# Patient Record
Sex: FEMALE | Race: WHITE | Employment: OTHER | URBAN - METROPOLITAN AREA
[De-identification: names, ages, dates, MRNs, and addresses within clinical notes are randomized per-mention and may not be internally consistent; named-entity substitution may affect disease eponyms.]

---

## 2019-06-11 ENCOUNTER — OFFICE VISIT (OUTPATIENT)
Dept: FAMILY MEDICINE | Facility: CLINIC | Age: 84
End: 2019-06-11
Payer: MEDICARE

## 2019-06-11 VITALS
HEART RATE: 66 BPM | SYSTOLIC BLOOD PRESSURE: 112 MMHG | BODY MASS INDEX: 23.6 KG/M2 | OXYGEN SATURATION: 95 % | DIASTOLIC BLOOD PRESSURE: 58 MMHG | WEIGHT: 125 LBS | RESPIRATION RATE: 16 BRPM | TEMPERATURE: 97.7 F | HEIGHT: 61 IN

## 2019-06-11 DIAGNOSIS — I10 ESSENTIAL HYPERTENSION, BENIGN: Primary | ICD-10-CM

## 2019-06-11 DIAGNOSIS — F43.22 ADJUSTMENT DISORDER WITH ANXIOUS MOOD: ICD-10-CM

## 2019-06-11 DIAGNOSIS — H91.93 BILATERAL HEARING LOSS, UNSPECIFIED HEARING LOSS TYPE: ICD-10-CM

## 2019-06-11 DIAGNOSIS — M19.90 OSTEOARTHRITIS, UNSPECIFIED OSTEOARTHRITIS TYPE, UNSPECIFIED SITE: ICD-10-CM

## 2019-06-11 DIAGNOSIS — M81.0 AGE RELATED OSTEOPOROSIS, UNSPECIFIED PATHOLOGICAL FRACTURE PRESENCE: ICD-10-CM

## 2019-06-11 DIAGNOSIS — Z86.79 HISTORY OF ENDOCARDITIS: ICD-10-CM

## 2019-06-11 DIAGNOSIS — Z86.73 HISTORY OF CVA (CEREBROVASCULAR ACCIDENT): ICD-10-CM

## 2019-06-11 LAB
ERYTHROCYTE [DISTWIDTH] IN BLOOD BY AUTOMATED COUNT: 12.1 % (ref 10–15)
HCT VFR BLD AUTO: 39.8 % (ref 35–47)
HGB BLD-MCNC: 13 G/DL (ref 11.7–15.7)
MCH RBC QN AUTO: 32.2 PG (ref 26.5–33)
MCHC RBC AUTO-ENTMCNC: 32.7 G/DL (ref 31.5–36.5)
MCV RBC AUTO: 99 FL (ref 78–100)
PLATELET # BLD AUTO: 142 10E9/L (ref 150–450)
RBC # BLD AUTO: 4.04 10E12/L (ref 3.8–5.2)
WBC # BLD AUTO: 5.4 10E9/L (ref 4–11)

## 2019-06-11 PROCEDURE — 99204 OFFICE O/P NEW MOD 45 MIN: CPT | Performed by: FAMILY MEDICINE

## 2019-06-11 PROCEDURE — 36415 COLL VENOUS BLD VENIPUNCTURE: CPT | Performed by: FAMILY MEDICINE

## 2019-06-11 PROCEDURE — 80048 BASIC METABOLIC PNL TOTAL CA: CPT | Performed by: FAMILY MEDICINE

## 2019-06-11 PROCEDURE — 85027 COMPLETE CBC AUTOMATED: CPT | Performed by: FAMILY MEDICINE

## 2019-06-11 RX ORDER — POTASSIUM CHLORIDE 1500 MG/1
TABLET, EXTENDED RELEASE ORAL
COMMUNITY
Start: 2019-02-06 | End: 2019-06-11

## 2019-06-11 RX ORDER — LATANOPROST 50 UG/ML
1 SOLUTION/ DROPS OPHTHALMIC AT BEDTIME
COMMUNITY

## 2019-06-11 RX ORDER — SERTRALINE HYDROCHLORIDE 25 MG/1
25 TABLET, FILM COATED ORAL DAILY
COMMUNITY
Start: 2019-06-11 | End: 2020-01-14

## 2019-06-11 RX ORDER — ACETAMINOPHEN 500 MG
500-1000 TABLET ORAL EVERY 6 HOURS PRN
Status: ON HOLD | COMMUNITY
Start: 2019-06-11 | End: 2024-04-12

## 2019-06-11 RX ORDER — ASPIRIN 325 MG
325 TABLET ORAL DAILY
COMMUNITY

## 2019-06-11 RX ORDER — POTASSIUM CHLORIDE 1500 MG/1
20 TABLET, EXTENDED RELEASE ORAL DAILY
Qty: 90 TABLET | Refills: 1 | Status: SHIPPED | OUTPATIENT
Start: 2019-06-11 | End: 2019-11-13

## 2019-06-11 RX ORDER — FUROSEMIDE 40 MG
40 TABLET ORAL DAILY
COMMUNITY
Start: 2018-07-24 | End: 2019-06-11

## 2019-06-11 RX ORDER — LISINOPRIL 40 MG/1
40 TABLET ORAL DAILY
Qty: 90 TABLET | Refills: 1 | Status: SHIPPED | OUTPATIENT
Start: 2019-06-11 | End: 2019-10-21

## 2019-06-11 RX ORDER — FUROSEMIDE 40 MG
40 TABLET ORAL DAILY
Qty: 90 TABLET | Refills: 1 | Status: SHIPPED | OUTPATIENT
Start: 2019-06-11 | End: 2019-10-21

## 2019-06-11 RX ORDER — METOPROLOL TARTRATE 100 MG
100 TABLET ORAL 2 TIMES DAILY
COMMUNITY
Start: 2019-04-04 | End: 2019-06-11

## 2019-06-11 RX ORDER — LISINOPRIL 40 MG/1
40 TABLET ORAL
COMMUNITY
Start: 2018-10-14 | End: 2019-06-11

## 2019-06-11 RX ORDER — METOPROLOL TARTRATE 100 MG
100 TABLET ORAL 2 TIMES DAILY
Qty: 180 TABLET | Refills: 1 | Status: SHIPPED | OUTPATIENT
Start: 2019-06-11 | End: 2019-11-07

## 2019-06-11 ASSESSMENT — MIFFLIN-ST. JEOR: SCORE: 908.34

## 2019-06-11 NOTE — PROGRESS NOTES
Subjective     Dayana Samano is a 89 year old female who presents to clinic today for the following health issues:    HPI   Here to establish care.  Here with family member. Reviewed some records in Care Everywhere. Reviewed histories with them.    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     History of endocarditis      Osteoporosis, unspecified      Personal history of colonic polyps     last colonoscopy 2000     Past Surgical History:   Procedure Laterality Date     FEMUR SURGERY Right 05/16/2017    nail; right intertrochanteric fracture       Current Outpatient Medications   Medication Sig Dispense Refill     acetaminophen (TYLENOL) 500 MG tablet Take 1-2 tablets (500-1,000 mg) by mouth every 6 hours as needed for mild pain       aspirin (ASA) 325 MG tablet Take 325 mg by mouth daily       CALCIUM 600 + D 600-200 MG-UNIT OR TABS  3 MONTHS 1 YEAR     furosemide (LASIX) 40 MG tablet Take 1 tablet (40 mg) by mouth daily 90 tablet 1     hypromellose (ARTIFICIAL TEARS) 0.5 % SOLN ophthalmic solution 1 drop every hour as needed for dry eyes       latanoprost (XALATAN) 0.005 % ophthalmic solution Place 1 drop into both eyes daily       lisinopril (PRINIVIL/ZESTRIL) 40 MG tablet Take 1 tablet (40 mg) by mouth daily 90 tablet 1     metoprolol tartrate (LOPRESSOR) 100 MG tablet Take 1 tablet (100 mg) by mouth 2 times daily 180 tablet 1     potassium chloride ER (KLOR-CON) 20 MEQ CR tablet Take 1 tablet (20 mEq) by mouth daily 90 tablet 1     sertraline (ZOLOFT) 25 MG tablet Take 1 tablet (25 mg) by mouth daily         Reviewed and updated as needed this visit by Provider         Review of Systems   ROS  "COMP: CONSTITUTIONAL:NEGATIVE for fever, chills, change in weight  RESP:NEGATIVE for significant cough or SOB  CV: NEGATIVE for chest pain, palpitations or peripheral edema  MUSCULOSKELETAL: see below  PSYCHIATRIC: NEGATIVE for changes in mood or affect    AL in [place].  Lives mostly independently. Will get one meal per day while there. Help with bath one day per week.    Will get pain in left elbow at times.    Has had hospitalizations related to fractures; elbow, pelvis.  Has been off actonel for some time.   Age 89 is when things started.   Fractured hip May 2017  Taking 1200 mg calcium daily.    Itchy spots.   Did see Dermatologist. May have some inside mouth.     Does have heart murmur; history of endocadtitis.   This was complicated with stroke. Affected right hand; stll with residual.     Bilateral aids.    Was put on sertraline when selling house and making decisions.   Couple years ago.  Now she is stable where she is; not sure if needs it. Has not been on medications previously.            Objective    /58 (BP Location: Right arm, Cuff Size: Adult Regular)   Pulse 66   Temp 97.7  F (36.5  C) (Oral)   Resp 16   Ht 1.556 m (5' 1.25\")   Wt 56.7 kg (125 lb)   SpO2 95%   BMI 23.43 kg/m    Body mass index is 23.43 kg/m .  Physical Exam   GENERAL APPEARANCE: alert and no distress  RESP: lungs clear to auscultation - no rales, rhonchi or wheezes  CV: regular rates and rhythm  MS: no edema.   PSYCH: mentation appears normal and affect normal/bright          GFR 7/24/18 50 from Care Everywhere        Assessment & Plan     1. Essential hypertension, benign  Stable. Refilling.   - Basic metabolic panel  - CBC with platelets  - metoprolol tartrate (LOPRESSOR) 100 MG tablet; Take 1 tablet (100 mg) by mouth 2 times daily  Dispense: 180 tablet; Refill: 1  - lisinopril (PRINIVIL/ZESTRIL) 40 MG tablet; Take 1 tablet (40 mg) by mouth daily  Dispense: 90 tablet; Refill: 1    2. Age related osteoporosis, " unspecified pathological fracture presence  Not currently on treatment. Discussed some of this briefly. She is taking calcium. She is at risk for future fracture.  May wish to consider medication; I believe she has been on some in the past. (alendronate; did see in her [facility] records that she declined additional treatment in 2016)    3. Osteoarthritis, unspecified osteoarthritis type, unspecified site  Discussed acetaminophen dosing. Discussed this as being the safest oral medication in my opinion. Can try some topicals as well.   - acetaminophen (TYLENOL) 500 MG tablet; Take 1-2 tablets (500-1,000 mg) by mouth every 6 hours as needed for mild pain    4. History of endocarditis  Getting prescriptions in my name.  - furosemide (LASIX) 40 MG tablet; Take 1 tablet (40 mg) by mouth daily  Dispense: 90 tablet; Refill: 1  - metoprolol tartrate (LOPRESSOR) 100 MG tablet; Take 1 tablet (100 mg) by mouth 2 times daily  Dispense: 180 tablet; Refill: 1  - lisinopril (PRINIVIL/ZESTRIL) 40 MG tablet; Take 1 tablet (40 mg) by mouth daily  Dispense: 90 tablet; Refill: 1  - potassium chloride ER (KLOR-CON) 20 MEQ CR tablet; Take 1 tablet (20 mEq) by mouth daily  Dispense: 90 tablet; Refill: 1    5. History of CVA (cerebrovascular accident)  With some right hand residual. Stable overall.  Thought due to emboli related to her endocarditis.     6. Adjustment disorder with anxious mood  Was put on sertraline when selling house etc. At this time, will do trial of going off. Also see patient instructions.    7. Bilateral hearing loss, unspecified hearing loss type  Wears hearing aids.              Patient Instructions       I would recommend to try going off the sertraline.    Go with 1/2 pill for a couple weeks. If doing well, you can try to stop it.          No follow-ups on file.     I spent 45 minutes face to face, of which at least 50 % was spent in counseling or coordination of care for reviewing history and medications.  .    N4  Marion Alonso MD, MD  [facility]

## 2019-06-11 NOTE — LETTER
June 19, 2019      Dayana JAVIER Samano  [address] [place] [id number]        Dear Ms. Dayana Samano,    Enclosed is a copy of your recent results.    GFR stands for glomerular filtration rate (this is in regards to the kidney). They are now showing an estimate of this, based on the actual creatinine, age, gender, and race. This is commonly lower as one gets older. Your level of hydration can have an impact also.  If this is low, we should be aggressive with managing high blood pressure or high cholesterol.    The last value I found was 50 last July.  I do recommend that we repeat this, making sure you are well hydrated. I am asking someone to call.    It was nice meeting you recently!    Sincerely,     Marion Alonso MD

## 2019-06-11 NOTE — PATIENT INSTRUCTIONS
I would recommend to try going off the sertraline.    Go with 1/2 pill for a couple weeks. If doing well, you can try to stop it.

## 2019-06-12 LAB
ANION GAP SERPL CALCULATED.3IONS-SCNC: 6 MMOL/L (ref 3–14)
BUN SERPL-MCNC: 37 MG/DL (ref 7–30)
CALCIUM SERPL-MCNC: 10.2 MG/DL (ref 8.5–10.1)
CHLORIDE SERPL-SCNC: 103 MMOL/L (ref 94–109)
CO2 SERPL-SCNC: 30 MMOL/L (ref 20–32)
CREAT SERPL-MCNC: 1.18 MG/DL (ref 0.52–1.04)
GFR SERPL CREATININE-BSD FRML MDRD: 39 ML/MIN/{1.73_M2}
GLUCOSE SERPL-MCNC: 97 MG/DL (ref 70–99)
POTASSIUM SERPL-SCNC: 4.6 MMOL/L (ref 3.4–5.3)
SODIUM SERPL-SCNC: 139 MMOL/L (ref 133–144)

## 2019-06-18 ENCOUNTER — TELEPHONE (OUTPATIENT)
Dept: FAMILY MEDICINE | Facility: CLINIC | Age: 84
End: 2019-06-18

## 2019-06-18 DIAGNOSIS — R94.4 DECREASED GFR: Primary | ICD-10-CM

## 2019-06-18 PROBLEM — F43.22 ADJUSTMENT DISORDER WITH ANXIOUS MOOD: Status: ACTIVE | Noted: 2019-06-18

## 2019-06-18 NOTE — TELEPHONE ENCOUNTER
Please call her...     Her GFR is low (see my result note).  I would like to repeat this, making sure she is well hydrated prior to coming in.    Please see if you can help her schedule...  Thanks!

## 2019-06-19 DIAGNOSIS — R94.4 DECREASED GFR: ICD-10-CM

## 2019-06-19 PROCEDURE — 36415 COLL VENOUS BLD VENIPUNCTURE: CPT | Performed by: FAMILY MEDICINE

## 2019-06-19 PROCEDURE — 80048 BASIC METABOLIC PNL TOTAL CA: CPT | Performed by: FAMILY MEDICINE

## 2019-06-19 NOTE — TELEPHONE ENCOUNTER
Notified pt of results and recommendations. Pt verbalized understanding. Pt would like her daughter call and schedule her lab appt because she has to take her. Adv order was in and to be well HYDRATED when she comes for appt.     She is also reported she is doing well on the decrease dosage of Sertraline.     Megan PEREZ RN

## 2019-06-20 LAB
ANION GAP SERPL CALCULATED.3IONS-SCNC: 7 MMOL/L (ref 3–14)
BUN SERPL-MCNC: 34 MG/DL (ref 7–30)
CALCIUM SERPL-MCNC: 10.1 MG/DL (ref 8.5–10.1)
CHLORIDE SERPL-SCNC: 103 MMOL/L (ref 94–109)
CO2 SERPL-SCNC: 32 MMOL/L (ref 20–32)
CREAT SERPL-MCNC: 1.11 MG/DL (ref 0.52–1.04)
GFR SERPL CREATININE-BSD FRML MDRD: 42 ML/MIN/{1.73_M2}
GLUCOSE SERPL-MCNC: 102 MG/DL (ref 70–99)
POTASSIUM SERPL-SCNC: 4.4 MMOL/L (ref 3.4–5.3)
SODIUM SERPL-SCNC: 142 MMOL/L (ref 133–144)

## 2019-07-24 ENCOUNTER — TELEPHONE (OUTPATIENT)
Dept: FAMILY MEDICINE | Facility: CLINIC | Age: 84
End: 2019-07-24

## 2019-09-27 ENCOUNTER — TELEPHONE (OUTPATIENT)
Dept: FAMILY MEDICINE | Facility: CLINIC | Age: 84
End: 2019-09-27

## 2019-09-27 NOTE — TELEPHONE ENCOUNTER
Daughter, Marion calling in- pt has wet cough, persistent, sounds like it is in throat/upper chest, no fevers, not productive.    She is wanting some kind of cough medicine. With ages and meds will huddle and call daughter back.     Adv to try humidifier, nasal spray     Marion [phone]      Dr. Alonso is ok w/ Robitussin DM or Delsym. Adv would need to be seen if high fever, SOB/wheezing/difficulty breathing, prolonged cough. Marion verbalized understanding.     Megan PEREZ RN

## 2019-10-11 ENCOUNTER — TELEPHONE (OUTPATIENT)
Dept: FAMILY MEDICINE | Facility: CLINIC | Age: 84
End: 2019-10-11

## 2019-10-11 NOTE — TELEPHONE ENCOUNTER
Reason for call:  Other   Patient called regarding (reason for call): Cough drop  Additional comments: Patient would like to discuss cough drops/cough medicine that Dr. Alonso recommended on 9/27/2019.    Phone number to reach patient:  Home number on file [phone] (home)    Best Time:  any    Can we leave a detailed message on this number?  YES

## 2019-10-11 NOTE — TELEPHONE ENCOUNTER
Patient had been taking Delsym and is effective. Still has episodes of non productive cough. Patient states thinks cough better, but then has coughing episodes.    Denies dyspnea, fever, and any concerning symptoms.    Advised could start taking Delsym again and monitor symptoms. If continues should be seen in coming week.    If symptoms worse should be seen sooner. Did offer appt today and patient would like to monitor symptoms at this time.    Carmella Wang RN

## 2019-10-21 DIAGNOSIS — I10 ESSENTIAL HYPERTENSION, BENIGN: ICD-10-CM

## 2019-10-21 DIAGNOSIS — Z86.79 HISTORY OF ENDOCARDITIS: ICD-10-CM

## 2019-10-21 RX ORDER — FUROSEMIDE 40 MG
40 TABLET ORAL DAILY
Qty: 90 TABLET | Refills: 1 | Status: SHIPPED | OUTPATIENT
Start: 2019-10-21 | End: 2020-03-25

## 2019-10-21 RX ORDER — LISINOPRIL 40 MG/1
40 TABLET ORAL DAILY
Qty: 90 TABLET | Refills: 0 | Status: SHIPPED | OUTPATIENT
Start: 2019-10-21 | End: 2020-01-14

## 2019-10-21 RX ORDER — SERTRALINE HYDROCHLORIDE 25 MG/1
25 TABLET, FILM COATED ORAL DAILY
Qty: 90 TABLET | Refills: 0 | Status: CANCELLED | OUTPATIENT
Start: 2019-10-21

## 2019-10-21 NOTE — TELEPHONE ENCOUNTER
Pending Prescriptions:                       Disp   Refills    lisinopril (PRINIVIL/ZESTRIL) 40 MG [id number] tab*1            Sig: Take 1 tablet (40 mg) by mouth daily    sertraline (ZOLOFT) 25 MG tablet                              Sig: Take 1 tablet (25 mg) by mouth daily    furosemide (LASIX) 40 MG tablet           90 tab*1            Sig: Take 1 tablet (40 mg) by mouth daily    Reason for call:  Other   Patient called regarding (reason for call): prescription  Additional comments: Margy from [facility] called for 90 day supply of these three medications. Please contact Margy or the pt to advise. Margy can be reached at [phone].    Phone number to reach patient:  Home number on file [phone] (home)    Best Time:  Any    Can we leave a detailed message on this number?  Not Applicable

## 2019-10-21 NOTE — TELEPHONE ENCOUNTER
Routing refill request to provider for review/approval because:  Medication is reported/historical    This is new pharmacy for pt.  Alexa Perez BSN, RN

## 2019-10-21 NOTE — TELEPHONE ENCOUNTER
She was going to trial going off this medication.     Would want an update if this did not work.   Recommend an appointment; could be virtual.    Also, there is someone else listed as PCP.   It is a different name than who she was seeing previously (in Care Everywhere).   Is she still coming here? And if so, please put me in as PCP.    Thanks!

## 2019-10-22 NOTE — TELEPHONE ENCOUNTER
Called the pt to discuss.    She thinks she stopped the sertraline.  She is not requesting it.      She says Dr. Alonso is her primary doctor.  Updated pcp.

## 2019-10-25 RX ORDER — SERTRALINE HYDROCHLORIDE 25 MG/1
25 TABLET, FILM COATED ORAL DAILY
Status: CANCELLED | OUTPATIENT
Start: 2019-10-25

## 2019-10-25 NOTE — TELEPHONE ENCOUNTER
"Requested Prescriptions   Pending Prescriptions Disp Refills     sertraline (ZOLOFT) 25 MG tablet  Last Written Prescription Date:  06/11/2019  Last Fill Quantity: na,  # refills: na   Last Office Visit: 6/11/2019    Marion Alonso MD        Future Office Visit:    Next 5 appointments (look out 90 days)    Jan 14, 2020  1:30 PM CST  Office Visit with Marion Alonso MD  [address] [address] [phone]                Sig: Take 1 tablet (25 mg) by mouth daily       SSRIs Protocol Passed - 10/25/2019  2:13 PM        Passed - Recent (12 mo) or future (30 days) visit within the authorizing provider's specialty     Patient has had an office visit with the authorizing provider or a provider within the authorizing providers department within the previous 12 mos or has a future within next 30 days. See \"Patient Info\" tab in inbasket, or \"Choose Columns\" in Meds & Orders section of the refill encounter.              Passed - Medication is active on med list        Passed - Patient is age 18 or older        Passed - No active pregnancy on record        Passed - No positive pregnancy test in last 12 months     Routing refill request to provider for review/approval because:  Medication is reported/historical         "

## 2019-10-28 NOTE — TELEPHONE ENCOUNTER
Routing refill request to provider for review/approval because:  This is on the medication list as a historical.  Does not meet protocol.  Thank you. Surekha Trejo R.N.

## 2019-10-29 NOTE — TELEPHONE ENCOUNTER
Called the pt to discuss.      She says she does not take it any more.  It was the little green one.

## 2019-11-04 ENCOUNTER — HEALTH MAINTENANCE LETTER (OUTPATIENT)
Age: 84
End: 2019-11-04

## 2019-11-07 DIAGNOSIS — Z86.79 HISTORY OF ENDOCARDITIS: ICD-10-CM

## 2019-11-07 DIAGNOSIS — I10 ESSENTIAL HYPERTENSION, BENIGN: ICD-10-CM

## 2019-11-07 RX ORDER — METOPROLOL TARTRATE 100 MG
100 TABLET ORAL 2 TIMES DAILY
Qty: 180 TABLET | Refills: 1 | Status: SHIPPED | OUTPATIENT
Start: 2019-11-07 | End: 2020-01-14

## 2019-11-07 NOTE — TELEPHONE ENCOUNTER
"Requested Prescriptions   Pending Prescriptions Disp Refills     metoprolol tartrate (LOPRESSOR) 100 MG tablet 180 tablet 1     Sig: Take 1 tablet (100 mg) by mouth 2 times daily   Last Written Prescription Date:  6/11/19  Last Fill Quantity: 180,  # refills: 1   Last office visit: 6/11/2019 with prescribing provider:  Marion Alonso MD   Future Office Visit:   Next 5 appointments (look out 90 days)    Jan 14, 2020  1:30 PM CST  Office Visit with Marion Alonso MD  [address] [address]  [phone]             Beta-Blockers Protocol Passed - 11/7/2019 10:16 AM        Passed - Blood pressure under 140/90 in past 12 months     BP Readings from Last 3 Encounters:   06/11/19 112/58   09/25/07 116/62   01/02/07 124/60                 Passed - Patient is age 6 or older        Passed - Recent (12 mo) or future (30 days) visit within the authorizing provider's specialty     Patient has had an office visit with the authorizing provider or a provider within the authorizing providers department within the previous 12 mos or has a future within next 30 days. See \"Patient Info\" tab in inbasket, or \"Choose Columns\" in Meds & Orders section of the refill encounter.              Passed - Medication is active on med list         "

## 2019-11-13 DIAGNOSIS — Z86.79 HISTORY OF ENDOCARDITIS: ICD-10-CM

## 2019-11-13 RX ORDER — POTASSIUM CHLORIDE 1500 MG/1
20 TABLET, EXTENDED RELEASE ORAL DAILY
Qty: 90 TABLET | Refills: 0 | Status: SHIPPED | OUTPATIENT
Start: 2019-11-13 | End: 2020-01-14

## 2019-11-13 NOTE — TELEPHONE ENCOUNTER
Prescription approved per [facility] Refill Protocol.  Yoselyn Ann RN

## 2019-11-13 NOTE — TELEPHONE ENCOUNTER
"Requested Prescriptions   Pending Prescriptions Disp Refills     potassium chloride ER (KLOR-CON) 20 MEQ CR tablet    Last Written Prescription Date:  6/11/2019  Last Fill Quantity: 90,  # refills: 1   Last office visit: 6/11/2019 with prescribing provider:  Marion Alonso     Future Office Visit:   Next 5 appointments (look out 90 days)    Jan 14, 2020  1:30 PM CST  Office Visit with Marion Alonso MD  [address] [address] [phone]          90 tablet 1     Sig: Take 1 tablet (20 mEq) by mouth daily       Potassium Supplements Protocol Passed - 11/13/2019 11:50 AM        Passed - Recent (12 mo) or future (30 days) visit within the authorizing provider's department     Patient has had an office visit with the authorizing provider or a provider within the authorizing providers department within the previous 12 mos or has a future within next 30 days. See \"Patient Info\" tab in inbasket, or \"Choose Columns\" in Meds & Orders section of the refill encounter.              Passed - Medication is active on med list        Passed - Patient is age 18 or older        Passed - Normal serum potassium in past 12 months     Recent Labs   Lab Test 06/19/19  1416   POTASSIUM 4.4                      "

## 2019-12-11 ENCOUNTER — TRANSFERRED RECORDS (OUTPATIENT)
Dept: HEALTH INFORMATION MANAGEMENT | Facility: CLINIC | Age: 84
End: 2019-12-11

## 2020-01-10 NOTE — PROGRESS NOTES
Subjective     Dayana Samano is a 89 year old female who presents to clinic today for the following health issues:    HPI   Hypertension Follow-up      Do you check your blood pressure regularly outside of the clinic? Yes     Are you following a low salt diet? No    Are your blood pressures ever more than 140 on the top number (systolic) OR more   than 90 on the bottom number (diastolic), for example 140/90? No    Anxiety Follow-Up    How are you doing with your anxiety since your last visit? No change    Are you having other symptoms that might be associated with anxiety? No    Have you had a significant life event? No     Are you feeling depressed? No    Do you have any concerns with your use of alcohol or other drugs? No    Social History     Tobacco Use     Smoking status: Former Smoker     Last attempt to quit: 1990     Years since quittin.0     Smokeless tobacco: Never Used   Substance Use Topics     Alcohol use: Yes     Comment: glass of wine with dinner-sometimes     Drug use: No     No flowsheet data found.  No flowsheet data found.        How many servings of fruits and vegetables do you eat daily?  0-1    On average, how many sweetened beverages do you drink each day (Examples: soda, juice, sweet tea, etc.  Do NOT count diet or artificially sweetened beverages)?   1  How many days per week do you miss taking your medication? 1    What makes it hard for you to take your medications?  remembering to take        See under ROS     Patient Active Problem List   Diagnosis     Essential hypertension, benign     Osteoporosis     Esophageal reflux     History of CVA (cerebrovascular accident)     History of endocarditis     Hearing loss     Adjustment disorder with anxious mood       Current Outpatient Medications   Medication Sig Dispense Refill     acetaminophen (TYLENOL) 500 MG tablet Take 1-2 tablets (500-1,000 mg) by mouth every 6 hours as needed for mild pain       aspirin (ASA) 325 MG tablet Take  "325 mg by mouth daily       CALCIUM 600 + D 600-200 MG-UNIT OR TABS  3 MONTHS 1 YEAR     furosemide (LASIX) 40 MG tablet Take 1 tablet (40 mg) by mouth daily 90 tablet 1     hypromellose (ARTIFICIAL TEARS) 0.5 % SOLN ophthalmic solution 1 drop every hour as needed for dry eyes       latanoprost (XALATAN) 0.005 % ophthalmic solution Place 1 drop into both eyes daily       lisinopril (PRINIVIL/ZESTRIL) 40 MG tablet Take 1 tablet (40 mg) by mouth daily 90 tablet 0     metoprolol tartrate (LOPRESSOR) 100 MG tablet Take 1 tablet (100 mg) by mouth 2 times daily 180 tablet 1     potassium chloride ER (KLOR-CON) 20 MEQ CR tablet Take 1 tablet (20 mEq) by mouth daily 90 tablet 0           Reviewed and updated as needed this visit by Provider         Review of Systems   ROS COMP: CONSTITUTIONAL:NEGATIVE for fever, chills, change in weight  CV: NEGATIVE for chest pain, palpitations or peripheral edema  PSYCHIATRIC: NEGATIVE for changes in mood or affect    Here with daughter.   She notes itchy spots on forehead.       Did get second shingrix.    [facility]    Objective    /62 (BP Location: Right arm, Cuff Size: Adult Regular)   Pulse 64   Temp 97.8  F (36.6  C) (Oral)   Resp 16   Ht 1.556 m (5' 1.25\")   Wt 57.8 kg (127 lb 6.4 oz)   SpO2 95%   BMI 23.88 kg/m    Body mass index is 23.88 kg/m .  Physical Exam   GENERAL APPEARANCE: alert and no distress  CV: regular rates and rhythm  MS: no edema.   SKIN: some red spots on forehead.  PSYCH: mentation appears normal and affect normal/bright    PHQ-9 SCORE 1/14/2020   PHQ-9 Total Score 7     ABDI-7 SCORE 1/14/2020   Total Score 0           GFR Estimate   Date Value Ref Range Status   06/19/2019 42 (L) >60 mL/min/[1.73_m2] Final     Comment:     Non  GFR Calc  Starting 12/18/2018, serum creatinine based estimated GFR (eGFR) will be   calculated using the Chronic Kidney Disease Epidemiology Collaboration   (CKD-EPI) equation.     06/11/2019 39 (L) >60 " mL/min/[1.73_m2] Final     Comment:     Non  GFR Calc  Starting 12/18/2018, serum creatinine based estimated GFR (eGFR) will be   calculated using the Chronic Kidney Disease Epidemiology Collaboration   (CKD-EPI) equation.     01/02/2007 73 >60 mL/min/1.7m2 Final             Assessment & Plan     1. Essential hypertension, benign  Well controlled. No change in medication.  - lisinopril (PRINIVIL/ZESTRIL) 40 MG tablet; Take 1 tablet (40 mg) by mouth daily  Dispense: 90 tablet; Refill: 1  - metoprolol tartrate (LOPRESSOR) 100 MG tablet; Take 1 tablet (100 mg) by mouth 2 times daily  Dispense: 180 tablet; Refill: 1  - Renal panel (Alb, BUN, Ca, Cl, CO2, Creat, Gluc, Phos, K, Na); Future    2. CKD (chronic kidney disease) stage 3, GFR 30-59 ml/min (H)  Continue to monitor.   Discussed avoiding NSAID.   She is on ACEi.   Discussed hydration.  - Basic metabolic panel  - Albumin Random Urine Quantitative with Creat Ratio  - Renal panel (Alb, BUN, Ca, Cl, CO2, Creat, Gluc, Phos, K, Na); Future    3. History of endocarditis    - lisinopril (PRINIVIL/ZESTRIL) 40 MG tablet; Take 1 tablet (40 mg) by mouth daily  Dispense: 90 tablet; Refill: 1  - potassium chloride ER (KLOR-CON) 20 MEQ CR tablet; Take 1 tablet (20 mEq) by mouth daily  Dispense: 90 tablet; Refill: 1  - metoprolol tartrate (LOPRESSOR) 100 MG tablet; Take 1 tablet (100 mg) by mouth 2 times daily  Dispense: 180 tablet; Refill: 1    4. History of skin cancer  She does have a Dermatologist. I do wonder about the current lesions on her forehead; suspect AK. Recommend she see her Dermatologist.         Patient Instructions       Keep well hydrated. Keep your urine a light yellow.    Avoid medications like ibuprofen, advil, aleve.  Tylenol (acetaminophen) is OK.           Return in about 6 months (around 7/14/2020).    Marion Alonso MD, MD  [facility]

## 2020-01-13 DIAGNOSIS — Z86.79 HISTORY OF ENDOCARDITIS: ICD-10-CM

## 2020-01-13 DIAGNOSIS — I10 ESSENTIAL HYPERTENSION, BENIGN: ICD-10-CM

## 2020-01-13 NOTE — TELEPHONE ENCOUNTER
Routing refill request to provider for review/approval because:  Labs out of range:  Creat off - pt has an appt scheduled for tomorrow - 1/14/20 - called the pt to see if she needs the lisinopril before tomorrow, she said she didn't realize she had an appt and will have to speak to her dtr who is out of town.  She will call to cancel if she will not be able to make it.

## 2020-01-13 NOTE — TELEPHONE ENCOUNTER
"Requested Prescriptions   Pending Prescriptions Disp Refills     lisinopril (PRINIVIL/ZESTRIL) 40 MG tablet [Pharmacy Med Name: LISINOPRIL TABS 40MG] 90 tablet 4     Sig: TAKE 1 TABLET DAILY   Last Written Prescription Date:  10/21/19  Last Fill Quantity: 90,  # refills: 0   Last office visit: 6/11/2019 with prescribing provider:  Marion Alonso MD   Future Office Visit:   Next 5 appointments (look out 90 days)    Jan 14, 2020  1:30 PM CST  [facility] [address] [facility] [address] [phone]             ACE Inhibitors (Including Combos) Protocol Failed - 1/13/2020 11:43 AM        Failed - Normal serum creatinine on file in past 12 months     Recent Labs   Lab Test 06/19/19  1416   CR 1.11*             Passed - Blood pressure under 140/90 in past 12 months     BP Readings from Last 3 Encounters:   06/11/19 112/58   09/25/07 116/62   01/02/07 124/60                 Passed - Recent (12 mo) or future (30 days) visit within the authorizing provider's specialty     Patient has had an office visit with the authorizing provider or a provider within the authorizing providers department within the previous 12 mos or has a future within next 30 days. See \"Patient Info\" tab in inbasket, or \"Choose Columns\" in Meds & Orders section of the refill encounter.              Passed - Medication is active on med list        Passed - Patient is age 18 or older        Passed - No active pregnancy on record        Passed - Normal serum potassium on file in past 12 months     Recent Labs   Lab Test 06/19/19  1416   POTASSIUM 4.4             Passed - No positive pregnancy test within past 12 months         "

## 2020-01-14 ENCOUNTER — OFFICE VISIT (OUTPATIENT)
Dept: FAMILY MEDICINE | Facility: CLINIC | Age: 85
End: 2020-01-14
Payer: MEDICARE

## 2020-01-14 VITALS
TEMPERATURE: 97.8 F | RESPIRATION RATE: 16 BRPM | HEART RATE: 64 BPM | SYSTOLIC BLOOD PRESSURE: 110 MMHG | DIASTOLIC BLOOD PRESSURE: 62 MMHG | HEIGHT: 61 IN | OXYGEN SATURATION: 95 % | WEIGHT: 127.4 LBS | BODY MASS INDEX: 24.05 KG/M2

## 2020-01-14 DIAGNOSIS — N18.30 CKD (CHRONIC KIDNEY DISEASE) STAGE 3, GFR 30-59 ML/MIN (H): ICD-10-CM

## 2020-01-14 DIAGNOSIS — Z85.828 HISTORY OF SKIN CANCER: ICD-10-CM

## 2020-01-14 DIAGNOSIS — E83.52 HYPERCALCEMIA: ICD-10-CM

## 2020-01-14 DIAGNOSIS — Z86.79 HISTORY OF ENDOCARDITIS: ICD-10-CM

## 2020-01-14 DIAGNOSIS — I10 ESSENTIAL HYPERTENSION, BENIGN: Primary | ICD-10-CM

## 2020-01-14 PROCEDURE — 99214 OFFICE O/P EST MOD 30 MIN: CPT | Performed by: FAMILY MEDICINE

## 2020-01-14 PROCEDURE — 82043 UR ALBUMIN QUANTITATIVE: CPT | Performed by: FAMILY MEDICINE

## 2020-01-14 PROCEDURE — 80048 BASIC METABOLIC PNL TOTAL CA: CPT | Performed by: FAMILY MEDICINE

## 2020-01-14 PROCEDURE — 36415 COLL VENOUS BLD VENIPUNCTURE: CPT | Performed by: FAMILY MEDICINE

## 2020-01-14 RX ORDER — METOPROLOL TARTRATE 100 MG
100 TABLET ORAL 2 TIMES DAILY
Qty: 180 TABLET | Refills: 1 | Status: SHIPPED | OUTPATIENT
Start: 2020-01-14 | End: 2020-09-16

## 2020-01-14 RX ORDER — POTASSIUM CHLORIDE 1500 MG/1
20 TABLET, EXTENDED RELEASE ORAL DAILY
Qty: 90 TABLET | Refills: 1 | Status: SHIPPED | OUTPATIENT
Start: 2020-01-14 | End: 2020-07-02

## 2020-01-14 RX ORDER — LISINOPRIL 40 MG/1
40 TABLET ORAL DAILY
Qty: 90 TABLET | Refills: 1 | Status: SHIPPED | OUTPATIENT
Start: 2020-01-14 | End: 2020-06-30

## 2020-01-14 ASSESSMENT — ANXIETY QUESTIONNAIRES
6. BECOMING EASILY ANNOYED OR IRRITABLE: NOT AT ALL
GAD7 TOTAL SCORE: 0
7. FEELING AFRAID AS IF SOMETHING AWFUL MIGHT HAPPEN: NOT AT ALL
1. FEELING NERVOUS, ANXIOUS, OR ON EDGE: NOT AT ALL
2. NOT BEING ABLE TO STOP OR CONTROL WORRYING: NOT AT ALL
3. WORRYING TOO MUCH ABOUT DIFFERENT THINGS: NOT AT ALL
5. BEING SO RESTLESS THAT IT IS HARD TO SIT STILL: NOT AT ALL

## 2020-01-14 ASSESSMENT — PATIENT HEALTH QUESTIONNAIRE - PHQ9
SUM OF ALL RESPONSES TO PHQ QUESTIONS 1-9: 7
5. POOR APPETITE OR OVEREATING: NOT AT ALL

## 2020-01-14 ASSESSMENT — MIFFLIN-ST. JEOR: SCORE: 914.22

## 2020-01-14 NOTE — TELEPHONE ENCOUNTER
GFR Estimate   Date Value Ref Range Status   06/19/2019 42 (L) >60 mL/min/[1.73_m2] Final     Comment:     Non  GFR Calc  Starting 12/18/2018, serum creatinine based estimated GFR (eGFR) will be   calculated using the Chronic Kidney Disease Epidemiology Collaboration   (CKD-EPI) equation.     06/11/2019 39 (L) >60 mL/min/[1.73_m2] Final     Comment:     Non  GFR Calc  Starting 12/18/2018, serum creatinine based estimated GFR (eGFR) will be   calculated using the Chronic Kidney Disease Epidemiology Collaboration   (CKD-EPI) equation.     01/02/2007 73 >60 mL/min/1.7m2 Final

## 2020-01-14 NOTE — PATIENT INSTRUCTIONS
Keep well hydrated. Keep your urine a light yellow.    Avoid medications like ibuprofen, advil, aleve.  Tylenol (acetaminophen) is OK.

## 2020-01-15 LAB
ANION GAP SERPL CALCULATED.3IONS-SCNC: 4 MMOL/L (ref 3–14)
BUN SERPL-MCNC: 31 MG/DL (ref 7–30)
CALCIUM SERPL-MCNC: 10.5 MG/DL (ref 8.5–10.1)
CHLORIDE SERPL-SCNC: 104 MMOL/L (ref 94–109)
CO2 SERPL-SCNC: 32 MMOL/L (ref 20–32)
CREAT SERPL-MCNC: 1.13 MG/DL (ref 0.52–1.04)
CREAT UR-MCNC: 33 MG/DL
GFR SERPL CREATININE-BSD FRML MDRD: 41 ML/MIN/{1.73_M2}
GLUCOSE SERPL-MCNC: 98 MG/DL (ref 70–99)
MICROALBUMIN UR-MCNC: <5 MG/L
MICROALBUMIN/CREAT UR: NORMAL MG/G CR (ref 0–25)
POTASSIUM SERPL-SCNC: 4.7 MMOL/L (ref 3.4–5.3)
SODIUM SERPL-SCNC: 140 MMOL/L (ref 133–144)

## 2020-01-15 RX ORDER — LISINOPRIL 40 MG/1
TABLET ORAL
Qty: 90 TABLET | OUTPATIENT
Start: 2020-01-15

## 2020-01-15 ASSESSMENT — ANXIETY QUESTIONNAIRES: GAD7 TOTAL SCORE: 0

## 2020-01-19 PROBLEM — Z85.828 HISTORY OF SKIN CANCER: Status: ACTIVE | Noted: 2020-01-19

## 2020-02-16 ENCOUNTER — HEALTH MAINTENANCE LETTER (OUTPATIENT)
Age: 85
End: 2020-02-16

## 2020-03-25 DIAGNOSIS — Z86.79 HISTORY OF ENDOCARDITIS: ICD-10-CM

## 2020-03-25 RX ORDER — FUROSEMIDE 40 MG
TABLET ORAL
Qty: 90 TABLET | Refills: 3 | Status: SHIPPED | OUTPATIENT
Start: 2020-03-25 | End: 2021-03-22

## 2020-05-07 ENCOUNTER — NURSE TRIAGE (OUTPATIENT)
Dept: FAMILY MEDICINE | Facility: CLINIC | Age: 85
End: 2020-05-07

## 2020-05-07 NOTE — TELEPHONE ENCOUNTER
Daughter Marion called regarding patient symptoms. Called patient to get more information. Has had abdominal pain for about 4 days off and on. Patient felt was constipated but then has had loose stools which patient states is not unusual for her. The cramping is off and on and moves around, becomes worse with bending over, gets some relief with tylenol. Temp 99.8. Some nausea but denies vomiting. No blood in stools. Hx of diverticulitis 40 years ago. Lack of appetite which is baseline and able to drink, urinating normally. Offered patient in clinic appointment but patient declined at this time due to having to be in quarantine for 14 days if leave care facility per their guidelines. Advised to call back if symptoms worsen. Called patient daughter to update.     Rio HUBER RN, BSN     Additional Information    Negative: Passed out (i.e., fainted, collapsed and was not responding)    Negative: Shock suspected (e.g., cold/pale/clammy skin, too weak to stand, low BP, rapid pulse)    Negative: Sounds like a life-threatening emergency to the triager    Negative: Chest pain    Negative: Pain is mainly in upper abdomen (if needed ask: 'is it mainly above the belly button?')    Negative: Abdominal pain and pregnant > 20 weeks    Negative: Abdominal pain and pregnant < 20 weeks    Negative: SEVERE abdominal pain (e.g., excruciating)    Negative: Vomiting red blood or black (coffee ground) material    Negative: Bloody, black, or tarry bowel movements    Negative: Constant abdominal pain lasting > 2 hours    Negative: Vomiting bile (green color)    Negative: Patient sounds very sick or weak to the triager    Negative: Abdominal pain is a chronic symptom (recurrent or ongoing AND lasting > 4 weeks)    Negative: Pain with sexual intercourse (dyspareunia)    Negative: Vomiting and abdomen looks much more swollen than usual    Negative: White of the eyes have turned yellow (i.e., jaundice)    Negative: Blood in urine (red, pink, or  "tea-colored)    Negative: Fever > 103 F (39.4 C)    Negative: Fever > 101 F (38.3 C) and over 60 years of age    Negative: Fever > 100.0 F (37.8 C) and has diabetes mellitus or a weak immune system (e.g., HIV positive, cancer chemotherapy, organ transplant, splenectomy, chronic steroids)    Negative: Fever > 100.0 F (37.8 C) and bedridden (e.g., nursing home patient, stroke, chronic illness, recovering from surgery)    Negative: Pregnant or could be pregnant (i.e., missed last menstrual period)    Age > 60 years    Answer Assessment - Initial Assessment Questions  1. LOCATION: \"Where does it hurt?\"       Initially in back and now moves around in abdomen  2. RADIATION: \"Does the pain shoot anywhere else?\" (e.g., chest, back)      Radiated when in back but now localized  3. ONSET: \"When did the pain begin?\" (e.g., minutes, hours or days ago)       4 days ago, off and on, positional-gets worse when bends over  4. SUDDEN: \"Gradual or sudden onset?\"      Gradual-attributed it to feeling constipation, straining w/ BM  5. PATTERN \"Does the pain come and go, or is it constant?\"     - If constant: \"Is it getting better, staying the same, or worsening?\"       (Note: Constant means the pain never goes away completely; most serious pain is constant and it progresses)      - If intermittent: \"How long does it last?\" \"Do you have pain now?\"      (Note: Intermittent means the pain goes away completely between bouts)      Off and on  6. SEVERITY: \"How bad is the pain?\"  (e.g., Scale 1-10; mild, moderate, or severe)    - MILD (1-3): doesn't interfere with normal activities, abdomen soft and not tender to touch     - MODERATE (4-7): interferes with normal activities or awakens from sleep, tender to touch     - SEVERE (8-10): excruciating pain, doubled over, unable to do any normal activities      Rates 7 when pain is there but sometimes has no pain, hurts when starts to bend  7. RECURRENT SYMPTOM: \"Have you ever had this type of " "abdominal pain before?\" If so, ask: \"When was the last time?\" and \"What happened that time?\"       none  8. CAUSE: \"What do you think is causing the abdominal pain?\"      Constipation but then had loose stool after taking senna, continuing to have loose stools, no BM today. Has had loose stools off and on  9. RELIEVING/AGGRAVATING FACTORS: \"What makes it better or worse?\" (e.g., movement, antacids, bowel movement)      Bending over makes worse, tylenol helps with pain  10. OTHER SYMPTOMS: \"Has there been any vomiting, diarrhea, constipation, or urine problems?\"        None, temp of 99.9   11. PREGNANCY: \"Is there any chance you are pregnant?\" \"When was your last menstrual period?\"        no    Protocols used: ABDOMINAL PAIN - FEMALE-A-OH      "

## 2020-05-08 ENCOUNTER — ANESTHESIA (OUTPATIENT)
Dept: SURGERY | Facility: CLINIC | Age: 85
DRG: 339 | End: 2020-05-08
Payer: MEDICARE

## 2020-05-08 ENCOUNTER — HOSPITAL ENCOUNTER (INPATIENT)
Facility: CLINIC | Age: 85
LOS: 5 days | Discharge: NURSING, REHAB OR LONG-TERM CARE | DRG: 339 | End: 2020-05-13
Attending: PHYSICIAN ASSISTANT | Admitting: SURGERY
Payer: MEDICARE

## 2020-05-08 ENCOUNTER — APPOINTMENT (OUTPATIENT)
Dept: CT IMAGING | Facility: CLINIC | Age: 85
DRG: 339 | End: 2020-05-08
Attending: PHYSICIAN ASSISTANT
Payer: MEDICARE

## 2020-05-08 ENCOUNTER — ANESTHESIA EVENT (OUTPATIENT)
Dept: SURGERY | Facility: CLINIC | Age: 85
DRG: 339 | End: 2020-05-08
Payer: MEDICARE

## 2020-05-08 ENCOUNTER — SURGERY (OUTPATIENT)
Age: 85
End: 2020-05-08
Payer: MEDICARE

## 2020-05-08 DIAGNOSIS — K37 APPENDICITIS: ICD-10-CM

## 2020-05-08 DIAGNOSIS — K35.33 ACUTE APPENDICITIS WITH PERFORATION, LOCALIZED PERITONITIS, AND ABSCESS, UNSPECIFIED WHETHER GANGRENE PRESENT: Primary | ICD-10-CM

## 2020-05-08 LAB
ALBUMIN SERPL-MCNC: 4.6 G/DL (ref 3.4–5)
ALP SERPL-CCNC: 71 U/L (ref 40–150)
ALT SERPL W P-5'-P-CCNC: 18 U/L (ref 0–50)
ANION GAP SERPL CALCULATED.3IONS-SCNC: 5 MMOL/L (ref 3–14)
AST SERPL W P-5'-P-CCNC: 19 U/L (ref 0–45)
BASOPHILS # BLD AUTO: 0 10E9/L (ref 0–0.2)
BASOPHILS NFR BLD AUTO: 0.3 %
BILIRUB SERPL-MCNC: 0.6 MG/DL (ref 0.2–1.3)
BUN SERPL-MCNC: 28 MG/DL (ref 7–30)
CALCIUM SERPL-MCNC: 10.1 MG/DL (ref 8.5–10.1)
CHLORIDE SERPL-SCNC: 102 MMOL/L (ref 94–109)
CO2 SERPL-SCNC: 29 MMOL/L (ref 20–32)
CREAT BLD-MCNC: 1.2 MG/DL (ref 0.52–1.04)
CREAT SERPL-MCNC: 1.13 MG/DL (ref 0.52–1.04)
DIFFERENTIAL METHOD BLD: ABNORMAL
EOSINOPHIL # BLD AUTO: 0.1 10E9/L (ref 0–0.7)
EOSINOPHIL NFR BLD AUTO: 1.2 %
ERYTHROCYTE [DISTWIDTH] IN BLOOD BY AUTOMATED COUNT: 12.3 % (ref 10–15)
GFR SERPL CREATININE-BSD FRML MDRD: 41 ML/MIN/{1.73_M2}
GFR SERPL CREATININE-BSD FRML MDRD: 42 ML/MIN/{1.73_M2}
GLUCOSE SERPL-MCNC: 112 MG/DL (ref 70–99)
HCT VFR BLD AUTO: 44.9 % (ref 35–47)
HGB BLD-MCNC: 14.5 G/DL (ref 11.7–15.7)
IMM GRANULOCYTES # BLD: 0.1 10E9/L (ref 0–0.4)
IMM GRANULOCYTES NFR BLD: 0.5 %
LACTATE BLD-SCNC: 1.8 MMOL/L (ref 0.7–2)
LIPASE SERPL-CCNC: 566 U/L (ref 73–393)
LYMPHOCYTES # BLD AUTO: 0.9 10E9/L (ref 0.8–5.3)
LYMPHOCYTES NFR BLD AUTO: 7.9 %
MCH RBC QN AUTO: 32.6 PG (ref 26.5–33)
MCHC RBC AUTO-ENTMCNC: 32.3 G/DL (ref 31.5–36.5)
MCV RBC AUTO: 101 FL (ref 78–100)
MONOCYTES # BLD AUTO: 0.6 10E9/L (ref 0–1.3)
MONOCYTES NFR BLD AUTO: 5.3 %
NEUTROPHILS # BLD AUTO: 9.8 10E9/L (ref 1.6–8.3)
NEUTROPHILS NFR BLD AUTO: 84.8 %
NRBC # BLD AUTO: 0 10*3/UL
NRBC BLD AUTO-RTO: 0 /100
PLATELET # BLD AUTO: 143 10E9/L (ref 150–450)
POTASSIUM SERPL-SCNC: 4.3 MMOL/L (ref 3.4–5.3)
PROT SERPL-MCNC: 7.9 G/DL (ref 6.8–8.8)
RADIOLOGIST FLAGS: ABNORMAL
RBC # BLD AUTO: 4.45 10E12/L (ref 3.8–5.2)
SARS-COV-2 PCR COMMENT: NORMAL
SARS-COV-2 RNA SPEC QL NAA+PROBE: NEGATIVE
SARS-COV-2 RNA SPEC QL NAA+PROBE: NORMAL
SODIUM SERPL-SCNC: 136 MMOL/L (ref 133–144)
SPECIMEN SOURCE: NORMAL
SPECIMEN SOURCE: NORMAL
WBC # BLD AUTO: 11.5 10E9/L (ref 4–11)

## 2020-05-08 PROCEDURE — 96361 HYDRATE IV INFUSION ADD-ON: CPT

## 2020-05-08 PROCEDURE — 25000125 ZZHC RX 250: Performed by: SURGERY

## 2020-05-08 PROCEDURE — 40000306 ZZH STATISTIC PRE PROC ASSESS II: Performed by: SURGERY

## 2020-05-08 PROCEDURE — 25800030 ZZH RX IP 258 OP 636

## 2020-05-08 PROCEDURE — 25000125 ZZHC RX 250: Performed by: NURSE ANESTHETIST, CERTIFIED REGISTERED

## 2020-05-08 PROCEDURE — 25000128 H RX IP 250 OP 636: Performed by: PHYSICIAN ASSISTANT

## 2020-05-08 PROCEDURE — 25000125 ZZHC RX 250: Performed by: PHYSICIAN ASSISTANT

## 2020-05-08 PROCEDURE — 25800025 ZZH RX 258: Performed by: SURGERY

## 2020-05-08 PROCEDURE — 25800030 ZZH RX IP 258 OP 636: Performed by: PHYSICIAN ASSISTANT

## 2020-05-08 PROCEDURE — 25000128 H RX IP 250 OP 636: Performed by: SURGERY

## 2020-05-08 PROCEDURE — 25000128 H RX IP 250 OP 636: Performed by: NURSE ANESTHETIST, CERTIFIED REGISTERED

## 2020-05-08 PROCEDURE — 88304 TISSUE EXAM BY PATHOLOGIST: CPT | Mod: 26 | Performed by: SURGERY

## 2020-05-08 PROCEDURE — 85025 COMPLETE CBC W/AUTO DIFF WBC: CPT | Performed by: PHYSICIAN ASSISTANT

## 2020-05-08 PROCEDURE — 99285 EMERGENCY DEPT VISIT HI MDM: CPT | Mod: 25

## 2020-05-08 PROCEDURE — 25800030 ZZH RX IP 258 OP 636: Performed by: SURGERY

## 2020-05-08 PROCEDURE — 44970 LAPAROSCOPY APPENDECTOMY: CPT | Mod: AS | Performed by: PHYSICIAN ASSISTANT

## 2020-05-08 PROCEDURE — 96365 THER/PROPH/DIAG IV INF INIT: CPT | Mod: 59

## 2020-05-08 PROCEDURE — 83690 ASSAY OF LIPASE: CPT | Performed by: PHYSICIAN ASSISTANT

## 2020-05-08 PROCEDURE — 37000009 ZZH ANESTHESIA TECHNICAL FEE, EACH ADDTL 15 MIN: Performed by: SURGERY

## 2020-05-08 PROCEDURE — 36000058 ZZH SURGERY LEVEL 3 EA 15 ADDTL MIN: Performed by: SURGERY

## 2020-05-08 PROCEDURE — 36000056 ZZH SURGERY LEVEL 3 1ST 30 MIN: Performed by: SURGERY

## 2020-05-08 PROCEDURE — 12000011 ZZH R&B MS OVERFLOW

## 2020-05-08 PROCEDURE — 0DTJ4ZZ RESECTION OF APPENDIX, PERCUTANEOUS ENDOSCOPIC APPROACH: ICD-10-PCS | Performed by: SURGERY

## 2020-05-08 PROCEDURE — 80053 COMPREHEN METABOLIC PANEL: CPT | Performed by: PHYSICIAN ASSISTANT

## 2020-05-08 PROCEDURE — 82565 ASSAY OF CREATININE: CPT

## 2020-05-08 PROCEDURE — 25000128 H RX IP 250 OP 636: Performed by: ANESTHESIOLOGY

## 2020-05-08 PROCEDURE — 27210794 ZZH OR GENERAL SUPPLY STERILE: Performed by: SURGERY

## 2020-05-08 PROCEDURE — 37000008 ZZH ANESTHESIA TECHNICAL FEE, 1ST 30 MIN: Performed by: SURGERY

## 2020-05-08 PROCEDURE — 74177 CT ABD & PELVIS W/CONTRAST: CPT | Mod: CS

## 2020-05-08 PROCEDURE — 25800030 ZZH RX IP 258 OP 636: Performed by: NURSE ANESTHETIST, CERTIFIED REGISTERED

## 2020-05-08 PROCEDURE — 88304 TISSUE EXAM BY PATHOLOGIST: CPT | Performed by: SURGERY

## 2020-05-08 PROCEDURE — 44970 LAPAROSCOPY APPENDECTOMY: CPT | Performed by: SURGERY

## 2020-05-08 PROCEDURE — 83605 ASSAY OF LACTIC ACID: CPT | Performed by: PHYSICIAN ASSISTANT

## 2020-05-08 PROCEDURE — 71000012 ZZH RECOVERY PHASE 1 LEVEL 1 FIRST HR: Performed by: SURGERY

## 2020-05-08 PROCEDURE — 25000128 H RX IP 250 OP 636

## 2020-05-08 PROCEDURE — 87635 SARS-COV-2 COVID-19 AMP PRB: CPT | Performed by: PHYSICIAN ASSISTANT

## 2020-05-08 RX ORDER — EPHEDRINE SULFATE 50 MG/ML
INJECTION, SOLUTION INTRAMUSCULAR; INTRAVENOUS; SUBCUTANEOUS PRN
Status: DISCONTINUED | OUTPATIENT
Start: 2020-05-08 | End: 2020-05-08

## 2020-05-08 RX ORDER — DEXAMETHASONE SODIUM PHOSPHATE 4 MG/ML
INJECTION, SOLUTION INTRA-ARTICULAR; INTRALESIONAL; INTRAMUSCULAR; INTRAVENOUS; SOFT TISSUE PRN
Status: DISCONTINUED | OUTPATIENT
Start: 2020-05-08 | End: 2020-05-08

## 2020-05-08 RX ORDER — OXYCODONE HYDROCHLORIDE 5 MG/1
5 TABLET ORAL
Status: DISCONTINUED | OUTPATIENT
Start: 2020-05-08 | End: 2020-05-13 | Stop reason: HOSPADM

## 2020-05-08 RX ORDER — LIDOCAINE 40 MG/G
CREAM TOPICAL
Status: DISCONTINUED | OUTPATIENT
Start: 2020-05-08 | End: 2020-05-10

## 2020-05-08 RX ORDER — DIMENHYDRINATE 50 MG/ML
25 INJECTION, SOLUTION INTRAMUSCULAR; INTRAVENOUS
Status: DISCONTINUED | OUTPATIENT
Start: 2020-05-08 | End: 2020-05-08 | Stop reason: HOSPADM

## 2020-05-08 RX ORDER — FENTANYL CITRATE 50 UG/ML
25-50 INJECTION, SOLUTION INTRAMUSCULAR; INTRAVENOUS
Status: DISCONTINUED | OUTPATIENT
Start: 2020-05-08 | End: 2020-05-08 | Stop reason: HOSPADM

## 2020-05-08 RX ORDER — METOPROLOL TARTRATE 50 MG
100 TABLET ORAL 2 TIMES DAILY
Status: DISCONTINUED | OUTPATIENT
Start: 2020-05-08 | End: 2020-05-09

## 2020-05-08 RX ORDER — FENTANYL CITRATE 50 UG/ML
INJECTION, SOLUTION INTRAMUSCULAR; INTRAVENOUS PRN
Status: DISCONTINUED | OUTPATIENT
Start: 2020-05-08 | End: 2020-05-08

## 2020-05-08 RX ORDER — LIDOCAINE HYDROCHLORIDE 10 MG/ML
INJECTION, SOLUTION INFILTRATION; PERINEURAL PRN
Status: DISCONTINUED | OUTPATIENT
Start: 2020-05-08 | End: 2020-05-08

## 2020-05-08 RX ORDER — HYDROMORPHONE HYDROCHLORIDE 1 MG/ML
0.2 INJECTION, SOLUTION INTRAMUSCULAR; INTRAVENOUS; SUBCUTANEOUS
Status: DISCONTINUED | OUTPATIENT
Start: 2020-05-08 | End: 2020-05-13 | Stop reason: HOSPADM

## 2020-05-08 RX ORDER — ONDANSETRON 2 MG/ML
4 INJECTION INTRAMUSCULAR; INTRAVENOUS EVERY 30 MIN PRN
Status: DISCONTINUED | OUTPATIENT
Start: 2020-05-08 | End: 2020-05-08 | Stop reason: HOSPADM

## 2020-05-08 RX ORDER — VITS A,C,E/LUTEIN/MINERALS 300MCG-200
1 TABLET ORAL DAILY
COMMUNITY

## 2020-05-08 RX ORDER — NALOXONE HYDROCHLORIDE 0.4 MG/ML
.1-.4 INJECTION, SOLUTION INTRAMUSCULAR; INTRAVENOUS; SUBCUTANEOUS
Status: DISCONTINUED | OUTPATIENT
Start: 2020-05-08 | End: 2020-05-13 | Stop reason: HOSPADM

## 2020-05-08 RX ORDER — BUPIVACAINE HYDROCHLORIDE 5 MG/ML
INJECTION, SOLUTION EPIDURAL; INTRACAUDAL PRN
Status: DISCONTINUED | OUTPATIENT
Start: 2020-05-08 | End: 2020-05-08 | Stop reason: HOSPADM

## 2020-05-08 RX ORDER — SODIUM CHLORIDE, SODIUM LACTATE, POTASSIUM CHLORIDE, CALCIUM CHLORIDE 600; 310; 30; 20 MG/100ML; MG/100ML; MG/100ML; MG/100ML
INJECTION, SOLUTION INTRAVENOUS CONTINUOUS
Status: DISCONTINUED | OUTPATIENT
Start: 2020-05-08 | End: 2020-05-08 | Stop reason: HOSPADM

## 2020-05-08 RX ORDER — SODIUM CHLORIDE, SODIUM LACTATE, POTASSIUM CHLORIDE, CALCIUM CHLORIDE 600; 310; 30; 20 MG/100ML; MG/100ML; MG/100ML; MG/100ML
INJECTION, SOLUTION INTRAVENOUS CONTINUOUS PRN
Status: DISCONTINUED | OUTPATIENT
Start: 2020-05-08 | End: 2020-05-08

## 2020-05-08 RX ORDER — SODIUM CHLORIDE 9 MG/ML
1000 INJECTION, SOLUTION INTRAVENOUS CONTINUOUS
Status: DISCONTINUED | OUTPATIENT
Start: 2020-05-08 | End: 2020-05-09

## 2020-05-08 RX ORDER — PROPOFOL 10 MG/ML
INJECTION, EMULSION INTRAVENOUS PRN
Status: DISCONTINUED | OUTPATIENT
Start: 2020-05-08 | End: 2020-05-08

## 2020-05-08 RX ORDER — ONDANSETRON 2 MG/ML
INJECTION INTRAMUSCULAR; INTRAVENOUS PRN
Status: DISCONTINUED | OUTPATIENT
Start: 2020-05-08 | End: 2020-05-08

## 2020-05-08 RX ORDER — ACETAMINOPHEN 325 MG/1
650 TABLET ORAL EVERY 4 HOURS PRN
Status: DISCONTINUED | OUTPATIENT
Start: 2020-05-11 | End: 2020-05-13 | Stop reason: HOSPADM

## 2020-05-08 RX ORDER — ACETAMINOPHEN 325 MG/1
975 TABLET ORAL EVERY 8 HOURS
Status: DISPENSED | OUTPATIENT
Start: 2020-05-08 | End: 2020-05-11

## 2020-05-08 RX ORDER — TIMOLOL MALEATE 5 MG/ML
1 SOLUTION/ DROPS OPHTHALMIC EVERY MORNING
COMMUNITY

## 2020-05-08 RX ORDER — LABETALOL 20 MG/4 ML (5 MG/ML) INTRAVENOUS SYRINGE
10
Status: DISCONTINUED | OUTPATIENT
Start: 2020-05-08 | End: 2020-05-08 | Stop reason: HOSPADM

## 2020-05-08 RX ORDER — NEOSTIGMINE METHYLSULFATE 1 MG/ML
VIAL (ML) INJECTION PRN
Status: DISCONTINUED | OUTPATIENT
Start: 2020-05-08 | End: 2020-05-08

## 2020-05-08 RX ORDER — HYDRALAZINE HYDROCHLORIDE 20 MG/ML
2.5-5 INJECTION INTRAMUSCULAR; INTRAVENOUS EVERY 10 MIN PRN
Status: DISCONTINUED | OUTPATIENT
Start: 2020-05-08 | End: 2020-05-08 | Stop reason: HOSPADM

## 2020-05-08 RX ORDER — DEXTROSE MONOHYDRATE, SODIUM CHLORIDE, AND POTASSIUM CHLORIDE 50; 1.49; 4.5 G/1000ML; G/1000ML; G/1000ML
INJECTION, SOLUTION INTRAVENOUS CONTINUOUS
Status: DISCONTINUED | OUTPATIENT
Start: 2020-05-08 | End: 2020-05-11

## 2020-05-08 RX ORDER — IOPAMIDOL 755 MG/ML
500 INJECTION, SOLUTION INTRAVASCULAR ONCE
Status: COMPLETED | OUTPATIENT
Start: 2020-05-08 | End: 2020-05-08

## 2020-05-08 RX ORDER — ONDANSETRON 4 MG/1
4 TABLET, ORALLY DISINTEGRATING ORAL EVERY 6 HOURS PRN
Status: DISCONTINUED | OUTPATIENT
Start: 2020-05-08 | End: 2020-05-13 | Stop reason: HOSPADM

## 2020-05-08 RX ORDER — ONDANSETRON 2 MG/ML
4 INJECTION INTRAMUSCULAR; INTRAVENOUS EVERY 6 HOURS PRN
Status: DISCONTINUED | OUTPATIENT
Start: 2020-05-08 | End: 2020-05-13 | Stop reason: HOSPADM

## 2020-05-08 RX ORDER — GLYCOPYRROLATE 0.2 MG/ML
INJECTION, SOLUTION INTRAMUSCULAR; INTRAVENOUS PRN
Status: DISCONTINUED | OUTPATIENT
Start: 2020-05-08 | End: 2020-05-08

## 2020-05-08 RX ORDER — PROCHLORPERAZINE MALEATE 5 MG
5 TABLET ORAL EVERY 6 HOURS PRN
Status: DISCONTINUED | OUTPATIENT
Start: 2020-05-08 | End: 2020-05-13 | Stop reason: HOSPADM

## 2020-05-08 RX ORDER — ONDANSETRON 4 MG/1
4 TABLET, ORALLY DISINTEGRATING ORAL EVERY 30 MIN PRN
Status: DISCONTINUED | OUTPATIENT
Start: 2020-05-08 | End: 2020-05-08 | Stop reason: HOSPADM

## 2020-05-08 RX ORDER — LABETALOL 20 MG/4 ML (5 MG/ML) INTRAVENOUS SYRINGE
PRN
Status: DISCONTINUED | OUTPATIENT
Start: 2020-05-08 | End: 2020-05-08

## 2020-05-08 RX ORDER — NALOXONE HYDROCHLORIDE 0.4 MG/ML
.1-.4 INJECTION, SOLUTION INTRAMUSCULAR; INTRAVENOUS; SUBCUTANEOUS
Status: DISCONTINUED | OUTPATIENT
Start: 2020-05-08 | End: 2020-05-09

## 2020-05-08 RX ADMIN — Medication 5 MG: at 17:26

## 2020-05-08 RX ADMIN — FENTANYL CITRATE 50 MCG: 50 INJECTION, SOLUTION INTRAMUSCULAR; INTRAVENOUS at 17:59

## 2020-05-08 RX ADMIN — LIDOCAINE HYDROCHLORIDE 50 MG: 10 INJECTION, SOLUTION INFILTRATION; PERINEURAL at 17:14

## 2020-05-08 RX ADMIN — GLYCOPYRROLATE 0.7 MG: 0.2 INJECTION, SOLUTION INTRAMUSCULAR; INTRAVENOUS at 18:36

## 2020-05-08 RX ADMIN — HYDRALAZINE HYDROCHLORIDE 5 MG: 20 INJECTION INTRAMUSCULAR; INTRAVENOUS at 19:16

## 2020-05-08 RX ADMIN — FENTANYL CITRATE 100 MCG: 50 INJECTION, SOLUTION INTRAMUSCULAR; INTRAVENOUS at 17:14

## 2020-05-08 RX ADMIN — Medication 5 MG: at 18:40

## 2020-05-08 RX ADMIN — TAZOBACTAM SODIUM AND PIPERACILLIN SODIUM 3.38 G: 375; 3 INJECTION, SOLUTION INTRAVENOUS at 17:55

## 2020-05-08 RX ADMIN — ROCURONIUM BROMIDE 20 MG: 10 INJECTION INTRAVENOUS at 17:21

## 2020-05-08 RX ADMIN — DEXAMETHASONE SODIUM PHOSPHATE 4 MG: 4 INJECTION, SOLUTION INTRA-ARTICULAR; INTRALESIONAL; INTRAMUSCULAR; INTRAVENOUS; SOFT TISSUE at 17:14

## 2020-05-08 RX ADMIN — FENTANYL CITRATE 50 MCG: 50 INJECTION, SOLUTION INTRAMUSCULAR; INTRAVENOUS at 17:40

## 2020-05-08 RX ADMIN — LABETALOL 20 MG/4 ML (5 MG/ML) INTRAVENOUS SYRINGE 10 MG: at 18:54

## 2020-05-08 RX ADMIN — HYDRALAZINE HYDROCHLORIDE 5 MG: 20 INJECTION INTRAMUSCULAR; INTRAVENOUS at 19:31

## 2020-05-08 RX ADMIN — TAZOBACTAM SODIUM AND PIPERACILLIN SODIUM 3.38 G: 375; 3 INJECTION, SOLUTION INTRAVENOUS at 13:03

## 2020-05-08 RX ADMIN — GLYCOPYRROLATE 0.1 MG: 0.2 INJECTION, SOLUTION INTRAMUSCULAR; INTRAVENOUS at 17:14

## 2020-05-08 RX ADMIN — SODIUM CHLORIDE 55 ML: 9 INJECTION, SOLUTION INTRAVENOUS at 11:19

## 2020-05-08 RX ADMIN — SODIUM CHLORIDE: 0.9 INJECTION, SOLUTION INTRAVENOUS at 17:08

## 2020-05-08 RX ADMIN — ONDANSETRON 4 MG: 2 INJECTION INTRAMUSCULAR; INTRAVENOUS at 20:35

## 2020-05-08 RX ADMIN — IOPAMIDOL 61 ML: 755 INJECTION, SOLUTION INTRAVENOUS at 11:19

## 2020-05-08 RX ADMIN — BUPIVACAINE HYDROCHLORIDE 12 ML: 5 INJECTION, SOLUTION EPIDURAL; INTRACAUDAL at 18:29

## 2020-05-08 RX ADMIN — ONDANSETRON HYDROCHLORIDE 4 MG: 2 INJECTION, SOLUTION INTRAVENOUS at 17:14

## 2020-05-08 RX ADMIN — PHENYLEPHRINE HYDROCHLORIDE 50 MCG: 10 INJECTION INTRAVENOUS at 17:29

## 2020-05-08 RX ADMIN — PROPOFOL 120 MG: 10 INJECTION, EMULSION INTRAVENOUS at 17:14

## 2020-05-08 RX ADMIN — FENTANYL CITRATE 25 MCG: 50 INJECTION, SOLUTION INTRAMUSCULAR; INTRAVENOUS at 19:20

## 2020-05-08 RX ADMIN — SODIUM CHLORIDE 1000 ML: 9 INJECTION, SOLUTION INTRAVENOUS at 12:14

## 2020-05-08 RX ADMIN — Medication 60 MG: at 17:14

## 2020-05-08 RX ADMIN — ROCURONIUM BROMIDE 20 MG: 10 INJECTION INTRAVENOUS at 17:50

## 2020-05-08 RX ADMIN — POTASSIUM CHLORIDE, DEXTROSE MONOHYDRATE AND SODIUM CHLORIDE: 150; 5; 450 INJECTION, SOLUTION INTRAVENOUS at 21:39

## 2020-05-08 RX ADMIN — PHENYLEPHRINE HYDROCHLORIDE 100 MCG: 10 INJECTION INTRAVENOUS at 17:32

## 2020-05-08 RX ADMIN — SODIUM CHLORIDE, POTASSIUM CHLORIDE, SODIUM LACTATE AND CALCIUM CHLORIDE: 600; 310; 30; 20 INJECTION, SOLUTION INTRAVENOUS at 17:47

## 2020-05-08 RX ADMIN — PHENYLEPHRINE HYDROCHLORIDE 100 MCG: 10 INJECTION INTRAVENOUS at 17:21

## 2020-05-08 RX ADMIN — SODIUM CHLORIDE 1200 ML: 900 IRRIGANT IRRIGATION at 18:29

## 2020-05-08 ASSESSMENT — ENCOUNTER SYMPTOMS
CHILLS: 0
NAUSEA: 1
FEVER: 0
BLOOD IN STOOL: 0
SHORTNESS OF BREATH: 1
ABDOMINAL PAIN: 1
FATIGUE: 1
DIARRHEA: 0
VOMITING: 0
DYSRHYTHMIAS: 0
BACK PAIN: 1
STRIDOR: 0

## 2020-05-08 ASSESSMENT — COPD QUESTIONNAIRES: COPD: 0

## 2020-05-08 ASSESSMENT — LIFESTYLE VARIABLES: TOBACCO_USE: 1

## 2020-05-08 NOTE — ANESTHESIA PREPROCEDURE EVALUATION
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 Pt has had prior anesthetic. Type: General and Regional    No history of anesthetic complications          ROS/MED HX    ENT/Pulmonary:     (+)CARON risk factors hypertension, tobacco use, Past use , . .   (-) asthma, COPD and recent URI   Neurologic:  - neg neurologic ROS   (+)CVA with deficits- right hand,     Cardiovascular:     (+) Dyslipidemia, hypertension----. : . . . :. valvular problems/murmurs type: MR . Previous cardiac testing date:results:date: results:ECG reviewed date:today results:NSR.  Nonspecific ST changes. date: results:         (-) CAD and arrhythmias   METS/Exercise Tolerance:     Hematologic: Comments: Lab Test        20                       1043          1428          WBC          11.5*        5.4           HGB          14.5         13.0          MCV          101*         99            PLT          143*         142*           Lab Test        20                       1043          1406          1416          NA           136          140          142           POTASSIUM    4.3          4.7          4.4           CHLORIDE     102          104          103           CO2          29           32           32            BUN          28            31*          34*           CR           1.13*        1.13*        1.11*         ANIONGAP     5            4            7             LEONARD          10.1         10.5*        10.1          GLC          112*         98           102*         - neg hematologic  ROS       Musculoskeletal:   (+) arthritis,  -       GI/Hepatic:     (+) GERD Asymptomatic on medication, appendicitis,       Renal/Genitourinary:  - ROS Renal section negative       Endo:  - neg endo ROS    (-) Type I DM, Type II DM, thyroid disease, chronic steroid usage and obesity   Psychiatric:  - neg psychiatric ROS       Infectious Disease:  - neg infectious disease ROS       Malignancy:      - no malignancy   Other:    - neg other ROS                      Physical Exam  Normal systems: cardiovascular, pulmonary and dental    Airway   Mallampati: II  TM distance: >3 FB  Neck ROM: full    Dental     Cardiovascular   Rhythm and rate: regular and normal  (-) no friction rub, no systolic click and no murmur    Pulmonary    breath sounds clear to auscultation(-) no rhonchi, no decreased breath sounds, no wheezes, no rales and no stridor            Lab Results   Component Value Date    WBC 11.5 (H) 05/08/2020    HGB 14.5 05/08/2020    HCT 44.9 05/08/2020     (L) 05/08/2020     05/08/2020    POTASSIUM 4.3 05/08/2020    CHLORIDE 102 05/08/2020    CO2 29 05/08/2020    BUN 28 05/08/2020    CR 1.13 (H) 05/08/2020     (H) 05/08/2020    LEONARD 10.1 05/08/2020    PHOS 3.0 11/23/2005    ALBUMIN 4.6 05/08/2020    PROTTOTAL 7.9 05/08/2020    ALT 18 05/08/2020    AST 19 05/08/2020    ALKPHOS 71 05/08/2020    BILITOTAL 0.6 05/08/2020    LIPASE 566 (H) 05/08/2020       Preop Vitals  BP Readings from Last 3 Encounters:   05/08/20 (!) 157/67   01/14/20 110/62   06/11/19 112/58    Pulse Readings from Last 3 Encounters:   05/08/20 68   01/14/20 64   06/11/19 66      Resp Readings from Last 3 Encounters:   05/08/20 20   01/14/20 16   06/11/19 16    SpO2 Readings  "from Last 3 Encounters:   05/08/20 98%   01/14/20 95%   06/11/19 95%      Temp Readings from Last 1 Encounters:   05/08/20 97.7  F (36.5  C)    Ht Readings from Last 1 Encounters:   01/14/20 1.556 m (5' 1.25\")      Wt Readings from Last 1 Encounters:   05/08/20 55.3 kg (122 lb)    Estimated body mass index is 22.86 kg/m  as calculated from the following:    Height as of 1/14/20: 1.556 m (5' 1.25\").    Weight as of this encounter: 55.3 kg (122 lb).       Anesthesia Plan      History & Physical Review  History and physical reviewed and following examination; no interval change.    ASA Status:  3 .    NPO Status:  > 8 hours    Plan for General with Intravenous induction.   PONV prophylaxis:  Ondansetron (or other 5HT-3) and Dexamethasone or Solumedrol         Postoperative Care  Postoperative pain management:  IV analgesics.      Consents  Anesthetic plan, risks, benefits and alternatives discussed with:  Patient or representative and Patient..                 Michael Shultz MD                    .  "

## 2020-05-08 NOTE — ED TRIAGE NOTES
Patient reports generalized abdominal pain and shortness of breath that started 4 days ago (Monday), followed by a low grade fever 99.9 a couple days later. Last night she developed nausea, vomiting and diarrhea along with muscle aches and continued abdominal pain that has now localized to her right lower quadrant area.

## 2020-05-08 NOTE — LETTER
Casie Farr MA/RN Case Manager  Inpatient Care Coordination  [facility]   [phone]

## 2020-05-08 NOTE — ED PROVIDER NOTES
History     Chief Complaint:    Abdominal Pain    The history is provided by the patient.      Dayana Samano is a 89 year old female with a history of hypertension, mitral regurgitation, CVA, bacterial endocarditis, CHF, and UTIs who presents with abdominal pain. The patient states that 4 days ago she was awoken by diffuse abdominal pain that radiated to her back. This was accompanied by shortness of breath, only present with pain. The shortness of breath is intermittent and improves with deep breathing. She states that it was a dull abdominal pain and was intermittent throughout the entire day. Her pain subsided the following day until evening when the diffuse cramping returned. The patient called her PCP yesterday and wad told to keep taking Tylenol. Her abdominal pain worsened last night and she took 1,000 mg of Tylenol today at 0700, but it has not provided pain alleviation. She states that the abdominal pain is in her RLQ today and is worse with movement. She endorses nausea, but denies vomiting, fevers, chills, or black or bloody stools. She reports loose stools over the last few days, but has not had a bowel movement today. The patient does not have a history of atrial fibrillation and denies any recent medication changes. As for abdominal surgeries, she has had 3  sections in the past.    Allergies:  Meperidine  Hydromorphone  Morphine    Medications:    Aspirin 325 mg  Calcium 600 + D  Lasix  Xalatan  Lisinopril  Lopressor  Klor-con    Past Medical History:    Adjustment disorder  Hypertension  Hearing loss  CVA  Endocarditis, bacterial  Osteoporosis  History of colonic polyps  History of skin cancer   GERD  Hypokalemia  UTI  Rotator cuff tear  Mitral regurgitation  Closed fracture of ramus of left pubis and right femur  Thrombocytopenia  Anemia  Elbow fracture, right  Pancytopenia   Macular degeneration  Cataracts, nuclear  Vitamin D deficiency    CHF  Anxiety  Diskitis  Glaucoma  Hypercholesterolemia   Osteoarthritis  History of urinary retention  Plantar fasciitis  Actinic keratosis  Seborrheic keratosis  Epidural abscess  Psoas abscess  Osteomyelitis  Closed fracture of multiple ribs of left side    Past Surgical History:    Femur surgery, nail, right intertrochanteric fracture   section x 3  Colonoscopy  Cataract removal, bilateral  Tonsillectomy  Rhinoplasty  ORIF, left elbow    Family History:    Cerebrovascular disease, PNA - father  CHF, coronary artery disease - mother    Social History:  Negative for tobacco use - former smoker, quit .  Positive for alcohol use - glass of wine with dinner sometimes.  Negative for drug use.  Marital Status:   [5]     Review of Systems   Constitutional: Positive for fatigue. Negative for chills and fever.   Respiratory: Positive for shortness of breath.    Gastrointestinal: Positive for abdominal pain and nausea. Negative for blood in stool, diarrhea (loose stools) and vomiting.   Musculoskeletal: Positive for back pain.   All other systems reviewed and are negative.       Physical Exam     Patient Vitals for the past 24 hrs:   BP Temp Pulse Heart Rate Resp SpO2 Weight   20 1300 (!) 162/83 -- 64 -- -- 98 % --   20 1200 (!) 157/67 -- 68 -- -- 98 % --   20 1145 (!) 157/73 -- 66 -- -- 98 % --   20 1115 -- -- -- -- -- 95 % --   20 1100 (!) 148/69 -- 66 -- -- 96 % --   20 1045 (!) 159/75 -- 68 -- -- 97 % --   20 1030 (!) 170/80 -- -- -- -- -- --   20 1020 (!) 158/76 97.7  F (36.5  C) -- 69 20 95 % 55.3 kg (122 lb)     Physical Exam  Constitutional: Pleasant. Cooperative.   Eyes: Pupils equally round and reactive  HENT: Head is normal in appearance. Oropharynx is normal with moist mucus membranes.  Cardiovascular: Regular rate and rhythm and without murmurs.  Respiratory: Normal respiratory effort, lungs are clear bilaterally.  GI: RLQ and RUQ tenderness  to palpation, RLQ worse. Otherwise non-tender, non-distended, soft. No guarding, rebound, or rigidity.  Musculoskeletal: No asymmetry of the lower extremities, no tenderness to palpation.   Skin: Normal, without rash.  Neurologic: Cranial nerves grossly intact, normal cognition, no focal deficits. Alert and oriented x 3.   Psychiatric: Normal affect.  Nursing notes and vital signs reviewed.    Emergency Department Course     Imaging:  Radiology findings were communicated with the patient who voiced understanding of the findings.    CT Abdomen Pelvis w/ IV contrast:   1.  Acute appendicitis with small developing periappendiceal abscess.   2.  Cholelithiasis without CT evidence of acute cholecystitis.   3.  Chronic-appearing dilation of the central biliary tree and the pancreatic duct. Associated with a small duodenal diverticulum. Correlate with LFTs for evidence of chronic partial obstruction, as per radiology.    Laboratory:  Laboratory findings were communicated with the patient who voiced understanding of the findings.    Creatinine POCT: Creatinine 1.2 H, GFR Estimate 42 L  Lipase: 566 H  Lactic acid whole blood (1112): 1.8  CMP: Glucose 112 H, Creatinine 1.13 H, GFR Estimate 41 L o/w WNL  CBC: WBC 11.5 H,  L o/w WNL (HGB 14.5)    COVID-19 Virus (Coronavirus), PCR NP Swab: pending       Interventions:  1214: 0.9% sodium chloride BOLUS 1 L IV  1303: Zosyn 3.375 g infusion IV    Emergency Department Course:  Past medical records, nursing notes, and vitals reviewed.    1034: I performed an exam of the patient as documented above.     IV was inserted and blood was drawn for laboratory testing, results above.    The patient provided a urine sample here in the emergency department. This was sent for laboratory testing, findings above.    The patient was sent for an abdomen pelvis CT while in the emergency department, results above.     1148: I spoke with Dr. Fransisco Ann from radiology regarding the patient's  critical CT result.    1238: I spoke with Dr. Kent from general surgery regarding the patient.    I rechecked the patient and discussed the results of her workup thus far.     I spoke with the patient's daughter regarding her work up and results per the request of the patient.    I personally reviewed the laboratory and imaging results with the Patient and answered all related questions prior to admission.     Findings and plan explained to the Patient who consents to admission.     Impression & Plan     Covid-19  Dayana Samano was evaluated during a global COVID-19 pandemic, which necessitated consideration that the patient might be at risk for infection with the SARS-CoV-2 virus that causes COVID-19.   Applicable protocols for evaluation were followed during the patient's care.   COVID-19 was considered as part of the patient's evaluation. The plan for testing is: a test was obtained during this visit.    Medical Decision Making:  Dayana Samano is a 89 year old female who presents to the ED for evaluation of abdominal pain.  Patient reports onset of diffuse abdominal pain that began 4 days ago this is worsened and migrated to her RLQ since that time.  See HPI as above for additional details.  Vitals and physical exam as above.  Differential was broad and included appendicitis, diverticulitis, UTI, constipation, SBO, stone, pyelo, among others.  The above work-up was obtained.  Imaging remarkable for acute appendicitis with small developing periappendiceal abscess.  Remainder of work-up as above.  Patient provided fluids here in the ED as well as initial dose of Zosyn.  I discussed the case with the hospitalist, who will bring the patient to the OR for definitive management, as per the wishes of the patient. Preo COVID swab performed. Patient sent to OR for definitive management of appendicitis.    Diagnosis:    ICD-10-CM    1. Appendicitis  K37      Disposition:  Transferred to surgery    Scribe Disclosure:  I  Sara Marmolejo, am serving as a scribe at 10:42 AM on 5/8/2020 to document services personally performed by Kobi Abrams PA-C based on my observations and the provider's statements to me.     Sara Marmolejo  5/8/2020   [facility] EMERGENCY DEPARTMENT       Kobi Abrams PA-C  05/08/20 1517

## 2020-05-08 NOTE — LETTER
Daughter, Marion is picking up patient at 1230pm    LORRIE Kilpatrick   Inpatient Care Coordination   Supervisor  [facility]  [phone]

## 2020-05-08 NOTE — ED NOTES
[facility]  ED Nurse Handoff Report    Dayana Samano is a 89 year old female   ED Chief complaint: Abdominal Pain; Nausea, Vomiting, & Diarrhea; and Shortness of Breath  . ED Diagnosis:   Final diagnoses:   Appendicitis     Allergies:   Allergies   Allergen Reactions     No Known Allergies        Code Status: Full Code  Activity level - Baseline/Home:  independent. Activity Level - Current:   Stand by Assist. Lift room needed: No. Bariatric: No   Needed: No   Isolation: No. Infection: Not Applicable.     Vital Signs:   Vitals:    05/08/20 1100 05/08/20 1115 05/08/20 1145 05/08/20 1200   BP: (!) 148/69  (!) 157/73 (!) 157/67   Pulse: 66  66 68   Resp:       Temp:       SpO2: 96% 95% 98% 98%   Weight:           Cardiac Rhythm:  ,      Pain level: 0-10 Pain Scale: 0  Patient confused: No. Patient Falls Risk: Yes.   Elimination Status: Has voided   Patient Report - Initial Complaint: abdominal pain. Focused AssessmentPatient reports generalized abdominal pain and shortness of breath that started 4 days ago (Monday), followed by a low grade fever 99.9 a couple days later. Last night she developed nausea, vomiting and diarrhea along with muscle aches and continued abdominal pain that has now localized to her right lower quadrant area. :    Tests Performed: labs, ct abd. Abnormal Results:   Labs Ordered and Resulted from Time of ED Arrival Up to the Time of Departure from the ED   CBC WITH PLATELETS DIFFERENTIAL - Abnormal; Notable for the following components:       Result Value    WBC 11.5 (*)      (*)     Platelet Count 143 (*)     Absolute Neutrophil 9.8 (*)     All other components within normal limits   COMPREHENSIVE METABOLIC PANEL - Abnormal; Notable for the following components:    Glucose 112 (*)     Creatinine 1.13 (*)     GFR Estimate 41 (*)     GFR Estimate If Black 48 (*)     All other components within normal limits   LIPASE - Abnormal; Notable for the following components:     Lipase 566 (*)     All other components within normal limits   CREATININE POCT - Abnormal; Notable for the following components:    Creatinine 1.2 (*)     GFR Estimate 42 (*)     GFR Estimate If Black 51 (*)     All other components within normal limits   LACTIC ACID WHOLE BLOOD   ROUTINE UA WITH MICROSCOPIC REFLEX TO CULTURE   COVID-19 VIRUS (CORONAVIRUS) BY PCR   PULSE OXIMETRY NURSING   ISTAT CREATININE NURSING POCT     CT Abdomen Pelvis w Contrast   Final Result   Abnormal   IMPRESSION:    1.  Acute appendicitis with small developing periappendiceal abscess.   2.  Cholelithiasis without CT evidence of acute cholecystitis.   3.  Chronic-appearing dilation of the central biliary tree and the   pancreatic duct. Associated with a small duodenal diverticulum.   Correlate with LFTs for evidence of chronic partial obstruction.      [Critical Result: Acute appendicitis]      Finding was identified on 5/8/2020 11:41 AM.       Dr. Abrams was contacted by me on 5/8/2020 11:48 AM and verbalized   understanding of the critical result.       RUI GILES MD        .   Treatments provided: see MAR  Family Comments: daughter Marion informed of admit and diagnosis  OBS brochure/video discussed/provided to patient:  N/A  ED Medications:   Medications   0.9% sodium chloride BOLUS (1,000 mLs Intravenous New Bag 5/8/20 1214)     Followed by   sodium chloride 0.9% infusion (has no administration in time range)   piperacillin-tazobactam (ZOSYN) infusion 3.375 g (has no administration in time range)   iopamidol (ISOVUE-370) solution 500 mL (61 mLs Intravenous Given 5/8/20 1119)   CT scan flush (55 mLs Intravenous Given 5/8/20 1119)     Drips infusing:  No  For the majority of the shift, the patient's behavior Green. Interventions performed were standard.    Sepsis treatment initiated: No       ED Nurse Name/Phone Number: Jazzy GASTON RN,   12:43 PM

## 2020-05-09 PROBLEM — K35.33 APPENDICITIS WITH ABSCESS: Status: ACTIVE | Noted: 2020-05-09

## 2020-05-09 LAB
ANION GAP SERPL CALCULATED.3IONS-SCNC: 5 MMOL/L (ref 3–14)
BASOPHILS # BLD AUTO: 0 10E9/L (ref 0–0.2)
BASOPHILS NFR BLD AUTO: 0.1 %
BUN SERPL-MCNC: 20 MG/DL (ref 7–30)
CALCIUM SERPL-MCNC: 8.9 MG/DL (ref 8.5–10.1)
CHLORIDE SERPL-SCNC: 102 MMOL/L (ref 94–109)
CO2 SERPL-SCNC: 25 MMOL/L (ref 20–32)
CREAT SERPL-MCNC: 1.12 MG/DL (ref 0.52–1.04)
DIFFERENTIAL METHOD BLD: ABNORMAL
EOSINOPHIL # BLD AUTO: 0 10E9/L (ref 0–0.7)
EOSINOPHIL NFR BLD AUTO: 0 %
ERYTHROCYTE [DISTWIDTH] IN BLOOD BY AUTOMATED COUNT: 12.6 % (ref 10–15)
GFR SERPL CREATININE-BSD FRML MDRD: 42 ML/MIN/{1.73_M2}
GLUCOSE BLDC GLUCOMTR-MCNC: 117 MG/DL (ref 70–99)
GLUCOSE BLDC GLUCOMTR-MCNC: 139 MG/DL (ref 70–99)
GLUCOSE BLDC GLUCOMTR-MCNC: 153 MG/DL (ref 70–99)
GLUCOSE SERPL-MCNC: 158 MG/DL (ref 70–99)
HBA1C MFR BLD: 5.4 % (ref 0–5.6)
HCT VFR BLD AUTO: 41 % (ref 35–47)
HGB BLD-MCNC: 13.1 G/DL (ref 11.7–15.7)
IMM GRANULOCYTES # BLD: 0.1 10E9/L (ref 0–0.4)
IMM GRANULOCYTES NFR BLD: 1 %
LYMPHOCYTES # BLD AUTO: 0.9 10E9/L (ref 0.8–5.3)
LYMPHOCYTES NFR BLD AUTO: 7 %
MCH RBC QN AUTO: 32.2 PG (ref 26.5–33)
MCHC RBC AUTO-ENTMCNC: 32 G/DL (ref 31.5–36.5)
MCV RBC AUTO: 101 FL (ref 78–100)
MONOCYTES # BLD AUTO: 0.5 10E9/L (ref 0–1.3)
MONOCYTES NFR BLD AUTO: 3.7 %
NEUTROPHILS # BLD AUTO: 11.9 10E9/L (ref 1.6–8.3)
NEUTROPHILS NFR BLD AUTO: 88.2 %
NRBC # BLD AUTO: 0 10*3/UL
NRBC BLD AUTO-RTO: 0 /100
PLATELET # BLD AUTO: 128 10E9/L (ref 150–450)
POTASSIUM SERPL-SCNC: 4.5 MMOL/L (ref 3.4–5.3)
RBC # BLD AUTO: 4.07 10E12/L (ref 3.8–5.2)
SODIUM SERPL-SCNC: 132 MMOL/L (ref 133–144)
WBC # BLD AUTO: 13.5 10E9/L (ref 4–11)

## 2020-05-09 PROCEDURE — 25000132 ZZH RX MED GY IP 250 OP 250 PS 637: Mod: GY | Performed by: SURGERY

## 2020-05-09 PROCEDURE — 25000128 H RX IP 250 OP 636: Performed by: SURGERY

## 2020-05-09 PROCEDURE — 36415 COLL VENOUS BLD VENIPUNCTURE: CPT | Performed by: PHYSICIAN ASSISTANT

## 2020-05-09 PROCEDURE — 25000132 ZZH RX MED GY IP 250 OP 250 PS 637: Mod: GY | Performed by: PHYSICIAN ASSISTANT

## 2020-05-09 PROCEDURE — 25800030 ZZH RX IP 258 OP 636: Performed by: SURGERY

## 2020-05-09 PROCEDURE — 99222 1ST HOSP IP/OBS MODERATE 55: CPT | Performed by: PHYSICIAN ASSISTANT

## 2020-05-09 PROCEDURE — 85025 COMPLETE CBC W/AUTO DIFF WBC: CPT | Performed by: PHYSICIAN ASSISTANT

## 2020-05-09 PROCEDURE — 83036 HEMOGLOBIN GLYCOSYLATED A1C: CPT | Performed by: PHYSICIAN ASSISTANT

## 2020-05-09 PROCEDURE — 12000011 ZZH R&B MS OVERFLOW

## 2020-05-09 PROCEDURE — 00000146 ZZHCL STATISTIC GLUCOSE BY METER IP

## 2020-05-09 PROCEDURE — 80048 BASIC METABOLIC PNL TOTAL CA: CPT | Performed by: PHYSICIAN ASSISTANT

## 2020-05-09 RX ORDER — HYDRALAZINE HYDROCHLORIDE 20 MG/ML
10 INJECTION INTRAMUSCULAR; INTRAVENOUS EVERY 4 HOURS PRN
Status: DISCONTINUED | OUTPATIENT
Start: 2020-05-09 | End: 2020-05-13 | Stop reason: HOSPADM

## 2020-05-09 RX ORDER — DEXTROSE MONOHYDRATE 25 G/50ML
25-50 INJECTION, SOLUTION INTRAVENOUS
Status: DISCONTINUED | OUTPATIENT
Start: 2020-05-09 | End: 2020-05-13 | Stop reason: HOSPADM

## 2020-05-09 RX ORDER — LATANOPROST 50 UG/ML
1 SOLUTION/ DROPS OPHTHALMIC DAILY
Status: DISCONTINUED | OUTPATIENT
Start: 2020-05-10 | End: 2020-05-10

## 2020-05-09 RX ORDER — NICOTINE POLACRILEX 4 MG
15-30 LOZENGE BUCCAL
Status: DISCONTINUED | OUTPATIENT
Start: 2020-05-09 | End: 2020-05-13 | Stop reason: HOSPADM

## 2020-05-09 RX ORDER — TIMOLOL MALEATE 5 MG/ML
1 SOLUTION/ DROPS OPHTHALMIC EVERY EVENING
Status: DISCONTINUED | OUTPATIENT
Start: 2020-05-09 | End: 2020-05-10

## 2020-05-09 RX ORDER — LISINOPRIL 40 MG/1
40 TABLET ORAL DAILY
Status: DISCONTINUED | OUTPATIENT
Start: 2020-05-09 | End: 2020-05-13 | Stop reason: HOSPADM

## 2020-05-09 RX ORDER — SALIVA STIMULANT COMB. NO.3
1 SPRAY, NON-AEROSOL (ML) MUCOUS MEMBRANE 4 TIMES DAILY PRN
Status: DISCONTINUED | OUTPATIENT
Start: 2020-05-09 | End: 2020-05-13 | Stop reason: HOSPADM

## 2020-05-09 RX ADMIN — TAZOBACTAM SODIUM AND PIPERACILLIN SODIUM 3.38 G: 375; 3 INJECTION, SOLUTION INTRAVENOUS at 00:25

## 2020-05-09 RX ADMIN — HYDROMORPHONE HYDROCHLORIDE 0.2 MG: 1 INJECTION, SOLUTION INTRAMUSCULAR; INTRAVENOUS; SUBCUTANEOUS at 14:04

## 2020-05-09 RX ADMIN — TAZOBACTAM SODIUM AND PIPERACILLIN SODIUM 3.38 G: 375; 3 INJECTION, SOLUTION INTRAVENOUS at 17:45

## 2020-05-09 RX ADMIN — TAZOBACTAM SODIUM AND PIPERACILLIN SODIUM 3.38 G: 375; 3 INJECTION, SOLUTION INTRAVENOUS at 12:47

## 2020-05-09 RX ADMIN — ACETAMINOPHEN 975 MG: 325 TABLET, FILM COATED ORAL at 21:29

## 2020-05-09 RX ADMIN — TAZOBACTAM SODIUM AND PIPERACILLIN SODIUM 3.38 G: 375; 3 INJECTION, SOLUTION INTRAVENOUS at 23:56

## 2020-05-09 RX ADMIN — Medication 1 SPRAY: at 10:12

## 2020-05-09 RX ADMIN — HYDROMORPHONE HYDROCHLORIDE 0.2 MG: 1 INJECTION, SOLUTION INTRAMUSCULAR; INTRAVENOUS; SUBCUTANEOUS at 11:34

## 2020-05-09 RX ADMIN — TAZOBACTAM SODIUM AND PIPERACILLIN SODIUM 3.38 G: 375; 3 INJECTION, SOLUTION INTRAVENOUS at 05:28

## 2020-05-09 RX ADMIN — PROCHLORPERAZINE EDISYLATE 5 MG: 5 INJECTION INTRAMUSCULAR; INTRAVENOUS at 11:47

## 2020-05-09 RX ADMIN — Medication 1 SPRAY: at 17:46

## 2020-05-09 RX ADMIN — POTASSIUM CHLORIDE, DEXTROSE MONOHYDRATE AND SODIUM CHLORIDE: 150; 5; 450 INJECTION, SOLUTION INTRAVENOUS at 14:05

## 2020-05-09 RX ADMIN — ONDANSETRON 4 MG: 2 INJECTION INTRAMUSCULAR; INTRAVENOUS at 10:12

## 2020-05-09 RX ADMIN — LATANOPROST 1 DROP: 50 SOLUTION OPHTHALMIC at 21:40

## 2020-05-09 RX ADMIN — ONDANSETRON 4 MG: 4 TABLET, ORALLY DISINTEGRATING ORAL at 21:28

## 2020-05-09 ASSESSMENT — ACTIVITIES OF DAILY LIVING (ADL)
RETIRED_COMMUNICATION: 0-->UNDERSTANDS/COMMUNICATES WITHOUT DIFFICULTY
TRANSFERRING: 1-->ASSISTIVE EQUIPMENT
BATHING: 0-->INDEPENDENT
COGNITION: 0 - NO COGNITION ISSUES REPORTED
FALL_HISTORY_WITHIN_LAST_SIX_MONTHS: NO
RETIRED_EATING: 0-->INDEPENDENT
DRESS: 0-->INDEPENDENT
AMBULATION: 1-->ASSISTIVE EQUIPMENT
SWALLOWING: 0-->SWALLOWS FOODS/LIQUIDS WITHOUT DIFFICULTY
TOILETING: 1-->ASSISTIVE EQUIPMENT

## 2020-05-09 NOTE — CONSULTS
Care Transition Initial Assessment - SW     Met with: Patient    Active Problems:    Acute appendicitis with perforation and localized peritonitis, with abscess    Appendicitis with abscess       DATA  Lives With: alone      Quality of Family Relationships: helpful, involved, supportive    Identified issues/concerns regarding health management: Discharge planning.      Quality of Family Relationships: helpful, involved, supportive     Patient reported her daughter would be able to transport.   ASSESSMENT  Cognitive Status:  awake and alert  Concerns to be addressed: Patient reported she fairly independent with ADL before this hospitalization. She noted she receiving assistance with shower and meals through facility staff. Staff also assistance with house keeping and cleaning. Patient reported she indepedent in managing her medications.  Her assist with transportation and shopping as needed.      PLAN  Continue to follow for discharge planning.

## 2020-05-09 NOTE — PLAN OF CARE
ROOM # 212-2    Living Situation (if not independent, order SW consult): [facility]  Facility name:  : Marion- daughter     Activity level at baseline: assist of 1  Activity level on admit: assist of 1      Patient registered to observation; given Patient Bill of Rights; given the opportunity to ask questions about observation status and their plan of care.  Patient has been oriented to the observation room, bathroom and call light is in place.    Discussed discharge goals and expectations with patient/family.

## 2020-05-09 NOTE — PHARMACY-ADMISSION MEDICATION HISTORY
Admission medication history interview status for this patient is complete. See [facility] admission navigator for allergy information, prior to admission medications and immunization status.     Medication history interview done via telephone during Covid-19 pandemic, indicate source(s): Patient  Medication history resources (including written lists, pill bottles, clinic record): [place], Care Everywhere  Primary pharmacy: [facility]    Changes made to PTA medication list:  Added: none  Deleted: none  Changed: none    Actions taken by pharmacist (provider contacted, etc): Called patient to verify med list     Additional medication history information:None    Medication reconciliation/reorder completed by provider prior to medication history?  Y    Prior to Admission medications    Medication Sig Last Dose Taking? Auth Provider   acetaminophen (TYLENOL) 500 MG tablet Take 1-2 tablets (500-1,000 mg) by mouth every 6 hours as needed for mild pain 5/8/2020 at Unknown time Yes Marion Alonso MD   aspirin (ASA) 325 MG tablet Take 325 mg by mouth daily 5/7/2020 at Unknown time Yes Reported, Patient   CALCIUM 600 + D 600-200 MG-UNIT OR TABS Take 1 tablet by mouth daily  5/8/2020 at Unknown time Yes CARLOS Olivas MD   furosemide (LASIX) 40 MG tablet TAKE 1 TABLET DAILY 5/7/2020 Yes Marion Alonso MD   hypromellose (ARTIFICIAL TEARS) 0.5 % SOLN ophthalmic solution 1 drop every hour as needed for dry eyes Past Week at Unknown time Yes Reported, Patient   latanoprost (XALATAN) 0.005 % ophthalmic solution Place 1 drop into both eyes daily 5/7/2020 at Unknown time Yes Reported, Patient   lisinopril (PRINIVIL/ZESTRIL) 40 MG tablet Take 1 tablet (40 mg) by mouth daily 5/8/2020 at Unknown time Yes Marion Alonso MD   metoprolol tartrate (LOPRESSOR) 100 MG tablet Take 1 tablet (100 mg) by mouth 2 times daily 5/8/2020 at Unknown time Yes Marion Alonso MD   multivitamin (OCUVITE) TABS tablet Take 1 tablet by mouth daily  5/8/2020 at Unknown time Yes Reported, Patient   potassium chloride ER (KLOR-CON) 20 MEQ CR tablet Take 1 tablet (20 mEq) by mouth daily 5/7/2020 at Unknown time Yes Marion Alonso MD   timolol maleate (TIMOPTIC) 0.5 % ophthalmic solution Place 1 drop into both eyes every evening  5/7/2020 at Unknown time Yes Unknown, Entered By History

## 2020-05-09 NOTE — PLAN OF CARE
PRIMARY DIAGNOSIS: s/p lap appendectomy  OUTPATIENT/OBSERVATION GOALS TO BE MET BEFORE DISCHARGE:  1. Stable vital signs Yes  2. Tolerating diet: clears only  3. Pain controlled with oral pain medications:  Yes  4. Positive bowel sounds:  Yes  5. Voiding without difficulty:  Yes  6. Able to ambulate:  Yes  7. Provider specific discharge goals met:  No    Discharge Planner Nurse   Safe discharge environment identified: No  Barriers to discharge: Yes       Entered by: Shikha Florez 05/09/2020 6:12 AM    VSS, pt is A&Ox4, up with assist x1, IVF @75, JPx1, incisions C/D/I, weaned to RA, intermittent IVABX, hospitalist consult in, tolerating clear liquid diet, will continue to monitor

## 2020-05-09 NOTE — ANESTHESIA POSTPROCEDURE EVALUATION
Patient: Dayana Samano    Procedure(s):  LAPAROSCOPIC APPENDECTOMY    Diagnosis:Acute appendicitis [K35.80]  Diagnosis Additional Information: No value filed.    Anesthesia Type:  General    Note:  Anesthesia Post Evaluation    Patient location during evaluation: PACU  Patient participation: Able to fully participate in evaluation  Level of consciousness: awake  Pain management: adequate  multimodal analgesia used between 6 hours prior to anesthesia start to PACU dischargeAirway patency: patent  Cardiovascular status: acceptable  Respiratory status: acceptable  Hydration status: acceptable  PONV: none             Last vitals:  Vitals:    05/08/20 1413 05/08/20 1857 05/08/20 1900   BP: (!) 179/71 (!) 157/134 (!) 185/106   Pulse:  83 80   Resp: 20 16 10   Temp: 98.8  F (37.1  C) 97.7  F (36.5  C)    SpO2: 97% 100% 100%         Electronically Signed By: Wilfrid Sadler MD  May 8, 2020  7:11 PM

## 2020-05-09 NOTE — CONSULTS
[facility]  Hospitalist Consult Note  Name: Dayana Samano    MRN: [id number]  YOB: 1924    Age: 89 year old  Date of admission: 5/8/2020  Primary care provider: Marion Alonso     Requesting Physician:  Dr. Kent, General Surgery   Reason for consult:  Post-operative medical management         Assessment and Plan:   Dayana Samano is a 89 year old female with a history of HTN, HFpEF, mitral regurgitation, prior CVA, CKD, who was admitted after acute RLQ abdominal pain for the past 4 days associated with nausea and anorexia.  CT abdomen and pelvis obtained in the ED revealed acute appendicitis with a small developing nazia-appendiceal abscess, lipase of 566. She was admitted by General Surgery for operative management.    #Acute Appendicitis with perforation and abscess s/p laproscopic Appendectomy with Dr. Kent on 5/8/2020:   The patient has post operative nausea/belching/gaseous discomfort, currently has well controlled pain and is hemodynamically stable. Concern for post operative ileus. Will defer diet, activity, DVT prophylaxis, and pain control to the primary team. Currently the patient is on IV Zosyn. Social work consulted for possible placement needs. Continue incentive spirometry and check hemoglobin to evaluate for surgical blood loss and potential need for transfusion.   - added artificial saliva for c/o dry mouth  - admitted to IP status, per Surgery will need several more days of IV abx    #Elevated Blood Glucose  Fasting 153, no hx of DM. Patient did receive Decadron intraoperatively.  -Add on A1c to labs  -POCT glucose checks  -Consider sliding scale insulin if sugars high once diet advanced    #CKD  Creatinine 1.2 near recent baseline, ~1.12 on admission.  - avoid nephrotoxic agents as able, NSAID's  - update BMP     #HTN  Blood pressures controlled.       #Mitral Regurgitation  #HFpEF  Appears compensated, euvolemic.   TTE last updated 2017 showing LVEF ~68%, Grade 2 LV  DD, poor visualization of mitral valve anatomy. Not on oral diuretics PTA.    - resume PTA metoprolol with hold parameters (awaiting pharmacy rec. For dosage)  - patient states she is not on diuretic PTA, medication reconciliation appears otherwise. Hold PTA lasix for now     Code status: Patient would like to be DNR/DNI. She requests code status change. Was full per admitting  Provider.  Prophylaxis: PCD's per surgical team  Disposition: Patient plans to discharge to home, lives at [facility] may need increased cares.     Thank you for the consultation, we will continue to follow along during the hospitalization. Please page with any questions or concerns.         History of Present Illness:   Dayana Samano is a 89 year old female who was hospitalized for acute RLQ abdominal pain for the past 4 days associated with nausea. She did have some mild symptoms prior to Monday, though she is not sure that was significant.  She has had some shortness of breath, that this may be her baseline.  Her abdominal pain radiated through to her back.   She has not had vomiting, but she has had nausea. She was taken to the OR for management of  acute appendicitis with perforation and abscess with Dr. Kent. Pre-operative note was fully reviewed and recommendations acknowledged. Op note and anesthesia notes and flow sheets reviewed.     The patient had no complications related to the procedure and has had an unremarkable post-operative course to this point. Currently pain is adequately controlled. She complains of dry mouth. No nausea, vomiting, diarrhea or constipation. She does have bloating and gas. No fevers, chills, diaphoresis. No chest pain, palpitations, dyspnea. Voided post op, baseline incontinence. Tolerating sips of clear liquids. No excessive somnolence and patient is fully alert and oriented. The patient has no other complaints at this time.      Daughter - Marion, updated with plan of care via phone.                 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     History of endocarditis      Osteoporosis, unspecified      Personal history of colonic polyps     last colonoscopy              Past Surgical History:     Past Surgical History:   Procedure Laterality Date     FEMUR SURGERY Right 2017    nail; right intertrochanteric fracture               Social History:     Social History     Tobacco Use     Smoking status: Former Smoker     Last attempt to quit: 1990     Years since quittin.3     Smokeless tobacco: Never Used   Substance Use Topics     Alcohol use: Yes     Comment: glass of wine with dinner-sometimes             Family History:   Family history was fully reviewed and non-contributory in this case.          Allergies:     Allergies   Allergen Reactions     No Known Allergies              Medications:     Prior to Admission medications    Medication Sig Last Dose Taking? Auth Provider   acetaminophen (TYLENOL) 500 MG tablet Take 1-2 tablets (500-1,000 mg) by mouth every 6 hours as needed for mild pain 2020 at Unknown time Yes Marion Alonso MD   aspirin (ASA) 325 MG tablet Take 325 mg by mouth daily 2020 at Unknown time Yes Reported, Patient   CALCIUM 600 + D 600-200 MG-UNIT OR TABS Take 1 tablet by mouth daily  2020 at Unknown time Yes CARLOS Olivas MD   furosemide (LASIX) 40 MG tablet TAKE 1 TABLET DAILY 2020 at Unknown time Yes Marion Alonso MD   hypromellose (ARTIFICIAL TEARS) 0.5 % SOLN ophthalmic solution 1 drop every hour as needed for dry eyes 2020 at Unknown time Yes Reported, Patient   latanoprost (XALATAN) 0.005 % ophthalmic solution Place 1 drop into both eyes daily 2020 at Unknown time Yes Reported, Patient   lisinopril (PRINIVIL/ZESTRIL) 40 MG tablet Take 1  tablet (40 mg) by mouth daily 5/8/2020 at Unknown time Yes Marion Alonso MD   metoprolol tartrate (LOPRESSOR) 100 MG tablet Take 1 tablet (100 mg) by mouth 2 times daily 5/8/2020 at Unknown time Yes Marion Alonso MD   multivitamin (OCUVITE) TABS tablet Take 1 tablet by mouth daily 5/8/2020 at Unknown time Yes Reported, Patient   potassium chloride ER (KLOR-CON) 20 MEQ CR tablet Take 1 tablet (20 mEq) by mouth daily 5/8/2020 at Unknown time Yes Marion Alonso MD   timolol maleate (TIMOPTIC) 0.5 % ophthalmic solution 1 drop 5/8/2020 at Unknown time Yes Unknown, Entered By History       Current hospital administered medication list (MAR) also reviewed.          Review of Systems:     A comprehensive greater than 10 system review of systems was carried out.  Pertinent positives and negatives are noted above.  Otherwise negative for contributory info.            Physical Exam:   Blood pressure 122/55, pulse 78, temperature 97.8  F (36.6  C), temperature source Oral, resp. rate 19, height 1.524 m (5'), weight 55.3 kg (122 lb), SpO2 96 %.  Exam:  General: Alert, awake, no acute distress.  HEENT: Normocephalic and atraumatic, eyes anicteric and without scleral injection, EOMI, face symmetric, MMM. Dry mucous membranes.  Cardiac: RRR, normal S1, S2. Soft systolic murmur, no LE edema.  Pulmonary: Normal chest rise, normal work of breathing.  Lungs CTAB without crackles or wheezing.  Abdomen: soft, tender, distended.  Normoactive bowel sounds, no guarding or rebound tenderness.  Extremities: no deformities.  Warm, well perfused.  Skin: no rashes or lesions.  Warm and Dry.  Neuro: No focal deficits.  Speech clear.  Spontaneously moving all extremities in bed.  Psych: Alert and oriented x3. Appropriate affect.          Data:   Imaging:  Reviewed.    EKG/Telemetry:  Reviewed.    Labs: Reviewed.       Melba Alvarado PA-C  [facility] Hospitalist  May 9, 2020

## 2020-05-09 NOTE — PLAN OF CARE
Vitals: /65  Pulse 74  Temp 98.2  F (Oral)  Resp 16  SpO2 90%       Neuro: WNL  Cardiac: WNL  Lungs: WNL  GI: Abdomen tender, rounded. Dressing C/D/I. SHELDON in place w/ serosang output. Has not passed gas yet. Severe, persistent nausea noted earlier in day w/ associated emesis and some spitting up. Zofran and compazine given with improvement noted later in afternoon.   : Retained urine; straight cathed for 600mL @ 14:00. DTV.  Pain: Dilaudid given x 2 with decrease in pain. Unable to tolerate PO medications.  IV: D5 1/2 NS w/ 20 K+ running at 75 between Zosyn doses.  Diet: Clears; tolerating a few bites of ice chips. No appetite.  Activity: SBA - ambulated in room.   Plan: Continue Zosyn. Await return of bowel function.

## 2020-05-09 NOTE — PROGRESS NOTES
Surgery-[place]  POD#1 s/p lap appy and drain placement for perforated appendicitis with abscess  Feels okay, sore with coughing, minimal flatus, denies nausea but feels bloated  97.8 P73  140mL from SHELDON  NAD, comfortable  Abd rounded, firm, diffusely tender, Steris dry, SHELDON serosanguinous  Doing as anticipated, ileus but not ill  Clears until consistent flatus  Discontinue capnography as she is getting minimal narcotics and this seems to be her primary concern  Zosyn for perforated appendix  Drain until discharge; remove if clears up  Unlikely to be ready for discharge tomorrow; will change over to inpatient  Appreciate hospitalist assistance

## 2020-05-09 NOTE — OP NOTE
General Surgery Operative Note    Pre-operative diagnosis:  Acute appendicitis with perforation and abscess.   Post-operative diagnosis:  Same   Procedure: Laparoscopic Appendectomy   Surgeon: Spenser Kent MD   Assistant(s):  Coty Mendoza PA-C   Anesthesia: General    Estimated blood loss: 5 cc's   Drains placed: Devyn   Complications:  None   Findings:   Appendix with multiple areas of gangrene and a perforation near the tip with an associated abscess.  This contains some fecal material.  There was minimal diffuse contamination, however, as the contamination seemed predominantly limited to the abscess itself.  There was a second area of gangrenous perforation near the base of the appendix, but this had relatively little spillage.     Indications for operation: This is a 89-year-old woman who presents with about 4 days of abdominal pain.  She had some mild discomfort prior to that as well.  CT scan in the emergency room showed acute appendicitis with an adjacent abscess.  The appendix contained a fecalith and was dilated to 17 mm.  There was an approximately 3 cm adjacent abscess.  Laparoscopic appendectomy was recommended, and the procedure, along with its risks and complications, was discussed with the patient.  She agreed to proceed.    Details of the operation: After informed consent, the patient was taken to the operating room where she underwent satisfactory induction of general anesthesia.  The patient was sterilely prepped and draped and a supraumbilical skin incision was made.  Dissection was carried bluntly down to the fascia, which was opened using electrocautery.  The Encarnacion trocar was introduced and fixed in place using stay sutures.  Pneumoperitoneum was achieved using CO2 insufflation and, under direct visualization, 2 lower abdominal 5 mm ports were placed.  There was an area of obvious fullness in the right lower quadrant.  Probing this area revealed an obvious abscess cavity, the contents  of which were immediately suctioned out.  The appendix itself was identified and was distended and inflamed.  The tip was essentially absent, as this was the site of the abscess.  It was a gangrenous area with a small amount of leakage of contents near the base of the appendix.  I was able to make a window in the mesentery near the base of the appendix, which was viable.  An Endo SANDIE stapler was fired across the cecum just below the base of the appendix.  A Sonocision device was now used to divide the mesoappendix.  The appendix was placed in an Endo Catch bag and brought out through the supraumbilical incision.  The abdomen was now irrigated out using normal saline.  There was relatively minimal contamination away from the area of the abscess.  A 15 [place] round Devyn drain was now placed into the abdomen through the most inferior port site.  This was laid across the abscess site and down into the pelvis.  It was sutured in place at the skin.  The trocar sites were infiltrated with 0.5% Marcaine.  The pneumoperitoneum was released and the trochars were removed.  The supraumbilical fascia was closed using interrupted 0 Vicryl sutures.  Skin incisions were closed using 4-0 subcuticular Vicryl followed by Steri-Strips.    The patient tolerated the procedure well and was transferred to the recovery room in satisfactory condition.  Sponge and needle counts were correct at the close of the case.    Specimens:   ID Type Source Tests Collected by Time Destination   A : APPENDIX Tissue Appendix SURGICAL PATHOLOGY EXAM Spenser Kent MD 5/8/2020  6:19 PM            Spenser Kent MD

## 2020-05-09 NOTE — ANESTHESIA CARE TRANSFER NOTE
Patient: Dayana Samano    Procedure(s):  LAPAROSCOPIC APPENDECTOMY    Diagnosis: Acute appendicitis [K35.80]  Diagnosis Additional Information: No value filed.    Anesthesia Type:   General     Note:  Airway :Face Mask  Patient transferred to:PACU  Comments: Pt to PACU, VSS, report to RNHandoff Report: Identifed the Patient, Identified the Reponsible Provider, Reviewed the pertinent medical history, Discussed the surgical course, Reviewed Intra-OP anesthesia mangement and issues during anesthesia, Set expectations for post-procedure period and Allowed opportunity for questions and acknowledgement of understanding      Vitals: (Last set prior to Anesthesia Care Transfer)    CRNA VITALS  5/8/2020 1834 - 5/8/2020 1904      5/8/2020             Temp:  (!) 64.2  F (17.9  C)                Electronically Signed By: AMADNA Sharma CRNA  May 8, 2020  7:04 PM

## 2020-05-09 NOTE — H&P
[facility]  [facility] - H&P     Dayana Samano MRN# [id number]   Age: 89 year old YOB: 1924     HPI:  Patient has been experiencing acute RLQ abdominal pain for the past 4 days associated with nausea and anorexia.  These symptoms have been increasing in severity.  She did have some mild symptoms prior to Monday, though she is not sure that was significant.  She has had some shortness of breath, that this may be her baseline.  Her abdominal pain radiates through to her back.  She has not had fevers.  She has not had vomiting, but she has had nausea.    History is obtained from the patient    Review Of Systems:  Respiratory: Shortness of breath-intermittent with deep breathing  Cardiovascular: History of congestive heart failure  Gastrointestinal: as above  Genitourinary: negative  All remaining review of systems negative except as stated in HPI.    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       Social History:  Social History     Tobacco Use     Smoking status: Former Smoker     Last attempt to quit: 1990     Years since quittin.3     Smokeless tobacco: Never Used   Substance Use Topics     Alcohol use: Yes     Comment: glass of wine with dinner-sometimes     Drug use: No       Family History:  Family History   Problem Relation Age of Onset     Cerebrovascular Disease Father          Physical Exam:  BP (!) 200/100   Pulse 82   Temp 97.7  F (36.5  C) (Temporal)   Resp 17   Ht 1.524 m (5')   Wt 55.3 kg (122 lb)   SpO2 99%   BMI 23.83 kg/m      General appearance: healthy, alert and mild distress.  Hydration: well hydrated  HEENT: normocephalic, atraumatic  Neck: no adenopathy  Lungs: normal and clear to auscultation  Heart: regular rate and rhythm   Abdomen: rounded, normal bowel sounds. Tenderness: present: RLQ moderate.  Masses: present, located RLQ.  Organomegaly: none  Skin: clear without rashes/lesions  Extremities: no gross deformities  Neuro: oriented to time & place, moves all extremities with normal strength, speech clear    Labs Reviewed:  Lab Results   Component Value Date    WBC 11.5 05/08/2020     Lab Results   Component Value Date    HGB 14.5 05/08/2020     Lab Results   Component Value Date     05/08/2020       Last Basic Metabolic Panel:  Lab Results   Component Value Date     05/08/2020      Lab Results   Component Value Date    POTASSIUM 4.3 05/08/2020     Lab Results   Component Value Date    CHLORIDE 102 05/08/2020     Lab Results   Component Value Date    LEONARD 10.1 05/08/2020     Lab Results   Component Value Date    CO2 29 05/08/2020     Lab Results   Component Value Date    BUN 28 05/08/2020     Lab Results   Component Value Date    CR 1.13 05/08/2020     Lab Results   Component Value Date     05/08/2020         Radiology:  CT abdomen reveals a dilated, thick-walled appendix with surrounding fat stranding.  Fecaliths are present.  There is an adjacent abscess.  The appendix itself measures approximately 17 mm in diameter.  All imaging studies reviewed by me.    ASSESSMENT/PLAN:  The patient's history, physical exam, laboratory and imaging studies are suspicious for acute appendicitis.  I have offered the patient laparoscopic appendectomy.  The risks, benefits, and alternatives have been discussed in detail.  The patient understands that there is  a possibility she will require a more extensive surgery, such as a colon resection.  All of the patient's questions have been answered.  They elect to proceed and we will go to the OR at the soonest availability.  Pre-operative antibiotics have been ordered.  I would anticipate an inpatient hospital stay, as she almost certainly has a ruptured appendix.    Spenser Kent MD

## 2020-05-10 LAB
GLUCOSE BLDC GLUCOMTR-MCNC: 104 MG/DL (ref 70–99)
GLUCOSE BLDC GLUCOMTR-MCNC: 94 MG/DL (ref 70–99)

## 2020-05-10 PROCEDURE — 25000132 ZZH RX MED GY IP 250 OP 250 PS 637: Mod: GY | Performed by: PHYSICIAN ASSISTANT

## 2020-05-10 PROCEDURE — 25800030 ZZH RX IP 258 OP 636: Performed by: SURGERY

## 2020-05-10 PROCEDURE — 00000146 ZZHCL STATISTIC GLUCOSE BY METER IP

## 2020-05-10 PROCEDURE — 25000132 ZZH RX MED GY IP 250 OP 250 PS 637: Mod: GY | Performed by: SURGERY

## 2020-05-10 PROCEDURE — 99232 SBSQ HOSP IP/OBS MODERATE 35: CPT | Performed by: PHYSICIAN ASSISTANT

## 2020-05-10 PROCEDURE — 25000128 H RX IP 250 OP 636: Performed by: SURGERY

## 2020-05-10 PROCEDURE — 25000125 ZZHC RX 250: Performed by: PHYSICIAN ASSISTANT

## 2020-05-10 PROCEDURE — 12000011 ZZH R&B MS OVERFLOW

## 2020-05-10 RX ORDER — LATANOPROST 50 UG/ML
1 SOLUTION/ DROPS OPHTHALMIC AT BEDTIME
Status: DISCONTINUED | OUTPATIENT
Start: 2020-05-10 | End: 2020-05-13 | Stop reason: HOSPADM

## 2020-05-10 RX ORDER — TIMOLOL MALEATE 5 MG/ML
1 SOLUTION/ DROPS OPHTHALMIC EVERY MORNING
Status: DISCONTINUED | OUTPATIENT
Start: 2020-05-10 | End: 2020-05-13 | Stop reason: HOSPADM

## 2020-05-10 RX ORDER — METOPROLOL TARTRATE 50 MG
100 TABLET ORAL 2 TIMES DAILY
Status: DISCONTINUED | OUTPATIENT
Start: 2020-05-10 | End: 2020-05-13 | Stop reason: HOSPADM

## 2020-05-10 RX ADMIN — TAZOBACTAM SODIUM AND PIPERACILLIN SODIUM 3.38 G: 375; 3 INJECTION, SOLUTION INTRAVENOUS at 18:10

## 2020-05-10 RX ADMIN — PROCHLORPERAZINE EDISYLATE 5 MG: 5 INJECTION INTRAMUSCULAR; INTRAVENOUS at 11:26

## 2020-05-10 RX ADMIN — ACETAMINOPHEN 975 MG: 325 TABLET, FILM COATED ORAL at 20:58

## 2020-05-10 RX ADMIN — TIMOLOL MALEATE 1 DROP: 5 SOLUTION/ DROPS OPHTHALMIC at 08:34

## 2020-05-10 RX ADMIN — LISINOPRIL 40 MG: 40 TABLET ORAL at 08:29

## 2020-05-10 RX ADMIN — METOPROLOL TARTRATE 100 MG: 50 TABLET, FILM COATED ORAL at 08:46

## 2020-05-10 RX ADMIN — Medication 1 SPRAY: at 11:34

## 2020-05-10 RX ADMIN — METOPROLOL TARTRATE 100 MG: 50 TABLET, FILM COATED ORAL at 19:29

## 2020-05-10 RX ADMIN — TAZOBACTAM SODIUM AND PIPERACILLIN SODIUM 3.38 G: 375; 3 INJECTION, SOLUTION INTRAVENOUS at 05:30

## 2020-05-10 RX ADMIN — Medication 1 SPRAY: at 23:32

## 2020-05-10 RX ADMIN — LATANOPROST 1 DROP: 50 SOLUTION/ DROPS OPHTHALMIC at 21:01

## 2020-05-10 RX ADMIN — HYDROMORPHONE HYDROCHLORIDE 0.2 MG: 1 INJECTION, SOLUTION INTRAMUSCULAR; INTRAVENOUS; SUBCUTANEOUS at 07:05

## 2020-05-10 RX ADMIN — HYDROMORPHONE HYDROCHLORIDE 0.2 MG: 1 INJECTION, SOLUTION INTRAMUSCULAR; INTRAVENOUS; SUBCUTANEOUS at 10:37

## 2020-05-10 RX ADMIN — ACETAMINOPHEN 975 MG: 325 TABLET, FILM COATED ORAL at 05:30

## 2020-05-10 RX ADMIN — POTASSIUM CHLORIDE, DEXTROSE MONOHYDRATE AND SODIUM CHLORIDE: 150; 5; 450 INJECTION, SOLUTION INTRAVENOUS at 05:30

## 2020-05-10 RX ADMIN — TAZOBACTAM SODIUM AND PIPERACILLIN SODIUM 3.38 G: 375; 3 INJECTION, SOLUTION INTRAVENOUS at 11:26

## 2020-05-10 RX ADMIN — ONDANSETRON 4 MG: 2 INJECTION INTRAMUSCULAR; INTRAVENOUS at 08:29

## 2020-05-10 NOTE — PLAN OF CARE
PRIMARY DIAGNOSIS: acute appendicitis  OUTPATIENT/OBSERVATION GOALS TO BE MET BEFORE DISCHARGE:  1. ADLs back to baseline: Yes    2. Activity and level of assistance: Up with standby assistance.    3. Pain status: Improved-controlled with oral pain medications.    4. Return to near baseline physical activity: Yes     Discharge Planner Nurse   Safe discharge environment identified: Yes  Barriers to discharge: No       Entered by: Simran Tmolinson 05/10/2020 3:35 AM     Please review provider order for any additional goals.   Nurse to notify provider when observation goals have been met and patient is ready for discharge.    The patient has vital signs that are consistent and stable.  Patient complains of very little pain.  Patient is retaining urine, has tried to void spontaneously several times with very little success- having to bladder scan and I&O'd once on shift around 9pm and emptied 550ml.  First attempt at I&O was unsuccessful and this caused a lot of stress, frustration and anxiety with the patient.  Patient is able to ambulate SBA.  Pt has a PIV and is receiving 75ml/hr of D5 with KCL and zosyn every 6hrs IV.  Pt has hypoactive bowel movements and plan is to wait on bowel function.

## 2020-05-10 NOTE — PROGRESS NOTES
[facility]  Hospitalist Progress Note  Crissy العراقي PA-C 05/10/2020    Reason for Stay (Diagnosis): s/p perf appy         Assessment and Plan:      Summary of Stay: Dayana Samano is a 89 year old female with a history of HTN, HFpEF, mitral regurgitation, prior CVA, CKD, who was admitted after acute RLQ abdominal pain for the past 4 days associated with nausea and anorexia.  CT abdomen and pelvis obtained in the ED revealed acute appendicitis with a small developing nazia-appendiceal abscess.      # POD 2 s/p acute appendicitis with perforation and abscess s/p laproscopic appendectomy:   --c/o post op nausea and poor po intake due to abd distention  --has post op ileus, await return of bowl function  --Increase ambulation  --Sips of CLD for now and diet advancement per primary     #Brief hypoxia  --mild episode of 89% this am but does have dec BS and suspect atelectasis  --need to increase IS, pulm toilet    #Elevated Blood Glucose  --Inially elevated post op but likely 2/2 stress response  --A1c 5.4  --No need to continue BG checks      #CKD-close to basline  Creatinine 1.2 near recent baseline, ~1.12 on admission.  - avoid nephrotoxic agents as able, NSAID's  - check BMP tomorrow      #HTN  --Blood pressures slightly labile but meds now reconciled.   --Continue Metoprolol BID and Lisinopril      #Mitral Regurgitation  #HFpEF  Appears compensated, euvolemic.   TTE last updated 2017 showing LVEF ~68%, Grade 2 LV DD, poor visualization of mitral valve anatomy.    - resume PTA metoprolol with hold parameters   - Hold PTA lasix for now      Code status: DNR/DNI  Prophylaxis: PCD's per surgical team, increase ambulation, apparently SBA so no PT needs   Disposition: Patient plans to discharge to home, lives at [facility] may need increased cares.           Interval History (Subjective):      Feeling ok, still a bit nauseated but no vomiting.   Belly somewhat distended but no flatus yet.                    Physical Exam:      Last Vital Signs:  BP (!) 165/65 (BP Location: Right arm)   Pulse 74   Temp 97.8  F (36.6  C) (Oral)   Resp 16   Ht 1.524 m (5')   Wt 55.3 kg (122 lb)   SpO2 92%   BMI 23.83 kg/m        Constitutional: Awake, alert, cooperative, no apparent distress   Respiratory: Clear to auscultation bilaterally, no crackles or wheezing   Cardiovascular: Regular rate and rhythm, normal S1 and S2, and no murmur noted   Abdomen: Normal bowel sounds, soft, minimally distended, mild tenderness   Skin: No rashes, no cyanosis, dry to touch   Neuro: Alert and oriented x3, no weakness, numbness, memory loss   Extremities: No edema, normal range of motion   Other(s):        All other systems: Negative          Medications:      All current medications were reviewed with changes reflected in problem list.         Data:      All new lab and imaging data was reviewed.   Labs:       Lab Results   Component Value Date     05/09/2020     05/08/2020     01/14/2020    Lab Results   Component Value Date    CHLORIDE 102 05/09/2020    CHLORIDE 102 05/08/2020    CHLORIDE 104 01/14/2020    Lab Results   Component Value Date    BUN 20 05/09/2020    BUN 28 05/08/2020    BUN 31 01/14/2020      Lab Results   Component Value Date    POTASSIUM 4.5 05/09/2020    POTASSIUM 4.3 05/08/2020    POTASSIUM 4.7 01/14/2020    Lab Results   Component Value Date    CO2 25 05/09/2020    CO2 29 05/08/2020    CO2 32 01/14/2020    Lab Results   Component Value Date    CR 1.12 05/09/2020    CR 1.13 05/08/2020    CR 1.13 01/14/2020        Recent Labs   Lab 05/09/20  0909 05/08/20  1043   WBC 13.5* 11.5*   HGB 13.1 14.5   HCT 41.0 44.9   * 101*   * 143*      Imaging:   Results for orders placed or performed during the hospital encounter of 05/08/20   CT Abdomen Pelvis w Contrast     Value    Radiologist flags Acute appendicitis (AA)    Narrative    CT ABDOMEN AND PELVIS WITH CONTRAST 5/8/2020 11:34 AM    CLINICAL  HISTORY: Diffuse abdominal pain, right lower quadrant worse.    TECHNIQUE: CT scan of the abdomen and pelvis was performed following  injection of IV contrast. Multiplanar reformats were obtained. Dose  reduction techniques were used.  CONTRAST: 61 mL Isovue-370.    COMPARISON: None.    FINDINGS:   LOWER CHEST: Normal.    HEPATOBILIARY: Benign-appearing low-density lesions in the left lobe  of the liver are likely cysts and require no further follow-up. There  is radiodense material in the dependent gallbladder, stones versus  sludge. The central biliary tree is prominent with the common bile  duct measuring up to 1.0 cm, and the pancreatic duct is also dilated.  There appears to be a small duodenal diverticulum near the ampulla,  and this is likely related to chronic partial obstruction. No  worrisome focal liver lesions.    PANCREAS: Dilated pancreatic duct, otherwise unremarkable.    SPLEEN: Calcified granulomata.    ADRENAL GLANDS: Mild nonspecific thickening of the left adrenal gland.  Right adrenal gland normal.    KIDNEYS/BLADDER: No hydronephrosis or solid renal mass. Urinary  bladder unremarkable.    BOWEL: Dilated, inflamed-appearing appendix with an appendicolith. The  appendix measures up to 1.7 cm. Adjacent to the appendix, there is a  loculated low-density collection that measures 2.8 cm (series 3, image  33), likely a developing periappendiceal abscess. No free  intraperitoneal air. No bowel obstruction. There is sigmoid  diverticulosis without diverticulitis.    PELVIC ORGANS: Normal.    ADDITIONAL FINDINGS: No ascites. Nonaneurysmal aortic atherosclerosis.    MUSCULOSKELETAL: Prior right hip fixation. Multiple healed pelvic  fractures. No lytic or blastic lesions.      Impression    IMPRESSION:   1.  Acute appendicitis with small developing periappendiceal abscess.  2.  Cholelithiasis without CT evidence of acute cholecystitis.  3.  Chronic-appearing dilation of the central biliary tree and  the  pancreatic duct. Associated with a small duodenal diverticulum.  Correlate with LFTs for evidence of chronic partial obstruction.    [Critical Result: Acute appendicitis]    Finding was identified on 5/8/2020 11:41 AM.     Dr. Abrams was contacted by me on 5/8/2020 11:48 AM and verbalized  understanding of the critical result.     RUI GILES MD

## 2020-05-10 NOTE — PROGRESS NOTES
[facility]    General Surgery  Daily Post-Op Note       Assessment and Plan:   Dayana Samano is a 89 year old female S/P Procedure(s):  LAPAROSCOPIC APPENDECTOMY, abscess debridement and drain placement, 2 Days Post-Op  Perforated appendicitis with abscess. Afebrile.  Postop Ileus  Urinary retention    Pain: tylenol and oxycodone  Diet: clear liquids. Wait to advance until ROBF and abdomen less distended  IVFs: D5 .45%+K 75ml/h  Activity: up ad glenn  Antibiotics: zosyn   DVT prophylaxis: lovenox   Martinez for urinary retention  SHELDON - remove prior to discharge  Consults: hospitalist  Dispo: pending resolution of ileus, advancing diet.        Interval History:   Pt states she feels better. Still bloated feeling, some nausea. Not much pain. Able to get out of bed and ambulate. Unable to empty bladder, straight cath x 3 now.  No flatus or BM. Not drinking much, just sips with meds           Physical Exam:   Temp: 97.8  F (36.6  C) Temp src: Oral BP: (!) 165/65 Pulse: 74 Heart Rate: 77 Resp: 16 SpO2: 92 % O2 Device: None (Room air)      I/O last 3 completed shifts:  In: 1787.5 [I.V.:1787.5]  Out: 2015 [Urine:1950; Drains:65]      Constitutional: healthy, alert and no distress   Respiratory: negative  Abdomen: Abdomen distended, mildly tender. Incisions c/d/i. Bulb serosanguinous.      Data   Recent Labs   Lab 05/09/20  0909 05/08/20  1043   WBC 13.5* 11.5*   HGB 13.1 14.5   * 101*   * 143*   * 136   POTASSIUM 4.5 4.3   CHLORIDE 102 102   CO2 25 29   BUN 20 28   CR 1.12* 1.13*   ANIONGAP 5 5   LEONARD 8.9 10.1   * 112*   ALBUMIN  --  4.6   PROTTOTAL  --  7.9   BILITOTAL  --  0.6   ALKPHOS  --  71   ALT  --  18   AST  --  19         Lissett Lynne MD

## 2020-05-10 NOTE — PLAN OF CARE
Vitals: B/P: 165/65, T: 97.8, P: 74, R: 16    Neuro: WNL  Cardiac: WNL  Lungs: WNL  GI: Abdomen tender, rounded. Dressing C/D/I. SHELDON in place w/ serosang output. Has not passed gas yet. Nausea noted. Belching often. Zofran and compazine given with improvement noted later in afternoon.   : Martinez placed for retention.   Pain: Dilaudid given x 2 with decrease in pain. Tolerated PO BP meds this AM with tiny bite of applesauce (OK per hospitalist PARafiC).   IV: D5 1/2 NS w/ 20 K+ running at 75 between Zosyn doses.  Diet: Clears; tolerating more sips of water today. No appetite.  Activity: SBA - ambulated in room.   Plan: Patient rested. Continue Zosyn. Await return of bowel function.

## 2020-05-11 LAB
ANION GAP SERPL CALCULATED.3IONS-SCNC: 4 MMOL/L (ref 3–14)
BUN SERPL-MCNC: 12 MG/DL (ref 7–30)
CALCIUM SERPL-MCNC: 8.5 MG/DL (ref 8.5–10.1)
CHLORIDE SERPL-SCNC: 108 MMOL/L (ref 94–109)
CO2 SERPL-SCNC: 26 MMOL/L (ref 20–32)
CREAT SERPL-MCNC: 1.05 MG/DL (ref 0.52–1.04)
GFR SERPL CREATININE-BSD FRML MDRD: 45 ML/MIN/{1.73_M2}
GLUCOSE SERPL-MCNC: 113 MG/DL (ref 70–99)
INTERPRETATION ECG - MUSE: NORMAL
MAGNESIUM SERPL-MCNC: 2 MG/DL (ref 1.6–2.3)
POTASSIUM SERPL-SCNC: 3.9 MMOL/L (ref 3.4–5.3)
SODIUM SERPL-SCNC: 138 MMOL/L (ref 133–144)

## 2020-05-11 PROCEDURE — 25000132 ZZH RX MED GY IP 250 OP 250 PS 637: Mod: GY | Performed by: PHYSICIAN ASSISTANT

## 2020-05-11 PROCEDURE — 83735 ASSAY OF MAGNESIUM: CPT | Performed by: PHYSICIAN ASSISTANT

## 2020-05-11 PROCEDURE — 25000128 H RX IP 250 OP 636: Performed by: SURGERY

## 2020-05-11 PROCEDURE — 80048 BASIC METABOLIC PNL TOTAL CA: CPT | Performed by: PHYSICIAN ASSISTANT

## 2020-05-11 PROCEDURE — 25000132 ZZH RX MED GY IP 250 OP 250 PS 637: Mod: GY | Performed by: SURGERY

## 2020-05-11 PROCEDURE — 25800030 ZZH RX IP 258 OP 636: Performed by: SURGERY

## 2020-05-11 PROCEDURE — 12000011 ZZH R&B MS OVERFLOW

## 2020-05-11 PROCEDURE — 99233 SBSQ HOSP IP/OBS HIGH 50: CPT | Performed by: PHYSICIAN ASSISTANT

## 2020-05-11 PROCEDURE — 36415 COLL VENOUS BLD VENIPUNCTURE: CPT | Performed by: PHYSICIAN ASSISTANT

## 2020-05-11 RX ORDER — FUROSEMIDE 40 MG
40 TABLET ORAL DAILY
Status: DISCONTINUED | OUTPATIENT
Start: 2020-05-11 | End: 2020-05-13 | Stop reason: HOSPADM

## 2020-05-11 RX ADMIN — ACETAMINOPHEN 975 MG: 325 TABLET, FILM COATED ORAL at 05:42

## 2020-05-11 RX ADMIN — TIMOLOL MALEATE 1 DROP: 5 SOLUTION/ DROPS OPHTHALMIC at 10:46

## 2020-05-11 RX ADMIN — LISINOPRIL 40 MG: 40 TABLET ORAL at 10:45

## 2020-05-11 RX ADMIN — Medication 1 SPRAY: at 23:47

## 2020-05-11 RX ADMIN — METOPROLOL TARTRATE 100 MG: 50 TABLET, FILM COATED ORAL at 19:39

## 2020-05-11 RX ADMIN — LATANOPROST 1 DROP: 50 SOLUTION/ DROPS OPHTHALMIC at 21:26

## 2020-05-11 RX ADMIN — FUROSEMIDE 40 MG: 40 TABLET ORAL at 14:19

## 2020-05-11 RX ADMIN — TAZOBACTAM SODIUM AND PIPERACILLIN SODIUM 2.25 G: 250; 2 INJECTION, SOLUTION INTRAVENOUS at 23:48

## 2020-05-11 RX ADMIN — METOPROLOL TARTRATE 100 MG: 50 TABLET, FILM COATED ORAL at 10:45

## 2020-05-11 RX ADMIN — TAZOBACTAM SODIUM AND PIPERACILLIN SODIUM 3.38 G: 375; 3 INJECTION, SOLUTION INTRAVENOUS at 00:27

## 2020-05-11 RX ADMIN — ACETAMINOPHEN 975 MG: 325 TABLET, FILM COATED ORAL at 13:02

## 2020-05-11 RX ADMIN — TAZOBACTAM SODIUM AND PIPERACILLIN SODIUM 2.25 G: 250; 2 INJECTION, SOLUTION INTRAVENOUS at 11:49

## 2020-05-11 RX ADMIN — TAZOBACTAM SODIUM AND PIPERACILLIN SODIUM 3.38 G: 375; 3 INJECTION, SOLUTION INTRAVENOUS at 05:42

## 2020-05-11 RX ADMIN — TAZOBACTAM SODIUM AND PIPERACILLIN SODIUM 2.25 G: 250; 2 INJECTION, SOLUTION INTRAVENOUS at 17:54

## 2020-05-11 RX ADMIN — POTASSIUM CHLORIDE, DEXTROSE MONOHYDRATE AND SODIUM CHLORIDE: 150; 5; 450 INJECTION, SOLUTION INTRAVENOUS at 13:02

## 2020-05-11 NOTE — PROGRESS NOTES
[id number]      Inpatient Progress Note:    /49 (BP Location: Right arm)   Pulse 74   Temp 97  F (36.1  C) (Oral)   Resp 16   Ht 1.524 m (5')   Wt 55.3 kg (122 lb)   SpO2 90%   BMI 23.83 kg/m         Orientation: Alert and Oriented x4  Neuro: Intact  Pain status: Denies, reported she has pain with ambulation, sleeping well, tylenol given  Activity: SBA with GB and WW  Peripheral edema: None  Resp: CTAB   Cardiac: WDL  GI: Bowel sounds active, large amount of loose yellow stool, incontinent, passing large amounts of gas   :  Catheter draining well  Skin: Incision sites CDI with steri strips, SHELDON dressing scant drainage,   LDA: PIV, SHELDON Drain  Infusions: D5 1/2NS with 20K at 75 ml/hr  Pertinent Labs: BMP and Magnesium in AM  Diet: Tolerating Clears   Consults: Surgery following  Discharge Plan: Pending medical clearance     Reilly Woods RN

## 2020-05-11 NOTE — PLAN OF CARE
Vitals:      Neuro: Patient is alert, orientated x 4, pleasant, cooperative with cares  Cardiac: WDL  Lungs: LS clear, adequate sats on room air.    GI: BS positive, Lap insertions sites dry, intact, steri strips intact.  SHELDON dressing dry, intact.  SHELDON drainage serosanguenous.  30 mls out this shift.    : Martinez patent, adequate urine output.  .   Pain:  Patient tolerating transferring to chair, ambulation in room, c/o of mild abdominal pain, resolved with rest.    IV: D5 1/2 NS w/ 20 K+ continues at 75 ccs/hour,.    Diet:  Patient tolerating clear liquid meal tray, however amounts small.    Activity: SBA - ambulated in room. Up in chair at bedside for 1.5 hours,    Plan:  Continue to provide supportive cares.

## 2020-05-11 NOTE — PROGRESS NOTES
[facility]    General Surgery  Daily Post-Op Note       Assessment and Plan:   Dayana Samano is a 89 year old female S/P Procedure(s):  LAPAROSCOPIC APPENDECTOMY, abscess debridement and drain placement, 3 Days Post-Op  Perforated appendicitis with abscess. Afebrile.  Postop Ileus as expected  Urinary retention, mccord in place    Pain: tylenol and oxycodone  Diet: clear liquids. Encourage clear protein drink. Wait to advance until abdomen less distended  IVFs: D5 .45%+K 75ml/h  Activity: up ad glenn  Antibiotics: zosyn   DVT prophylaxis: lovenox   Mccord for urinary retention  SHELDON - remove prior to discharge  Appreciate hospitalist assistance  Dispo: pending resolution of ileus/bloating.        Interval History:   No c/o, pain controlled, denies bloating or nausea. Drinking clear liquids for dry mouth, little appetite but states little appetite at baseline.  + loose BM this am, also believes she is passing incontinent stool during exam.  Up walking in room so far.           Physical Exam:   Temp: 97.5  F (36.4  C) Temp src: Oral BP: (!) 152/56 Pulse: 63 Heart Rate: 74 Resp: 16 SpO2: 91 % O2 Device: None (Room air)      I/O last 3 completed shifts:  In: -   Out: 2080 [Urine:1950; Drains:130]      Constitutional: healthy, alert and no distress   Respiratory: negative  Abdomen: Abdomen distended, mildly tender. Incisions c/d/i. Bulb serosanguinous.      Data   Recent Labs   Lab 05/11/20  0530 05/09/20  0909 05/08/20  1043   WBC  --  13.5* 11.5*   HGB  --  13.1 14.5   MCV  --  101* 101*   PLT  --  128* 143*    132* 136   POTASSIUM 3.9 4.5 4.3   CHLORIDE 108 102 102   CO2 26 25 29   BUN 12 20 28   CR 1.05* 1.12* 1.13*   ANIONGAP 4 5 5   LEONARD 8.5 8.9 10.1   * 158* 112*   ALBUMIN  --   --  4.6   PROTTOTAL  --   --  7.9   BILITOTAL  --   --  0.6   ALKPHOS  --   --  71   ALT  --   --  18   AST  --   --  19         Shikha Lozano PA-C      Lives independently with limited assisted living help.  Not  ready for transfer today due to limited po intake and urinary retention issues.  Trial of voiding possible tomorrow if she appears close to discharge.  Would also pull drain before discharge.  Lucinda Hart MD

## 2020-05-11 NOTE — PROGRESS NOTES
[facility]    Hospitalist Progress Note      Assessment & Plan   Dayana Samano is a 89 year old female with a history of HTN, HFpEF, mitral regurgitation, prior CVA, CKD, who was admitted on 5/8/2020 after acute RLQ abdominal pain for the past 4 days associated with nausea and anorexia.  CT abdomen and pelvis obtained in the ED revealed acute appendicitis with a small developing nazia-appendiceal abscess, lipase of 566. She was admitted by General Surgery for operative management.     #Acute Appendicitis with perforation and abscess s/p laproscopic Appendectomy with Dr. Kent on 5/8/2020:   Post operative course complicated by urinary retention, post operative ileus. Pain controlled, less nauseated today.   Will defer diet, activity, DVT prophylaxis, and pain control to the primary team. Currently the patient is on IV Zosyn. Continue incentive spirometry.  - indwelling for urinary retention, once distention improved and diet advancing could consider voiding trial   - fluids discontinued, po intake improved     #Hypoxia  Oxygen saturations 91-92% on room air. Slight increase of breathing work over the past two days. Likely atelectasis with diminished breath sounds but does have some Left basilar crackles.   - resume PTA lasix   - please encourage IS  - consider CXR if failed improvement in symptoms/work of breathing after lasix, no cough or fever    #Mitral Regurgitation  #HFpEF  Hypervolemic by physical exam, slight dyspnea and decreased oxygen saturations.   TTE last updated 2017 showing LVEF ~68%, Grade 2 LV DD, poor visualization of mitral valve anatomy. Not on oral diuretics PTA.    - resume PTA metoprolol with hold parameters  - resume PTA lasix, monitor for UO  - strict I's and O's  - if UO not increased may increase PTA lasix dose       #Elevated Blood Glucose  Initially elevated post operatively, suspect secondary to stress response. A1C 5.4.  - f/up with pcp     #CKD  Creatinine 1.2 near  "recent baseline, ~1.12 on admission.  - avoid nephrotoxic agents as able, NSAID's  - update BMP      #HTN  Blood pressures controlled.    - Continue Metoprolol BID and Lisinopril           DVT Prophylaxis: PCD's per surgical team, increase ambulation, apparently SBA so no PT needs   Code Status: DNR/DNI  Expected discharge: plans to discharge to home, lives at [facility]     Melba Eugenio, MARY  Text Page (7am - 5pm, M-F)    Interval History    Patient has improved abdominal discomfort. No emesis, nausea improved. BM x 2 overnight. Tolerated clear liquid diet. No fever. No cough.   Patient feels that breathing is more difficult for her today, \"to take breaths in\".  No shortness of breath at rest.  No cough, no fever.      -Data reviewed today: I reviewed all new labs and imaging results over the last 24 hours.     Physical Exam   Temp: 97.5  F (36.4  C) Temp src: Oral BP: (!) 152/56 Pulse: 63 Heart Rate: 74 Resp: 16 SpO2: 91 % O2 Device: None (Room air)    Vitals:    05/08/20 1020   Weight: 55.3 kg (122 lb)     Vital Signs with Ranges  Temp:  [96.7  F (35.9  C)-97.9  F (36.6  C)] 97.5  F (36.4  C)  Pulse:  [63-74] 63  Heart Rate:  [67-74] 74  Resp:  [16-18] 16  BP: (124-157)/(47-71) 152/56  SpO2:  [90 %-93 %] 91 %  I/O last 3 completed shifts:  In: -   Out: 2080 [Urine:1950; Drains:130]      Constitutional: Awake, alert, no apparent distress  Respiratory:  No retractions. Lungs diminished at bases, slight basilar crackles L>R. No wheezing.    Cardiovascular: Regular rate and rhythm, normal S1 and S2, soft systolic murmur appreciated.   GI: Normal bowel sounds, soft, slightly-distended, minimally tender to palpation. No rebound tenderness or guarding.   Skin/Integument: No rashes, no cyanosis, no peripheral edema.  Neuro: Moving all extremities with normal strength. Coordination and sensation grossly intact. Speech clear. No focal deficits.   Psych: Appropriate affect.          Medications     dextrose 5% and 0.45% " NaCl + KCl 20 mEq/L 75 mL/hr at 05/10/20 0530     No Medication Sleep Aids for this Patient         acetaminophen  975 mg Oral Q8H     latanoprost  1 drop Both Eyes At Bedtime     lisinopril  40 mg Oral Daily     metoprolol tartrate  100 mg Oral BID     piperacillin-tazobactam  2.25 g Intravenous Q6H     timolol maleate  1 drop Both Eyes QAM       Data   Recent Labs   Lab 05/11/20  0530 05/09/20  0909 05/08/20  1043   WBC  --  13.5* 11.5*   HGB  --  13.1 14.5   MCV  --  101* 101*   PLT  --  128* 143*    132* 136   POTASSIUM 3.9 4.5 4.3   CHLORIDE 108 102 102   CO2 26 25 29   BUN 12 20 28   CR 1.05* 1.12* 1.13*   ANIONGAP 4 5 5   LEONARD 8.5 8.9 10.1   * 158* 112*   ALBUMIN  --   --  4.6   PROTTOTAL  --   --  7.9   BILITOTAL  --   --  0.6   ALKPHOS  --   --  71   ALT  --   --  18   AST  --   --  19   LIPASE  --   --  566*       No results found for this or any previous visit (from the past 24 hour(s)).

## 2020-05-11 NOTE — PLAN OF CARE
/56  Pulse 63   Temp 97.5  F (Oral)  Resp 16  SpO2 91%       Orientation: A&Ox4  Neuro: Intact  Peripheral edema: None  Resp: LS dim. Some SERVIN noted. Lasix resumed today. IS encouraged.   Cardiac: WDL  GI: Bowel sounds active, one small stool this shift, denies nausea  : Martinez in place w/ AUO  Skin: Incision sites CDI with steri strips, SHELDON in place with 40mL output  Pain status: Controlled with scheduled tylenol. Reports no pain at rest, slight with movement.  Activity: SBA with GB & walker  LDA: PIV, SHELDON Drain  Infusions: Now SL between Zosyn  Diet: Tolerating Clears - no order to advance. Boost Breeze between meals.  Consults: Surgery following  Discharge Plan: Pending medical clearance

## 2020-05-11 NOTE — PROGRESS NOTES
CTS    [facility] from Leonie[facility] [phone]. Facility provided weekly showers and meals assistance. Patient was independent with ALD's. If returning to [facility], Facility requires patient returns before 1 pm as they have limited nursing staff and will need nursing staff available to complete change of condition assessment when she returns.    CTS will continue to follow for discharge.     Shereen Wallace RN BSN PHN CCM   Inpatient Care Coordination   [facility]   [phone]

## 2020-05-12 LAB
ANION GAP SERPL CALCULATED.3IONS-SCNC: 4 MMOL/L (ref 3–14)
BUN SERPL-MCNC: 12 MG/DL (ref 7–30)
CALCIUM SERPL-MCNC: 8.8 MG/DL (ref 8.5–10.1)
CHLORIDE SERPL-SCNC: 105 MMOL/L (ref 94–109)
CO2 SERPL-SCNC: 28 MMOL/L (ref 20–32)
COPATH REPORT: NORMAL
CREAT SERPL-MCNC: 0.9 MG/DL (ref 0.52–1.04)
ERYTHROCYTE [DISTWIDTH] IN BLOOD BY AUTOMATED COUNT: 12.9 % (ref 10–15)
GFR SERPL CREATININE-BSD FRML MDRD: 54 ML/MIN/{1.73_M2}
GLUCOSE SERPL-MCNC: 98 MG/DL (ref 70–99)
HCT VFR BLD AUTO: 38.6 % (ref 35–47)
HGB BLD-MCNC: 12.5 G/DL (ref 11.7–15.7)
MCH RBC QN AUTO: 32.6 PG (ref 26.5–33)
MCHC RBC AUTO-ENTMCNC: 32.4 G/DL (ref 31.5–36.5)
MCV RBC AUTO: 101 FL (ref 78–100)
PLATELET # BLD AUTO: 131 10E9/L (ref 150–450)
POTASSIUM SERPL-SCNC: 3.5 MMOL/L (ref 3.4–5.3)
RBC # BLD AUTO: 3.84 10E12/L (ref 3.8–5.2)
SODIUM SERPL-SCNC: 137 MMOL/L (ref 133–144)
WBC # BLD AUTO: 5.4 10E9/L (ref 4–11)

## 2020-05-12 PROCEDURE — 85027 COMPLETE CBC AUTOMATED: CPT | Performed by: PHYSICIAN ASSISTANT

## 2020-05-12 PROCEDURE — 99233 SBSQ HOSP IP/OBS HIGH 50: CPT | Performed by: PHYSICIAN ASSISTANT

## 2020-05-12 PROCEDURE — 36415 COLL VENOUS BLD VENIPUNCTURE: CPT | Performed by: PHYSICIAN ASSISTANT

## 2020-05-12 PROCEDURE — 25000132 ZZH RX MED GY IP 250 OP 250 PS 637: Mod: GY | Performed by: PHYSICIAN ASSISTANT

## 2020-05-12 PROCEDURE — 12000011 ZZH R&B MS OVERFLOW

## 2020-05-12 PROCEDURE — 25000128 H RX IP 250 OP 636: Performed by: SURGERY

## 2020-05-12 PROCEDURE — 80048 BASIC METABOLIC PNL TOTAL CA: CPT | Performed by: PHYSICIAN ASSISTANT

## 2020-05-12 RX ADMIN — Medication 1 SPRAY: at 07:50

## 2020-05-12 RX ADMIN — LATANOPROST 1 DROP: 50 SOLUTION/ DROPS OPHTHALMIC at 19:17

## 2020-05-12 RX ADMIN — TAZOBACTAM SODIUM AND PIPERACILLIN SODIUM 2.25 G: 250; 2 INJECTION, SOLUTION INTRAVENOUS at 11:56

## 2020-05-12 RX ADMIN — FUROSEMIDE 40 MG: 40 TABLET ORAL at 07:44

## 2020-05-12 RX ADMIN — LISINOPRIL 40 MG: 40 TABLET ORAL at 07:44

## 2020-05-12 RX ADMIN — TAZOBACTAM SODIUM AND PIPERACILLIN SODIUM 2.25 G: 250; 2 INJECTION, SOLUTION INTRAVENOUS at 18:10

## 2020-05-12 RX ADMIN — TAZOBACTAM SODIUM AND PIPERACILLIN SODIUM 2.25 G: 250; 2 INJECTION, SOLUTION INTRAVENOUS at 06:13

## 2020-05-12 RX ADMIN — METOPROLOL TARTRATE 100 MG: 50 TABLET, FILM COATED ORAL at 07:44

## 2020-05-12 RX ADMIN — TIMOLOL MALEATE 1 DROP: 5 SOLUTION/ DROPS OPHTHALMIC at 07:41

## 2020-05-12 RX ADMIN — METOPROLOL TARTRATE 100 MG: 50 TABLET, FILM COATED ORAL at 19:15

## 2020-05-12 NOTE — PROGRESS NOTES
[facility]    Hospitalist Progress Note      Assessment & Plan   Dayana Samano is a 89 year old female with a history of HTN, HFpEF, mitral regurgitation, prior CVA, CKD, who was admitted on 5/8/2020 after acute RLQ abdominal pain for the past 4 days associated with nausea and anorexia.  CT abdomen and pelvis obtained in the ED revealed acute appendicitis with a small developing nazia-appendiceal abscess, lipase of 566. She was admitted by General Surgery for operative management.     #Acute Appendicitis with perforation and abscess s/p laproscopic Appendectomy with Dr. Kent on 5/8/2020:   Post operative course complicated by urinary retention, post operative ileus. Ileus improving.   Will defer diet, activity, DVT prophylaxis, and pain control to the primary team. Currently the patient is on IV Zosyn. Continue incentive spirometry.  - discontinue indwelling for urinary retention, voiding trial  - fluids discontinued, po intake improved      #Hypoxia  Oxygen saturations 91-93% on room air. Slight increase of breathing work over the past two days. Suspected atelectasis with diminished breath sounds post op and poor inspiratory effort.  - resume PTA lasix   - please encourage IS  - no supplemental oxygen indicated at this time      #Mitral Regurgitation  #HFpEF  Dyspnea, volume status improved after re introduction of PTA lasix. No edema, orthopnea. Down -1940 ml.   TTE last updated 2017 showing LVEF ~68%, Grade 2 LV DD, poor visualization of mitral valve anatomy. Not on oral diuretics PTA.    - resume PTA metoprolol with hold parameters  - resume PTA lasix, monitor for UO  - strict I's and O's     #Elevated Blood Glucose  Initially elevated post operatively, suspect secondary to stress response. A1C 5.4.  - f/up with pcp      #CKD  Creatinine 0.9, near recent baseline ~1.12 on admission.  - avoid nephrotoxic agents as able, NSAID's       #HTN  Blood pressures controlled.    - Continue Metoprolol BID  and Lisinopril         DVT Prophylaxis: PCD's per surgical team, increase ambulation, apparently SBA so no PT needs   Code Status: DNR/DNI  Expected discharge: plans to discharge to home, lives at [facility] Reportedly weekly showers/meal assistance.       Melba Alvarado PA-C  Text Page (7am - 5pm, M-F)    Interval History   Patient feels dyspnea has improved. No emesis, nausea resolved. Loose BM x 2 over PM shift. Tolerated applesauce. No fever. No cough.    No shortness of breath at rest.  No cough, no fever.        -Data reviewed today: I reviewed all new labs and imaging results over the last 24 hours.     Physical Exam   Temp: 97.8  F (36.6  C) Temp src: Oral BP: (!) 149/55 Pulse: 63 Heart Rate: 70 Resp: 18 SpO2: 90 % O2 Device: None (Room air)    Vitals:    05/08/20 1020   Weight: 55.3 kg (122 lb)     Vital Signs with Ranges  Temp:  [96  F (35.6  C)-97.8  F (36.6  C)] 97.8  F (36.6  C)  Pulse:  [63-72] 63  Heart Rate:  [67-71] 70  Resp:  [16-18] 18  BP: (144-161)/(49-71) 149/55  SpO2:  [90 %-93 %] 90 %  I/O last 3 completed shifts:  In: -   Out: 1590 [Urine:1550; Drains:40]        Constitutional: Awake, alert, no apparent distress  Respiratory:  No retractions. Lungs diminished at bases. No wheezing.    Cardiovascular: Regular rate and rhythm, normal S1 and S2, soft systolic murmur appreciated.   GI: Normal bowel sounds, soft, non distended, minimally tender to palpation. No rebound tenderness or guarding.   Skin/Integument: No rashes, no cyanosis, no peripheral edema.  Neuro: Moving all extremities with normal strength. Coordination and sensation grossly intact. Speech clear. No focal deficits.   Psych: Appropriate affect.      Medications     No Medication Sleep Aids for this Patient         furosemide  40 mg Oral Daily     latanoprost  1 drop Both Eyes At Bedtime     lisinopril  40 mg Oral Daily     metoprolol tartrate  100 mg Oral BID     piperacillin-tazobactam  2.25 g Intravenous Q6H     timolol  maleate  1 drop Both Eyes QAM       Data   Recent Labs   Lab 05/12/20  0517 05/11/20  0530 05/09/20  0909 05/08/20  1043   WBC 5.4  --  13.5* 11.5*   HGB 12.5  --  13.1 14.5   *  --  101* 101*   *  --  128* 143*    138 132* 136   POTASSIUM 3.5 3.9 4.5 4.3   CHLORIDE 105 108 102 102   CO2 28 26 25 29   BUN 12 12 20 28   CR 0.90 1.05* 1.12* 1.13*   ANIONGAP 4 4 5 5   LEONARD 8.8 8.5 8.9 10.1   GLC 98 113* 158* 112*   ALBUMIN  --   --   --  4.6   PROTTOTAL  --   --   --  7.9   BILITOTAL  --   --   --  0.6   ALKPHOS  --   --   --  71   ALT  --   --   --  18   AST  --   --   --  19   LIPASE  --   --   --  566*       No results found for this or any previous visit (from the past 24 hour(s)).

## 2020-05-12 NOTE — PLAN OF CARE
Neuro: Alert and oriented, anxious at times.   Cardiac: WNL  Lungs: diminished, expiratory wheeze. Lasix started for crackles in lungs.   GI: Patient had 2 large loose stools this shift. Dr. Hart aware. No orders to collect stool.   : Mccord intact. Pending voiding trial in the AM   Skin: abdominal incisions. SHELDON drain.   Pain: denies pain   Meds: with applesauce   Labs/tests:   Diet: low appetite. Tolerating clears.   Activity: 1 assist with walker and gait belt   Misc: Encourage IS. Patient 91% RA.   Plan: Unsure of discharge. Patient recovering from post op retention (mccord still in place) and post op ileus (clear liquid diet tolerating) Patient also started having diarrhea last night.

## 2020-05-12 NOTE — PROGRESS NOTES
[facility]    General Surgery  Daily Post-Op Note       Assessment and Plan:   -Perforated appendicitis with abscess  -Leukocytosis on admission due to above; resolved  -S/P Procedure: Laparoscopic appendectomy, abscess debridement, drain placement; pathology: pending  -Afebrile  -Postop Ileus as expected, starting to improve  -Urinary retention, mccord placed 5/11, removed 5/12 for voiding trial    Pain: tylenol, oxycodone available   Diet: full liquids. Encouraged protein drink.    Activity: up ad glenn  Antibiotics: zosyn   DVT prophylaxis: PCDs   Await results of voiding trial  SHELDON - remove prior to discharge  Appreciate hospitalist assistance  Dispo: pending resolution of ileus/bloating, tolerance of diet intake, resumes voiding.        Interval History:   Comfortable sitting in chair.  Recent removal of mccord, no void yet.  No fever/chills/sweats.  No nausea, but afraid of getting nausea.  Tolerating clears, but didn't like the clear protein drink.  +flatus and BMs.  Not feeling bloated.  Discussed increase of diet to full liquids, but not to push intake; follow appetite.  Decrease or hold intake if nausea/distress.  Continue activity as able.  Await void.         Physical Exam:   Temp: 97.8  F (36.6  C) Temp src: Oral BP: (!) 149/55 Pulse: 63 Heart Rate: 70 Resp: 18 SpO2: 90 % O2 Device: None (Room air)      I/O last 3 completed shifts:  In: -   Out: 1590 [Urine:1550; Drains:40]    Constitutional: healthy, alert and no distress   Respiratory: negative  Abdomen: Abdomen distended, mildly tender. Incisions c/d/i.   Drain in place, lower left abd: bulb serosanguinous.    Data   Recent Labs   Lab 05/12/20  0517 05/11/20  0530 05/09/20  0909 05/08/20  1043   WBC 5.4  --  13.5* 11.5*   HGB 12.5  --  13.1 14.5   *  --  101* 101*   *  --  128* 143*    138 132* 136   POTASSIUM 3.5 3.9 4.5 4.3   CHLORIDE 105 108 102 102   CO2 28 26 25 29   BUN 12 12 20 28   CR 0.90 1.05* 1.12* 1.13*    ANIONGAP 4 4 5 5   LEONARD 8.8 8.5 8.9 10.1   GLC 98 113* 158* 112*   ALBUMIN  --   --   --  4.6   PROTTOTAL  --   --   --  7.9   BILITOTAL  --   --   --  0.6   ALKPHOS  --   --   --  71   ALT  --   --   --  18   AST  --   --   --  19     Jyoti Livingston PA-C    The patient feels that her bloating is getting a bit better.  She is hungry for the first time since surgery.  We will see how she does with a diet.  When she is able to tolerate a diet, we should be able to look at discharge.  This could conceivably be in the next 1 to 2 days.    Spenser Kent MD  [facility]

## 2020-05-12 NOTE — PLAN OF CARE
A&Ox4. RA. Slight dyspnea w/ exertion. LS diminished w/ occasional expiratory wheezes. Resting between cares. Dressing and incisional sites WDL, some dried drainage present. SHELDON drain patent. Up w/ SBA, gait belt and walker. BS active x4, passing flatus. Transitioned from CL to Comb diet/Full liquid. Patient has only tried Boost so far, tolerated well. BM x 1 today, loose. Incontinent of bowel and bladder at times. Indwelling Martinez catheter removed this morning at ~10:00 AM, patient has been voiding in adequate amt's. Denies pain. Discharge plans pending, likely to discharge tomorrow or Thursday. Continues IV Unasyn, SL between. Sister (Maryam) and daughter (Marion) updated via phone today. Will continue to monitor.

## 2020-05-12 NOTE — PLAN OF CARE
BP (!) 161/57 (BP Location: Right arm)   Pulse 63   Temp 96  F (35.6  C) (Oral)   Resp 18   Ht 1.524 m (5')   Wt 55.3 kg (122 lb)   SpO2 93%   BMI 23.83 kg/m      Admit Date: 05/08/2020    Admitting Diagnosis: Acute appendicitis with perforation    Pertinent History: HTN, CKD, CVA    Isolation Precautions: No active isolations    Activity: assistance, 1 person    Pertinent Labs: CR slightly elevated 1.05    Assessment: Alert and oriented x 4. Lung sounds slightly diminished. Reports some difficulty with taking deep breaths. O2 93% room air. SHELDON had 20 mL output this shift.    LDA's: Peripheral, SHELDON and Martinez    Diet: Clear Liquid Diet  Snacks/Supplements Adult: Boost Breeze; Between Meals    Living Situation: [facility]    Consults: Social Work or Care Coordination    Discharge Disposition: Assisted living Facility

## 2020-05-13 VITALS
SYSTOLIC BLOOD PRESSURE: 99 MMHG | RESPIRATION RATE: 16 BRPM | OXYGEN SATURATION: 94 % | WEIGHT: 122.1 LBS | HEART RATE: 76 BPM | BODY MASS INDEX: 23.97 KG/M2 | HEIGHT: 60 IN | TEMPERATURE: 97.6 F | DIASTOLIC BLOOD PRESSURE: 47 MMHG

## 2020-05-13 LAB
ANION GAP SERPL CALCULATED.3IONS-SCNC: 5 MMOL/L (ref 3–14)
BUN SERPL-MCNC: 17 MG/DL (ref 7–30)
CALCIUM SERPL-MCNC: 9 MG/DL (ref 8.5–10.1)
CHLORIDE SERPL-SCNC: 104 MMOL/L (ref 94–109)
CO2 SERPL-SCNC: 29 MMOL/L (ref 20–32)
CREAT SERPL-MCNC: 0.97 MG/DL (ref 0.52–1.04)
ERYTHROCYTE [DISTWIDTH] IN BLOOD BY AUTOMATED COUNT: 13.2 % (ref 10–15)
GFR SERPL CREATININE-BSD FRML MDRD: 49 ML/MIN/{1.73_M2}
GLUCOSE SERPL-MCNC: 104 MG/DL (ref 70–99)
HCT VFR BLD AUTO: 38.3 % (ref 35–47)
HGB BLD-MCNC: 12.3 G/DL (ref 11.7–15.7)
MCH RBC QN AUTO: 32.4 PG (ref 26.5–33)
MCHC RBC AUTO-ENTMCNC: 32.1 G/DL (ref 31.5–36.5)
MCV RBC AUTO: 101 FL (ref 78–100)
PLATELET # BLD AUTO: 131 10E9/L (ref 150–450)
POTASSIUM SERPL-SCNC: 3.1 MMOL/L (ref 3.4–5.3)
RBC # BLD AUTO: 3.8 10E12/L (ref 3.8–5.2)
SODIUM SERPL-SCNC: 138 MMOL/L (ref 133–144)
WBC # BLD AUTO: 6.2 10E9/L (ref 4–11)

## 2020-05-13 PROCEDURE — 80048 BASIC METABOLIC PNL TOTAL CA: CPT | Performed by: PHYSICIAN ASSISTANT

## 2020-05-13 PROCEDURE — 25000128 H RX IP 250 OP 636: Performed by: SURGERY

## 2020-05-13 PROCEDURE — 25000132 ZZH RX MED GY IP 250 OP 250 PS 637: Mod: GY | Performed by: PHYSICIAN ASSISTANT

## 2020-05-13 PROCEDURE — 85027 COMPLETE CBC AUTOMATED: CPT | Performed by: PHYSICIAN ASSISTANT

## 2020-05-13 PROCEDURE — 36415 COLL VENOUS BLD VENIPUNCTURE: CPT | Performed by: PHYSICIAN ASSISTANT

## 2020-05-13 PROCEDURE — 99239 HOSP IP/OBS DSCHRG MGMT >30: CPT | Performed by: PHYSICIAN ASSISTANT

## 2020-05-13 RX ORDER — POTASSIUM CHLORIDE 1500 MG/1
20 TABLET, EXTENDED RELEASE ORAL DAILY
Status: DISCONTINUED | OUTPATIENT
Start: 2020-05-13 | End: 2020-05-13 | Stop reason: HOSPADM

## 2020-05-13 RX ORDER — AMOXICILLIN AND CLAVULANATE POTASSIUM 500; 125 MG/1; MG/1
1 TABLET, FILM COATED ORAL 2 TIMES DAILY
Qty: 6 TABLET | Refills: 0 | Status: SHIPPED | OUTPATIENT
Start: 2020-05-13 | End: 2020-05-29

## 2020-05-13 RX ORDER — DEXTROSE MONOHYDRATE, SODIUM CHLORIDE, AND POTASSIUM CHLORIDE 50; 1.49; 4.5 G/1000ML; G/1000ML; G/1000ML
INJECTION, SOLUTION INTRAVENOUS CONTINUOUS
Status: DISCONTINUED | OUTPATIENT
Start: 2020-05-13 | End: 2020-05-13

## 2020-05-13 RX ADMIN — TAZOBACTAM SODIUM AND PIPERACILLIN SODIUM 2.25 G: 250; 2 INJECTION, SOLUTION INTRAVENOUS at 06:04

## 2020-05-13 RX ADMIN — TIMOLOL MALEATE 1 DROP: 5 SOLUTION/ DROPS OPHTHALMIC at 09:40

## 2020-05-13 RX ADMIN — TAZOBACTAM SODIUM AND PIPERACILLIN SODIUM 2.25 G: 250; 2 INJECTION, SOLUTION INTRAVENOUS at 00:00

## 2020-05-13 RX ADMIN — Medication 1 SPRAY: at 04:25

## 2020-05-13 RX ADMIN — METOPROLOL TARTRATE 100 MG: 50 TABLET, FILM COATED ORAL at 09:40

## 2020-05-13 RX ADMIN — LISINOPRIL 40 MG: 40 TABLET ORAL at 09:40

## 2020-05-13 RX ADMIN — FUROSEMIDE 40 MG: 40 TABLET ORAL at 09:40

## 2020-05-13 RX ADMIN — POTASSIUM CHLORIDE 20 MEQ: 1500 TABLET, EXTENDED RELEASE ORAL at 09:40

## 2020-05-13 ASSESSMENT — MIFFLIN-ST. JEOR: SCORE: 870.34

## 2020-05-13 NOTE — DISCHARGE SUMMARY
Discharge Planner      Discharge Plans in progress: Patient will be returning to her assisted living today. Met with patient, she is comfortable going home as she will be discussing increasing her services at the assisted living.    Barriers to discharge plan:Faye no barriers    Follow up plan: Daughter, Marion will transport patient back to her assisted living @ 1230. Orders have been faxed to [facility] at [phone].    Marion Mehta [facility] THIAGO   Inpatient Care Coordination   Supervisor  [facility]  [phone]         Entered by: Marion Mehta 05/13/2020 10:43 AM

## 2020-05-13 NOTE — PLAN OF CARE
BP (!) 141/55 (BP Location: Right arm)   Pulse 76   Temp 96.9  F (36.1  C) (Oral)   Resp 16   Ht 1.524 m (5')   Wt 55.3 kg (122 lb)   SpO2 94%   BMI 23.83 kg/m      Neuro: WDL  Cardiac: WDL  Lungs: Diminished at bases. Encouraged IS at the bedside.  GI: BS active. Passing flatus. Last BM 5/12; loose.  : WDL  Skin: Lap sites to abdomen WDL except ecchymotic. SHELDON drain in place with serous output. Small shadowing of drainage to dressing.  Pain: Denies pain  IV: PIV SL  Meds: IV Zosyn q6h  Labs/tests: Recheck in AM  Diet: Full liquids, 6 small meals, boost  Activity: x1, gait belt, and walker  Disposition: [facility]  Plan: IV abx, tolerate diet, supportive care.

## 2020-05-13 NOTE — PLAN OF CARE
Patient's After Visit Summary was reviewed with patient and/or daughter (over the phone).   Patient verbalized understanding of After Visit Summary, recommended follow up and was given an opportunity to ask questions.   Discharge medications sent home with patient/family: YES, Augmentin.   Discharged with daughter.    Discharged in stable condition with daughter as ride. Copy of AVS sent with patient for daughter per patient request. AVS and instructions for follow up thoroughly reviewed with patient.     OBSERVATION patient END time: 12:31 PM

## 2020-05-13 NOTE — PLAN OF CARE
BP 99/47  Pulse 76   Temp 97.6  F (Oral)  Resp 16  SpO2 94%      Orientation: A&Ox4  Neuro: Intact  Peripheral edema: None  Resp: LS dim. Slight SERVIN noted. IS encouraged.   Cardiac: WDL  GI: Bowel sounds active, one loose stool this shift, denies nausea  : Voiding spontaneously without difficulty  Skin: Incision sites CDI with steri strips, SHELDON removed by surgery - dressing C/D/I.   Pain status: Reports no pain at rest, slight with movement. Declined intervention.  Activity: SBA with GB & walker  LDA: PIV removed before discharge.  Diet: Tolerating Fulls w/ Boost between meals  Consults: Surgery following  Discharge Plan: Back to [facility] today with increased services. Abx being filled at discharge pharmacy. Daughter, Marion to transport.

## 2020-05-13 NOTE — PROGRESS NOTES
[facility]  General Surgery Daily Post-Op Note         Assessment and Plan:   -POD#5 S/P Laparoscopic appendectomy, abscess debridement, drain placement  -Postop Ileus as expected - resolved  -Urinary retention resolved  -Pain: tylenol, oxycodone available prn   -Diet: regulalr   -Activity: up ad glenn  -Antibiotics: zosyn   -DVT prophylaxis: PCDs   -Appreciate hospitalist assistance  -Dispo: Plan to discharge today with home health. Rx: Augmentin x3 days. Discharge instructions in chart.        Interval History:   Sitting up in chair, comfortable. Pain controlled with tylenol. Has had several BMs. Now tolerating diet. Up in room with assistance. Voiding independently.          Physical Exam:   Temp: 96.6  F (35.9  C) Temp src: Oral BP: 134/51 Pulse: 76 Heart Rate: 67 Resp: 16 SpO2: 93 % O2 Device: None (Room air)      I/O last 3 completed shifts:  In: 240 [P.O.:240]  Out: 182 [Urine:150; Drains:32]    Constitutional: healthy, alert and no distress   Abdomen: Abdomen soft, non-distended, mildly tender. Incisions c/d/i with steristrips  Drain in place, lower left abd: bulb serosanguinous, removed without complications.    Data   Recent Labs   Lab 05/13/20  0532 05/12/20  0517 05/11/20  0530 05/09/20  0909 05/08/20  1043   WBC 6.2 5.4  --  13.5* 11.5*   HGB 12.3 12.5  --  13.1 14.5   * 101*  --  101* 101*   * 131*  --  128* 143*    137 138 132* 136   POTASSIUM 3.1* 3.5 3.9 4.5 4.3   CHLORIDE 104 105 108 102 102   CO2 29 28 26 25 29   BUN 17 12 12 20 28   CR 0.97 0.90 1.05* 1.12* 1.13*   ANIONGAP 5 4 4 5 5   LEONARD 9.0 8.8 8.5 8.9 10.1   * 98 113* 158* 112*   ALBUMIN  --   --   --   --  4.6   PROTTOTAL  --   --   --   --  7.9   BILITOTAL  --   --   --   --  0.6   ALKPHOS  --   --   --   --  71   ALT  --   --   --   --  18   AST  --   --   --   --  19     Preet Huerta PA-C      Discussed with PA did not personally see today. Lissett Lynne MD

## 2020-05-13 NOTE — DISCHARGE SUMMARY
[facility]    Discharge Summary  Hospitalist    Date of Admission:  5/8/2020  Date of Discharge:  5/13/2020 12:33 PM  Discharging Provider: Melba Alvarado PA-C  Date of Service (when I saw the patient): 5/13/20    Discharge Diagnoses   Acute appendicitis  HFpEF  Elevated blood glucose      History of Present Illness   Dayana Samano is a 89 year old female with a history of HTN, HFpEF, mitral regurgitation, prior CVA, CKD, who was admitted on 5/8/2020 after acute RLQ abdominal pain for the previous 4 days associated with nausea and anorexia.  CT abdomen and pelvis obtained in the ED revealed acute appendicitis with a small developing nazia-appendiceal abscess, lipase of 566. She was admitted by General Surgery for operative management and underwent appendectomy on 5/8/2020.     Please see admitting H & P for full details of the encounter.     Hospital Course   Dayana Samano was admitted on 5/8/2020.  The following problems were addressed during her hospitalization:    #Acute Appendicitis with perforation and abscess s/p laproscopic Appendectomy on 5/8/2020:   Post operative course complicated by urinary retention, post operative ileus, both of which resolved.  - IV Zosyn discharge 5/13. Discharged on 3 day course of Augmentin, renal dosage        #Mitral Regurgitation  #HFpEF  - resume PTA metoprolol   - resume PTA lasix     #Elevated Blood Glucose  Initially elevated post operatively, suspect secondary to stress response. A1C 5.4.  - f/up with pcp      #CKD  Creatinine 0.9, near recent baseline ~1.12 on admission.  - avoid nephrotoxic agents as able, NSAID's    Pending Results   None.    Code Status   DNR / DNI       Primary Care Physician   Marion Alonso MD      INTERVAL HISTORY  Patient is feeling well. Abdominal bloating improved. Tolerated dietary advancements. No nausea, emesis. Voiding spontaneously after urinary catheter removal. Breathing feels at patient's baseline. No chest pain or dyspnea.  Ambulating with nursing staff stand by assist. Afebrile, no cough.       BP 99/47 (BP Location: Right arm)   Pulse 76   Temp 97.6  F (36.4  C) (Oral)   Resp 16   Ht 1.524 m (5')   Wt 55.4 kg (122 lb 1.6 oz)   SpO2 94%   BMI 23.85 kg/m      Constitutional: Awake, alert, no apparent distress  Respiratory:  Normal work of breathing. Lungs clear to auscultation bilaterally, no crackles or wheezing.  Cardiovascular: Regular rate and rhythm, normal S1 and S2, soft systolic murmur appreciated.   GI: Bowel sounds present. soft, non-distended, slightly tender to palpation.   Skin/Integument: Warm, dry. no peripheral edema.  Neuro: No focal deficits. Moving all extremities with normal strength. Coordination and sensation grossly intact. Speech clear.   Psych: Appropriate affect.        Discharge Disposition   Discharged to assisted living with increased services.  Condition at discharge: Good    Consultations This Hospital Stay   HOSPITALIST IP CONSULT  SOCIAL WORK IP CONSULT    Time Spent on this Encounter   IMelba PA-C, personally saw the patient today and spent greater than 30 minutes discharging this patient.    Discharge Orders      Basic metabolic panel     Reason for your hospital stay    You were hospitalized for surgical management of appendicitis.     Follow-up and recommended labs and tests     Follow up with primary care provider, Marion Alonso MD, within 7-10 days to evaluate after surgery and for hospital follow- up.  The following labs/tests are recommended: BMP.  Follow up as directed with Surgical team.     Activity    Your activity upon discharge: activity as tolerated, call for assistance with transfers.     Discharge Instructions    Use tylenol, ice/heat for pain.  Continue to use the breathing machine (incentive spirometer)     When to contact your care team    Call your primary care doctor if you have any of the following: temperature greater than 101 F, worsening shortness of breath,  increased swelling, worsening pain, new or unrelenting diarrhea, or any other concerning symptoms. Call 911 or go to the emergency room if you need immediate assistance.     Diet    Follow this diet upon discharge: Orders Placed This Encounter      Snacks/Supplements Adult: Boost Shake; Between Meals      Combination Diet Six Small Feedings Adult, Regular Diet Adult     Discharge Medications   Current Discharge Medication List      START taking these medications    Details   amoxicillin-clavulanate (AUGMENTIN) 500-125 MG tablet Take 1 tablet by mouth 2 times daily for 3 days  Qty: 6 tablet, Refills: 0    Associated Diagnoses: Acute appendicitis with perforation, localized peritonitis, and abscess, unspecified whether gangrene present         CONTINUE these medications which have NOT CHANGED    Details   acetaminophen (TYLENOL) 500 MG tablet Take 1-2 tablets (500-1,000 mg) by mouth every 6 hours as needed for mild pain    Associated Diagnoses: Osteoarthritis, unspecified osteoarthritis type, unspecified site      aspirin (ASA) 325 MG tablet Take 325 mg by mouth daily      CALCIUM 600 + D 600-200 MG-UNIT OR TABS Take 1 tablet by mouth daily   Qty: 3 MONTHS, Refills: 1 YEAR      furosemide (LASIX) 40 MG tablet TAKE 1 TABLET DAILY  Qty: 90 tablet, Refills: 3    Associated Diagnoses: History of endocarditis      hypromellose (ARTIFICIAL TEARS) 0.5 % SOLN ophthalmic solution 1 drop every hour as needed for dry eyes      latanoprost (XALATAN) 0.005 % ophthalmic solution Place 1 drop into both eyes daily      lisinopril (PRINIVIL/ZESTRIL) 40 MG tablet Take 1 tablet (40 mg) by mouth daily  Qty: 90 tablet, Refills: 1    Associated Diagnoses: History of endocarditis; Essential hypertension, benign      metoprolol tartrate (LOPRESSOR) 100 MG tablet Take 1 tablet (100 mg) by mouth 2 times daily  Qty: 180 tablet, Refills: 1    Associated Diagnoses: History of endocarditis; Essential hypertension, benign      multivitamin  (OCUVITE) TABS tablet Take 1 tablet by mouth daily      potassium chloride ER (KLOR-CON) 20 MEQ CR tablet Take 1 tablet (20 mEq) by mouth daily  Qty: 90 tablet, Refills: 1    Associated Diagnoses: History of endocarditis      timolol maleate (TIMOPTIC) 0.5 % ophthalmic solution Place 1 drop into both eyes every evening            Allergies   Allergies   Allergen Reactions     No Known Allergies      Data   Most Recent 3 CBC's:  Recent Labs   Lab Test 05/13/20  0532 05/12/20  0517 05/09/20  0909   WBC 6.2 5.4 13.5*   HGB 12.3 12.5 13.1   * 101* 101*   * 131* 128*      Most Recent 3 BMP's:  Recent Labs   Lab Test 05/13/20  0532 05/12/20  0517 05/11/20  0530    137 138   POTASSIUM 3.1* 3.5 3.9   CHLORIDE 104 105 108   CO2 29 28 26   BUN 17 12 12   CR 0.97 0.90 1.05*   ANIONGAP 5 4 4   LEONARD 9.0 8.8 8.5   * 98 113*     Most Recent 2 LFT's:  Recent Labs   Lab Test 05/08/20  1043   AST 19   ALT 18   ALKPHOS 71   BILITOTAL 0.6       Most Recent TSH, T4 and A1c Labs:  Recent Labs   Lab Test 05/09/20  0909   A1C 5.4     Results for orders placed or performed during the hospital encounter of 05/08/20   CT Abdomen Pelvis w Contrast     Value    Radiologist flags Acute appendicitis (AA)    Narrative    CT ABDOMEN AND PELVIS WITH CONTRAST 5/8/2020 11:34 AM    CLINICAL HISTORY: Diffuse abdominal pain, right lower quadrant worse.    TECHNIQUE: CT scan of the abdomen and pelvis was performed following  injection of IV contrast. Multiplanar reformats were obtained. Dose  reduction techniques were used.  CONTRAST: 61 mL [id number].    COMPARISON: None.    FINDINGS:   LOWER CHEST: Normal.    HEPATOBILIARY: Benign-appearing low-density lesions in the left lobe  of the liver are likely cysts and require no further follow-up. There  is radiodense material in the dependent gallbladder, stones versus  sludge. The central biliary tree is prominent with the common bile  duct measuring up to 1.0 cm, and the pancreatic  duct is also dilated.  There appears to be a small duodenal diverticulum near the ampulla,  and this is likely related to chronic partial obstruction. No  worrisome focal liver lesions.    PANCREAS: Dilated pancreatic duct, otherwise unremarkable.    SPLEEN: Calcified granulomata.    ADRENAL GLANDS: Mild nonspecific thickening of the left adrenal gland.  Right adrenal gland normal.    KIDNEYS/BLADDER: No hydronephrosis or solid renal mass. Urinary  bladder unremarkable.    BOWEL: Dilated, inflamed-appearing appendix with an appendicolith. The  appendix measures up to 1.7 cm. Adjacent to the appendix, there is a  loculated low-density collection that measures 2.8 cm (series 3, image  33), likely a developing periappendiceal abscess. No free  intraperitoneal air. No bowel obstruction. There is sigmoid  diverticulosis without diverticulitis.    PELVIC ORGANS: Normal.    ADDITIONAL FINDINGS: No ascites. Nonaneurysmal aortic atherosclerosis.    MUSCULOSKELETAL: Prior right hip fixation. Multiple healed pelvic  fractures. No lytic or blastic lesions.      Impression    IMPRESSION:   1.  Acute appendicitis with small developing periappendiceal abscess.  2.  Cholelithiasis without CT evidence of acute cholecystitis.  3.  Chronic-appearing dilation of the central biliary tree and the  pancreatic duct. Associated with a small duodenal diverticulum.  Correlate with LFTs for evidence of chronic partial obstruction.    [Critical Result: Acute appendicitis]    Finding was identified on 5/8/2020 11:41 AM.     Dr. Abrams was contacted by me on 5/8/2020 11:48 AM and verbalized  understanding of the critical result.     MD Melba JORGENSEN PA-C  [facility]

## 2020-05-13 NOTE — DISCHARGE INSTRUCTIONS
HOME CARE FOLLOWING APPENDECTOMY  AGUSTIN Higgins, BEBETO Hermosillo R. O Donnell, J. Shaheen    INCISIONAL CARE:  Replace the bandage over your incision(s) until all drainage stops, or if more comfortable to have in place.  If present, leave the steri-strips (white paper tapes) in place for 14 days after surgery.  If Dermabond (a type of skin glue) is present, leave in place until it wears/flakes off (2-3 weeks).     BATHING:  OK to shower 48 hours after surgery.  Avoid baths for 1 week after surgery.  You may wash your hair at any time.  Gently pat your incision dry after bathing.  Do not apply lotions, creams, or ointments to incisions.    ACTIVITY:  Light Activity -- you may immediately be up and about as tolerated.  Walking is encouraged, increase as tolerated.  Driving/Light Work-- when comfortable and off narcotic pain medications.  Strenuous Work/Activity -- limit lifting to 20 pounds for 2 weeks.  Progressively increase with time.  Active Sports (running, biking, etc.) -- cautiously resume after 2 weeks.    DISCOMFORT:  Local anesthetic placed at surgery should provide relief for 4-8 hours.  Begin taking pain pills before discomfort is severe.  Take the pain medication with some food, when possible, to minimize side effects.  Intermittent use of ice packs may help during the first 1-3 weeks after surgery.  Expect gradual improvement.    Over-the-counter anti-inflammatory medications (i.e. Ibuprofen/Advil/Motrin or Naprosyn/Aleve) may be used per package instructions in addition to or while tapering off the narcotic pain medications to decrease swelling and sensitivity.  DO NOT TAKE these Anti-inflammatory medications if your primary physician has advised against doing so, or if you have acid reflux, ulcer, or bleeding disorder, or take blood-thinner medications.  Call your primary physician or the surgery office if you have medication questions.  You may have decreased energy level for 1-2  "weeks after surgery related to your recovery.    DIET:  Start with liquids and gradually resume your regular diet as tolerated.  Consider eating yogurt or taking a probiotic to help your \"gut carlton\" (good bacteria in the bowel/colon) return to normal while taking or after receiving antibiotics.  Drink plenty of fluids.  While taking pain medications, consider use of a stool softener, increase your fiber in your diet, or add a fiber supplement (like Metamucil, Citrucel) to help prevent constipation - a possible side effect of pain medications.    NAUSEA:  If nauseated from the anesthetic/pain meds; rest in bed, get up cautiously with assistance, and drink clear liquids (juice, tea, broth).    FOLLOW-UP AFTER SURGERY:  -Our office will contact you approximately 2-3 weeks after surgery to check on your progress and answer any questions you may have.  If you are doing well, you will not need to return for an office appointment.  If any concerns are identified over the phone, we will help you make an appointment to see a provider.    -If you have not received a phone call, have any questions or concerns, or would like to be seen, please call us at [phone].  We are located at: [address], [address]; [place] [address]    -CONTACT US IF THE FOLLOWING DEVELOPS:   1. A fever that is above 101     2. Increased redness, warmth, drainage, bleeding, or swelling.   3. Pain that is not relieved by rest/ice and your prescription.   4.  Increasing pain after 48 hours.   5. Drainage that is thick, cloudy, yellow, green or white.   6. Any other questions or concerns.      FREQUENTLY ASKED QUESTIONS:    Q:  How should my incision look?    A:  Normally your incision will appear slightly swollen with light redness directly along the incision itself as it heals.  It may feel like a bump or ridge as the healing/scarring happens, and over time (3-4 months) this bump or ridge feeling should slowly go away.  In general, clear " or pink watery drainage can be normal at first as your incision heals, but should decrease over time.    Q:  How do I know if my incision is infected?  A:  Look at your incision for signs of infection, like redness around the incision spreading to surrounding skin, or drainage of cloudy or foul-smelling drainage.  If you feel warm, check your temperature to see if you are running a fever.    **If any of these things occur, please notify the nurse at our office.  We may need you to come into the office for an incision check.      Q:  How do I take care of my incision?  A:  If you have a dressing in place - Starting the day after surgery, replace the dressing 1-2 times a day until there is no further drainage from the incision.  At that time, a dressing is no longer needed.  Try to minimize tape on the skin if irritation is occurring at the tape sites.  If you have significant irritation from tape on the skin, please call the office to discuss other method of dressing your incision.    Small pieces of tape called  steri-strips  may be present directly overlying your incision; these may be removed 10 days after surgery unless otherwise specified by your surgeon.  If these tapes start to loosen at the ends, you may trim them back until they fall off or are removed.    A:  If you had  Dermabond  tissue glue used as a dressing (this causes your incision to look shiny with a clear covering over it) - This type of dressing wears off with time and does not require more dressings over the top unless it is draining around the glue as it wears off.  Do not apply ointments or lotions over the incisions until the glue has completely worn off.    Q:  There is a piece of tape or a sticky  lead  still on my skin.  Can I remove this?  A:  Sometimes the sticky  leads  used for monitoring during surgery or for evaluation in the emergency department are not all removed while you are in the hospital.  These sometimes have a tab or metal  dot on them.  You can easily remove these on your own, like taking off a band-aid.  If there is a gel substance under the  lead , simply wipe/clean it off with a washcloth or paper towel.      Q:  What can I do to minimize constipation (very hard stools, or lack of stools)?  A:  Stay well hydrated.  Increase your dietary fiber intake or take a fiber supplement -with plenty of water.  Walk around frequently.  You may consider an over-the-counter stool-softener.  Your Pharmacist can assist you with choosing one that is stocked at your pharmacy.  Constipation is also one of the most common side effects of pain medication.  If you are using pain medication, be pro-active and try to PREVENT problems with constipation by taking the steps above BEFORE constipation becomes a problem.    Q:  What do I do if I need more pain medications?  A:  Call the office to receive refills.  Be aware that certain pain meds cannot be called into a pharmacy and actually require a paper prescription.  A change may be made in your pain med as you progress thru your recovery period or if you have side effects to certain meds.    --Pain meds are NOT refilled after 5pm on weekdays, and NOT AT ALL on the weekends, so please look ahead to prevent problems.    Q:  Why am I having a hard time sleeping now that I am at home?  A:  Many medications you receive while you are in the hospital can impact your sleep for a number of days after your surgery/hospitalization.  Decreased level of activity and naps during the day may also make sleeping at night difficult.  Try to minimize day-time naps, and get up frequently during the day to walk around your home during your recovery time.  Sleep aides may be of some help, but are not recommended for long-term use.      Q:  I am having some back discomfort.  What should I do?  A:  This may be related to certain positioning that was required for your surgery, extended periods of time in bed, or other changes in  your overall activity level.  You may try ice, heat, acetaminophen, or ibuprofen to treat this temporarily.  Note that many pain medications have acetaminophen in them and would state this on the prescription bottle.  Be sure not to exceed the maximum of 4000mg per day of acetaminophen.     **If the pain you are having does not resolve, is severe, or is a flare of back pain you have had on other occasions prior to surgery, please contact your primary physician for further recommendations or for an appointment to be examined at their office.    Q:  Why am I having headaches?  A:  Headaches can be caused by many things:  caffeine withdrawal, use of pain meds, dehydration, high blood pressure, lack of sleep, over-activity/exhaustion, flare-up of usual migraine headaches.  If you feel this is related to muscle tension (a band-like feeling around the head, or a pressure at the low-back of the head) you may try ice or heat to this area.  You may need to drink more fluids (try electrolyte drink like Gatorade), rest, or take your usual migraine medications.   **If your headaches do not resolve, worsen, are accompanied by other symptoms, or if your blood pressure is high, please call your primary physician for recommendation and/or examination.    Q:  I am unable to urinate.  What do I do?  A:  A small percentage of people can have difficulty urinating initially after surgery.  This includes being able to urinate only a very small amount at a time and feeling discomfort or pressure in the very low abdomen.  This is called  urinary retention , and is actually an urgent situation.  Proceed to your nearest Emergency department for evaluation (not an Urgent Care Center).  Sometimes the bladder does not work correctly after certain medications you receive during surgery, or related to certain procedures.  You may need to have a catheter placed until your bladder recovers.  When planning to go to an Emergency department, it may  help to call the ER to let them know you are coming in for this problem after a surgery.  This may help you get in quicker to be evaluated.  **If you have symptoms of a urinary tract infection, please contact your primary physician for the proper evaluation and treatment.        If you have other questions, please call the office Monday thru Friday between 8am and 5pm to discuss with the Nurse or Physician Assistant.  #[phone]    There is a surgeon ON CALL on weekday evenings and over the weekend in case of urgent need only, and may be contacted at the same number.    If you are having an emergency, call 911 or proceed to your nearest emergency department.

## 2020-05-13 NOTE — PLAN OF CARE
Neuro: A&O, can be anxious at times  Cardiac: WNL  Lungs: diminished. IS encouraged. Level on .   GI: Stool x1 on evenings. Loose stool  : WNL-  Skin: WNL-incision steri strips x2 and SHELDON site.   Pain: denies pain   Meds: whole with water .  Labs/tests:   Diet: tolerating full, low appetite   Activity: SBA with walker   Misc:   Plan: 24-48 more hours of IV ABX. discontinue to [place] before 1pm on day of discharge.

## 2020-05-14 ENCOUNTER — TELEPHONE (OUTPATIENT)
Dept: FAMILY MEDICINE | Facility: CLINIC | Age: 85
End: 2020-05-14

## 2020-05-14 ENCOUNTER — PATIENT OUTREACH (OUTPATIENT)
Dept: CARE COORDINATION | Facility: CLINIC | Age: 85
End: 2020-05-14

## 2020-05-14 DIAGNOSIS — K35.33 ACUTE APPENDICITIS WITH PERFORATION, LOCALIZED PERITONITIS, AND ABSCESS, UNSPECIFIED WHETHER GANGRENE PRESENT: Primary | ICD-10-CM

## 2020-05-14 NOTE — PROGRESS NOTES
Received voice message from nurse at [facility] from 5/13 1630 requesting labs from hospitalization specifically COVID results.    Labs and discharge summary faxed to Kimberly Andres 5/14 1040.    Casie Farr MA/RN Case Manager  Inpatient Care Coordination  [facility]   [phone]

## 2020-05-14 NOTE — TELEPHONE ENCOUNTER
Please contact patient for In-patient follow up.  [phone] (home)     Visit date: 5/13/2020  Diagnosis listed:Appendicitis, Acute Appendicitis with Perforation, Localized Peritonitis, And Abscess, Unspecified whether Gangrene Pr  Number of visits in past 12 months:0/1

## 2020-05-14 NOTE — TELEPHONE ENCOUNTER
"ED / Discharge Outreach Protocol    Patient Contact    Attempt # 1    Was call answered?  Yes.  \"May I please speak with <patient name>\"  Is patient available?   Yes    Hospital/TCU/ED for chronic condition Discharge Protocol    \"Hi, my name is Nara Panchal RN, a registered nurse, and I am calling from [facility].  I am calling to follow up and see how things are going for you after your recent emergency visit/hospital/TCU stay.\"    Tell me how you are doing now that you are home?\" I came home from the hospital yesterday and I am feeling better and I can move and have no pain       Discharge Instructions    \"Let's review your discharge instructions.  What is/are the follow-up recommendations?  Pt. Response: I am taking my antibiotic and I do not have any more questions     \"Has an appointment with your primary care provider been scheduled?\"   No (schedule appointment)    \"When you see the provider, I would recommend that you bring your medications with you.\"    Medications    \"Tell me what changed about your medicines when you discharged?\"    Changes to chronic meds?    0-1    \"What questions do you have about your medications?\"    None     New diagnoses of heart failure, COPD, diabetes, or MI?    No              Post Discharge Medication Reconciliation Status: discharge medications reconciled, continue medications without change.    Was MTM referral placed (*Make sure to put transitions as reason for referral)?   No    Call Summary    \"What questions or concerns do you have about your recent visit and your follow-up care?\"     none    \"If you have questions or things don't continue to improve, we encourage you contact us through the main clinic number (give number).  Even if the clinic is not open, triage nurses are available 24/7 to help you.     We would like you to know that our clinic has extended hours (provide information).  We also have urgent care (provide details on closest location and hours/contact " "info)\"      \"Thank you for your time and take care!\"    Nara Panchal RN on 5/14/2020 at 1:24 PM           "

## 2020-05-14 NOTE — PROGRESS NOTES
Clinic Care Coordination Contact    Clinic Care Coordination Contact  OUTREACH    Referral Information:  Referral Source: IP Handoff    Primary Diagnosis: GI Disorders    Chief Complaint   Patient presents with     Clinic Care Coordination - Post Hospital     ED/IP follow up- Acute appendicitis        Universal Utilization:   Clinic Utilization  Difficulty keeping appointments:: No  Compliance Concerns: No  No-Show Concerns: No  No PCP office visit in Past Year: No  Utilization    Last refreshed: 5/14/2020  1:35 PM:  Hospital Admissions 1           Last refreshed: 5/14/2020  1:35 PM:  ED Visits 1           Last refreshed: 5/14/2020  1:35 PM:  No Show Count (past year) 0              Current as of: 5/14/2020  1:35 PM            Clinical Concerns:  Current Medical Concerns:    Patient Active Problem List   Diagnosis     Essential hypertension, benign     Osteoporosis     Esophageal reflux     History of CVA (cerebrovascular accident)     History of endocarditis     Hearing loss     Adjustment disorder with anxious mood     History of skin cancer     Acute appendicitis with perforation and localized peritonitis, with abscess     Appendicitis with abscess     Pt evaluated at [facility] from 5/8/20 to 5/13/20:     Dayana Samano is a 89 year old female with a history of HTN, HFpEF, mitral regurgitation, prior CVA, CKD, who was admitted on 5/8/2020 after acute RLQ abdominal pain for the previous 4 days associated with nausea and anorexia.  CT abdomen and pelvis obtained in the ED revealed acute appendicitis with a small developing nazia-appendiceal abscess, lipase of 566. She was admitted by General Surgery for operative management and underwent appendectomy on 5/8/2020.     Pt indicates she has no pain and is doing well at home in [facility].  Spoke with RN and Pt.  Pt is reciving as needed assistance with toileting d/t effects of antibiotic and 2x/day temp/vitals check per pt request.  Pt reports sleeping well and able to  care for self at baseline.  Denied any questions or concerns.      Education Provided to patient: Care Coordination role and availability.     Pain  Pain (GOAL):: No  Health Maintenance Reviewed: Due/Overdue   Health Maintenance Due   Topic Date Due     DEXA  08/30/1924     LIPID  08/30/1924     ADVANCE CARE PLANNING  08/30/1924     COLORECTAL CANCER SCREENING  08/30/1934     FALL RISK ASSESSMENT  08/30/1989     MEDICARE ANNUAL WELLNESS VISIT  01/02/2008     MAMMO SCREENING  09/25/2014     Clinical Pathway: None    Medication Management:  Filled. No concerns.       Living Situation:  Current living arrangement:: I live in assisted living  Type of residence:: Assisted living    Resources and Interventions:  Current Resources:   List of home care services:: Skilled Nursing  Community Resources: None  Supplies used at home:: Incontinence Supplies     Referrals Placed: None     Patient/Caregiver understanding: Pt reports understanding and denies any additional questions or concerns at this times. THIAGO CC engaged in AIDET communication during encounter.       Future Appointments              In 2 weeks Marion Alonso MD [facility]          Plan: Pt denied any concerns and declined ongoing care coordination.  No further outreaches will be made at this time unless a new referral is made or a change in the pt's status occurs. Patient was provided with this writer's contact information and encouraged to call with any questions or concerns.     LEANNA Gallardo  Clinic Care Coordinator  [facility]ian[facility] Center[facility]enn[facility]  [phone]  [email or web address]

## 2020-05-22 ENCOUNTER — TELEPHONE (OUTPATIENT)
Dept: SURGERY | Facility: CLINIC | Age: 85
End: 2020-05-22

## 2020-05-22 NOTE — TELEPHONE ENCOUNTER
[facility]  Post op call note     Dayana Samano was called for an update regarding her recovery.  She underwent a laparoscopic by Dr. Ketn on May / 08 / 2020.  Today she tells me she is doing well and denies any complaints. She is eating a normal diet and her bowels are regular. She states her wounds are healing well.    The pathology revealed gangrenous appendicitis with perforation and abscess formation. Negative for malignancy.  This was discussed with the patient.     Patient was instructed to slowly and gradually resume all normal activities. The patient states all of her questions were answered.  She understands our discussion.  She agrees to follow up as needed or to call our office with any concerns.    Coty Mendoza PA-C      Please route or send letter to:  Primary Care Provider (PCP)

## 2020-05-29 ENCOUNTER — VIRTUAL VISIT (OUTPATIENT)
Dept: FAMILY MEDICINE | Facility: CLINIC | Age: 85
End: 2020-05-29
Payer: MEDICARE

## 2020-05-29 DIAGNOSIS — Z87.19 HISTORY OF APPENDICITIS: ICD-10-CM

## 2020-05-29 DIAGNOSIS — Z09 HOSPITAL DISCHARGE FOLLOW-UP: Primary | ICD-10-CM

## 2020-05-29 DIAGNOSIS — Z90.49 S/P APPENDECTOMY: ICD-10-CM

## 2020-05-29 DIAGNOSIS — N18.30 CKD (CHRONIC KIDNEY DISEASE) STAGE 3, GFR 30-59 ML/MIN (H): ICD-10-CM

## 2020-05-29 PROCEDURE — 99441 ZZC PHYSICIAN TELEPHONE EVALUATION 5-10 MIN GOVT: CPT | Performed by: FAMILY MEDICINE

## 2020-05-29 NOTE — PROGRESS NOTES
"Dayana Samano is a 89 year old female who is being evaluated via a billable telephone visit.      [phone]    The patient has been notified of following:     \"This telephone visit will be conducted via a call between you and your physician/provider. We have found that certain health care needs can be provided without the need for a physical exam.  This service lets us provide the care you need with a short phone conversation.  If a prescription is necessary we can send it directly to your pharmacy.  If lab work is needed we can place an order for that and you can then stop by our lab to have the test done at a later time.    Telephone visits are billed at different rates depending on your insurance coverage. During this emergency period, for some insurers they may be billed the same as an in-person visit.  Please reach out to your insurance provider with any questions.    If during the course of the call the physician/provider feels a telephone visit is not appropriate, you will not be charged for this service.\"    Patient has given verbal consent for Telephone visit?  Yes    What phone number would you like to be contacted at? [phone]    How would you like to obtain your AVS? Mail a copy    Subjective     Dayana Samano is a 89 year old female who presents via phone visit today for the following health issues:    [facility]    Hospital Follow-up Visit:    Hospital/Nursing Home/IP Rehab Facility: [facility]  Date of Admission: 05/08/20  Date of Discharge: 05/13/20  Reason(s) for Admission: ruptured appendicitis      Was your hospitalization related to COVID-19? No   Problems taking medications regularly:  None  Medication changes since discharge: Amoxicillin   Problems adhering to non-medication therapy:  None    Summary of hospitalization:  [facility] discharge summary reviewed  Diagnostic Tests/Treatments reviewed.  Follow up needed: bmp  Other Healthcare Providers Involved in Patient s " Care:         None  Update since discharge: improved. Post Discharge Medication Reconciliation: discharge medications reconciled, continue medications without change.  Plan of care communicated with patient          See under ROS     Patient Active Problem List   Diagnosis     Essential hypertension, benign     Osteoporosis     Esophageal reflux     History of CVA (cerebrovascular accident)     History of endocarditis     Hearing loss     Adjustment disorder with anxious mood     History of skin cancer     Acute appendicitis with perforation and localized peritonitis, with abscess     Appendicitis with abscess       Current Outpatient Medications   Medication Sig Dispense Refill     acetaminophen (TYLENOL) 500 MG tablet Take 1-2 tablets (500-1,000 mg) by mouth every 6 hours as needed for mild pain       aspirin (ASA) 325 MG tablet Take 325 mg by mouth daily       CALCIUM 600 + D 600-200 MG-UNIT OR TABS Take 1 tablet by mouth daily  3 MONTHS 1 YEAR     furosemide (LASIX) 40 MG tablet TAKE 1 TABLET DAILY 90 tablet 3     hypromellose (ARTIFICIAL TEARS) 0.5 % SOLN ophthalmic solution 1 drop every hour as needed for dry eyes       latanoprost (XALATAN) 0.005 % ophthalmic solution Place 1 drop into both eyes daily       lisinopril (PRINIVIL/ZESTRIL) 40 MG tablet Take 1 tablet (40 mg) by mouth daily 90 tablet 1     metoprolol tartrate (LOPRESSOR) 100 MG tablet Take 1 tablet (100 mg) by mouth 2 times daily 180 tablet 1     multivitamin (OCUVITE) TABS tablet Take 1 tablet by mouth daily       potassium chloride ER (KLOR-CON) 20 MEQ CR tablet Take 1 tablet (20 mEq) by mouth daily 90 tablet 1     timolol maleate (TIMOPTIC) 0.5 % ophthalmic solution Place 1 drop into both eyes every evening            Reviewed and updated as needed this visit by Provider         Review of Systems   CONSTITUTIONAL:NEGATIVE for fever, chills, change in weight  RESP:NEGATIVE for significant cough or SOB  CV: NEGATIVE for chest pain, palpitations  or peripheral edema  GI: . Occasional brief pain around navel; will go away.   Bowels not soft and not real hard. Mild strain to get moving; and does have one daily.  PSYCHIATRIC: NEGATIVE for changes in mood or affect    Notes did not have high fever.   Thought the abdominal pain was her usual aches and pains.  Pain had become more constant.  Was a little concerned about going in, as would need to be in quarantine when returned.   Out of quarantine since yesterday.    Has been feeling well since out of the hospital.  Eating is a chore, but not because of this. Not new. No taste buds. Weight has been stable.     Lives at [facility] in .       Objective   Reported vitals:  There were no vitals taken for this visit.   alert and no distress  PSYCH: Alert and oriented times 3; coherent speech, normal   rate and volume, able to articulate logical thoughts, able   to abstract reason.  Her affect is normal  RESP: No cough, no audible wheezing, able to talk in full sentences  Remainder of exam unable to be completed due to telephone visits    Hemoglobin   Date Value Ref Range Status   05/13/2020 12.3 11.7 - 15.7 g/dL Final   05/12/2020 12.5 11.7 - 15.7 g/dL Final       GFR Estimate   Date Value Ref Range Status   05/13/2020 49 (L) >60 mL/min/[1.73_m2] Final     Comment:     Non  GFR Calc  Starting 12/18/2018, serum creatinine based estimated GFR (eGFR) will be   calculated using the Chronic Kidney Disease Epidemiology Collaboration   (CKD-EPI) equation.     05/12/2020 54 (L) >60 mL/min/[1.73_m2] Final     Comment:     Non  GFR Calc  Starting 12/18/2018, serum creatinine based estimated GFR (eGFR) will be   calculated using the Chronic Kidney Disease Epidemiology Collaboration   (CKD-EPI) equation.     05/11/2020 45 (L) >60 mL/min/[1.73_m2] Final     Comment:     Non  GFR Calc  Starting 12/18/2018, serum creatinine based estimated GFR (eGFR) will be   calculated using the Chronic  Kidney Disease Epidemiology Collaboration   (CKD-EPI) equation.                     Assessment/Plan:  1. Hospital discharge follow-up      2. History of appendicitis  Stable. Had her appendix out. Completed her antibiotics.     3. S/P appendectomy  Recovering well.     4. CKD (chronic kidney disease) stage 3, GFR 30-59 ml/min (H)  Recommend a recheck bmp in the next several weeks.  - Basic metabolic panel; Future    Return in about 4 weeks (around 6/26/2020) for Lab Work.      Phone call duration:  10 minutes    Marion Alonso MD, MD

## 2020-05-29 NOTE — PATIENT INSTRUCTIONS
Drink enough to keep your urine a light yellow.  We would like to do a blood test to recheck kidney function in the near future.

## 2020-05-31 PROBLEM — K35.33 ACUTE APPENDICITIS WITH PERFORATION AND LOCALIZED PERITONITIS, WITH ABSCESS: Status: RESOLVED | Noted: 2020-05-08 | Resolved: 2020-05-31

## 2020-05-31 PROBLEM — K35.33 APPENDICITIS WITH ABSCESS: Status: RESOLVED | Noted: 2020-05-09 | Resolved: 2020-05-31

## 2020-06-30 DIAGNOSIS — Z86.79 HISTORY OF ENDOCARDITIS: ICD-10-CM

## 2020-06-30 DIAGNOSIS — I10 ESSENTIAL HYPERTENSION, BENIGN: ICD-10-CM

## 2020-06-30 RX ORDER — LISINOPRIL 40 MG/1
TABLET ORAL
Qty: 90 TABLET | Refills: 0 | Status: SHIPPED | OUTPATIENT
Start: 2020-06-30 | End: 2020-09-29

## 2020-06-30 NOTE — TELEPHONE ENCOUNTER
Called patient. Patient is due for labs. Patient lives in the [facility] and needs permission to leave due to COVID. She will also need her daughter to bring her for her lab appointment. She will call her daughter and talk her assisted living and give us a call back to schedule. Will give maya refill for medication.     Nara Panchal RN on 6/30/2020 at 11:10 AM

## 2020-07-02 DIAGNOSIS — Z86.79 HISTORY OF ENDOCARDITIS: ICD-10-CM

## 2020-07-02 RX ORDER — POTASSIUM CHLORIDE 1500 MG/1
TABLET, EXTENDED RELEASE ORAL
Qty: 90 TABLET | Refills: 0 | Status: SHIPPED | OUTPATIENT
Start: 2020-07-02 | End: 2020-09-30

## 2020-07-02 NOTE — TELEPHONE ENCOUNTER
Routing refill request to provider for review/approval because:  Labs not current:  K  Lab scheduled in 4 days

## 2020-07-14 DIAGNOSIS — I10 ESSENTIAL HYPERTENSION, BENIGN: ICD-10-CM

## 2020-07-14 DIAGNOSIS — E83.52 HYPERCALCEMIA: ICD-10-CM

## 2020-07-14 DIAGNOSIS — N18.30 CKD (CHRONIC KIDNEY DISEASE) STAGE 3, GFR 30-59 ML/MIN (H): ICD-10-CM

## 2020-07-14 PROCEDURE — 80069 RENAL FUNCTION PANEL: CPT | Performed by: FAMILY MEDICINE

## 2020-07-14 PROCEDURE — 36415 COLL VENOUS BLD VENIPUNCTURE: CPT | Performed by: FAMILY MEDICINE

## 2020-07-15 LAB
ALBUMIN SERPL-MCNC: 4.4 G/DL (ref 3.4–5)
ANION GAP SERPL CALCULATED.3IONS-SCNC: 4 MMOL/L (ref 3–14)
BUN SERPL-MCNC: 29 MG/DL (ref 7–30)
CALCIUM SERPL-MCNC: 10.4 MG/DL (ref 8.5–10.1)
CHLORIDE SERPL-SCNC: 104 MMOL/L (ref 94–109)
CO2 SERPL-SCNC: 31 MMOL/L (ref 20–32)
CREAT SERPL-MCNC: 0.98 MG/DL (ref 0.52–1.04)
GFR SERPL CREATININE-BSD FRML MDRD: 48 ML/MIN/{1.73_M2}
GLUCOSE SERPL-MCNC: 98 MG/DL (ref 70–99)
PHOSPHATE SERPL-MCNC: 3.7 MG/DL (ref 2.5–4.5)
POTASSIUM SERPL-SCNC: 4.5 MMOL/L (ref 3.4–5.3)
SODIUM SERPL-SCNC: 139 MMOL/L (ref 133–144)

## 2020-09-16 DIAGNOSIS — Z86.79 HISTORY OF ENDOCARDITIS: ICD-10-CM

## 2020-09-16 DIAGNOSIS — I10 ESSENTIAL HYPERTENSION, BENIGN: ICD-10-CM

## 2020-09-16 RX ORDER — METOPROLOL TARTRATE 100 MG
TABLET ORAL
Qty: 180 TABLET | Refills: 1 | Status: SHIPPED | OUTPATIENT
Start: 2020-09-16 | End: 2021-03-15

## 2020-11-22 ENCOUNTER — HEALTH MAINTENANCE LETTER (OUTPATIENT)
Age: 85
End: 2020-11-22

## 2020-12-27 DIAGNOSIS — Z86.79 HISTORY OF ENDOCARDITIS: ICD-10-CM

## 2020-12-27 DIAGNOSIS — I10 ESSENTIAL HYPERTENSION, BENIGN: ICD-10-CM

## 2020-12-28 RX ORDER — LISINOPRIL 40 MG/1
TABLET ORAL
Qty: 90 TABLET | Refills: 1 | Status: SHIPPED | OUTPATIENT
Start: 2020-12-28 | End: 2021-06-10

## 2021-03-15 DIAGNOSIS — I10 ESSENTIAL HYPERTENSION, BENIGN: ICD-10-CM

## 2021-03-15 DIAGNOSIS — Z86.79 HISTORY OF ENDOCARDITIS: ICD-10-CM

## 2021-03-15 RX ORDER — METOPROLOL TARTRATE 100 MG
TABLET ORAL
Qty: 180 TABLET | Refills: 0 | Status: SHIPPED | OUTPATIENT
Start: 2021-03-15 | End: 2021-06-10

## 2021-03-20 DIAGNOSIS — Z86.79 HISTORY OF ENDOCARDITIS: ICD-10-CM

## 2021-03-22 RX ORDER — FUROSEMIDE 40 MG
TABLET ORAL
Qty: 90 TABLET | Refills: 0 | Status: SHIPPED | OUTPATIENT
Start: 2021-03-22 | End: 2021-06-03

## 2021-03-29 DIAGNOSIS — Z86.79 HISTORY OF ENDOCARDITIS: ICD-10-CM

## 2021-03-29 RX ORDER — POTASSIUM CHLORIDE 1500 MG/1
TABLET, EXTENDED RELEASE ORAL
Qty: 90 TABLET | Refills: 0 | Status: SHIPPED | OUTPATIENT
Start: 2021-03-29 | End: 2021-06-08

## 2021-03-29 NOTE — TELEPHONE ENCOUNTER
"Requested Prescriptions   Pending Prescriptions Disp Refills     potassium chloride ER (KLOR-CON M) 20 MEQ CR tablet [Pharmacy Med Name: POTASSIUM CHLORIDE ER (DISP) TABS 20MEQ]    Last Written Prescription Date:  09/30/2020  Last Fill Quantity: 90 tablet,  # refills: 1   Last office visit: 1/14/2020 with prescribing provider:  Gavin   Future Office Visit:       90 tablet 3     Sig: TAKE 1 TABLET DAILY       Potassium Supplements Protocol Passed - 3/29/2021  2:21 AM        Passed - Recent (12 mo) or future (30 days) visit within the authorizing provider's department     Patient has had an office visit with the authorizing provider or a provider within the authorizing providers department within the previous 12 mos or has a future within next 30 days. See \"Patient Info\" tab in inbasket, or \"Choose Columns\" in Meds & Orders section of the refill encounter.              Passed - Medication is active on med list        Passed - Patient is age 18 or older        Passed - Normal serum potassium in past 12 months     Recent Labs   Lab Test 07/14/20  1413   POTASSIUM 4.5                          "

## 2021-04-04 ENCOUNTER — HEALTH MAINTENANCE LETTER (OUTPATIENT)
Age: 86
End: 2021-04-04

## 2021-05-13 ENCOUNTER — TELEPHONE (OUTPATIENT)
Dept: FAMILY MEDICINE | Facility: CLINIC | Age: 86
End: 2021-05-13

## 2021-05-13 NOTE — TELEPHONE ENCOUNTER
Daughter calling she would like to change her PCP to Dr. Jim Mcdaniel. Needs to establish care. Ananya Cid RN on 5/13/2021 at 5:38 PM

## 2021-05-14 NOTE — TELEPHONE ENCOUNTER
Changed PCP in [facility]---[facility]

## 2021-05-30 DIAGNOSIS — Z86.79 HISTORY OF ENDOCARDITIS: ICD-10-CM

## 2021-06-03 RX ORDER — FUROSEMIDE 40 MG
TABLET ORAL
Qty: 90 TABLET | Refills: 0 | Status: SHIPPED | OUTPATIENT
Start: 2021-06-03 | End: 2021-06-10

## 2021-06-05 DIAGNOSIS — Z86.79 HISTORY OF ENDOCARDITIS: ICD-10-CM

## 2021-06-05 DIAGNOSIS — I10 ESSENTIAL HYPERTENSION, BENIGN: ICD-10-CM

## 2021-06-10 ENCOUNTER — ANCILLARY PROCEDURE (OUTPATIENT)
Dept: GENERAL RADIOLOGY | Facility: CLINIC | Age: 86
End: 2021-06-10
Attending: FAMILY MEDICINE
Payer: MEDICARE

## 2021-06-10 ENCOUNTER — OFFICE VISIT (OUTPATIENT)
Dept: FAMILY MEDICINE | Facility: CLINIC | Age: 86
End: 2021-06-10
Payer: MEDICARE

## 2021-06-10 VITALS
DIASTOLIC BLOOD PRESSURE: 72 MMHG | TEMPERATURE: 97.8 F | BODY MASS INDEX: 25.36 KG/M2 | HEIGHT: 59 IN | SYSTOLIC BLOOD PRESSURE: 128 MMHG | OXYGEN SATURATION: 95 % | HEART RATE: 75 BPM | WEIGHT: 125.8 LBS

## 2021-06-10 DIAGNOSIS — D69.6 THROMBOCYTOPENIA (H): ICD-10-CM

## 2021-06-10 DIAGNOSIS — I10 ESSENTIAL HYPERTENSION, BENIGN: ICD-10-CM

## 2021-06-10 DIAGNOSIS — E87.6 HYPOKALEMIA: ICD-10-CM

## 2021-06-10 DIAGNOSIS — Z86.79 HISTORY OF ENDOCARDITIS: ICD-10-CM

## 2021-06-10 DIAGNOSIS — N18.31 STAGE 3A CHRONIC KIDNEY DISEASE (H): ICD-10-CM

## 2021-06-10 DIAGNOSIS — M89.8X2 PAIN OF LEFT HUMERUS: Primary | ICD-10-CM

## 2021-06-10 PROBLEM — K63.5 COLON POLYPS: Status: ACTIVE | Noted: 2021-06-10

## 2021-06-10 PROBLEM — E55.9 VITAMIN D DEFICIENCY: Status: ACTIVE | Noted: 2021-06-10

## 2021-06-10 PROBLEM — M75.100 ROTATOR CUFF TEAR: Status: ACTIVE | Noted: 2021-06-10

## 2021-06-10 PROBLEM — S32.592A CLOSED FRACTURE OF RAMUS OF LEFT PUBIS (H): Status: ACTIVE | Noted: 2017-09-21

## 2021-06-10 PROBLEM — S32.592A CLOSED FRACTURE OF RAMUS OF LEFT PUBIS (H): Status: RESOLVED | Noted: 2017-09-21 | Resolved: 2021-06-10

## 2021-06-10 PROBLEM — Z66 DNR (DO NOT RESUSCITATE): Status: ACTIVE | Noted: 2021-06-10

## 2021-06-10 PROBLEM — S72.91XA CLOSED FRACTURE OF RIGHT FEMUR (H): Status: RESOLVED | Noted: 2017-06-26 | Resolved: 2021-06-10

## 2021-06-10 PROBLEM — I34.0 MITRAL REGURGITATION: Status: ACTIVE | Noted: 2017-05-14

## 2021-06-10 PROBLEM — N18.30 CHRONIC KIDNEY DISEASE, STAGE 3 (H): Status: ACTIVE | Noted: 2021-06-10

## 2021-06-10 PROBLEM — S72.91XA CLOSED FRACTURE OF RIGHT FEMUR (H): Status: ACTIVE | Noted: 2017-06-26

## 2021-06-10 PROBLEM — Z47.89 AFTERCARE FOLLOWING SURGERY OF THE MUSCULOSKELETAL SYSTEM: Status: ACTIVE | Noted: 2017-05-30

## 2021-06-10 PROBLEM — Z47.89 AFTERCARE FOLLOWING SURGERY OF THE MUSCULOSKELETAL SYSTEM: Status: RESOLVED | Noted: 2017-05-30 | Resolved: 2021-06-10

## 2021-06-10 LAB
ERYTHROCYTE [DISTWIDTH] IN BLOOD BY AUTOMATED COUNT: 12.6 % (ref 10–15)
HCT VFR BLD AUTO: 38.3 % (ref 35–47)
HGB BLD-MCNC: 12.7 G/DL (ref 11.7–15.7)
MCH RBC QN AUTO: 32.8 PG (ref 26.5–33)
MCHC RBC AUTO-ENTMCNC: 33.2 G/DL (ref 31.5–36.5)
MCV RBC AUTO: 99 FL (ref 78–100)
PLATELET # BLD AUTO: 142 10E9/L (ref 150–450)
RBC # BLD AUTO: 3.87 10E12/L (ref 3.8–5.2)
WBC # BLD AUTO: 4.9 10E9/L (ref 4–11)

## 2021-06-10 PROCEDURE — 85027 COMPLETE CBC AUTOMATED: CPT | Performed by: FAMILY MEDICINE

## 2021-06-10 PROCEDURE — 73060 X-RAY EXAM OF HUMERUS: CPT | Mod: LT | Performed by: RADIOLOGY

## 2021-06-10 PROCEDURE — 80053 COMPREHEN METABOLIC PANEL: CPT | Performed by: FAMILY MEDICINE

## 2021-06-10 PROCEDURE — 99214 OFFICE O/P EST MOD 30 MIN: CPT | Performed by: FAMILY MEDICINE

## 2021-06-10 PROCEDURE — 84443 ASSAY THYROID STIM HORMONE: CPT | Performed by: FAMILY MEDICINE

## 2021-06-10 PROCEDURE — 36415 COLL VENOUS BLD VENIPUNCTURE: CPT | Performed by: FAMILY MEDICINE

## 2021-06-10 RX ORDER — AMOXICILLIN 250 MG
1 CAPSULE ORAL
COMMUNITY
End: 2021-06-10

## 2021-06-10 RX ORDER — LISINOPRIL 40 MG/1
TABLET ORAL
Qty: 90 TABLET | Refills: 3 | Status: SHIPPED | OUTPATIENT
Start: 2021-06-10 | End: 2022-05-16

## 2021-06-10 RX ORDER — AMOXICILLIN 500 MG/1
CAPSULE ORAL
Status: ON HOLD | COMMUNITY
Start: 2021-05-25 | End: 2024-04-12

## 2021-06-10 RX ORDER — HYDROCORTISONE 2.5 %
CREAM (GRAM) TOPICAL
COMMUNITY
Start: 2021-05-03 | End: 2023-08-02

## 2021-06-10 RX ORDER — KETOCONAZOLE 20 MG/ML
SHAMPOO TOPICAL DAILY PRN
COMMUNITY
Start: 2021-05-03

## 2021-06-10 RX ORDER — GENTAMICIN SULFATE 1 MG/G
OINTMENT TOPICAL
COMMUNITY
Start: 2020-10-27 | End: 2021-06-10

## 2021-06-10 RX ORDER — ATORVASTATIN CALCIUM 10 MG/1
10 TABLET, FILM COATED ORAL
COMMUNITY
End: 2021-06-10

## 2021-06-10 RX ORDER — CALCIUM CARBONATE 500(1250)
500 TABLET,CHEWABLE ORAL
COMMUNITY
End: 2023-08-02

## 2021-06-10 RX ORDER — METOPROLOL TARTRATE 100 MG
100 TABLET ORAL 2 TIMES DAILY
Qty: 180 TABLET | Refills: 3 | Status: SHIPPED | OUTPATIENT
Start: 2021-06-10 | End: 2022-05-16

## 2021-06-10 RX ORDER — LOSARTAN POTASSIUM 25 MG/1
50 TABLET ORAL
COMMUNITY
End: 2021-06-10 | Stop reason: ALTCHOICE

## 2021-06-10 RX ORDER — FUROSEMIDE 40 MG
40 TABLET ORAL DAILY
Qty: 90 TABLET | Refills: 3 | Status: SHIPPED | OUTPATIENT
Start: 2021-06-10 | End: 2022-07-12

## 2021-06-10 RX ORDER — NYSTATIN 100000 [USP'U]/G
1 POWDER TOPICAL
COMMUNITY
End: 2021-06-10

## 2021-06-10 RX ORDER — RISEDRONATE SODIUM 35 MG/1
35 TABLET, FILM COATED ORAL
COMMUNITY
End: 2021-06-10

## 2021-06-10 RX ORDER — FLUTICASONE PROPIONATE 50 MCG
1 SPRAY, SUSPENSION (ML) NASAL
COMMUNITY
End: 2023-08-02

## 2021-06-10 RX ORDER — POTASSIUM CHLORIDE 1500 MG/1
40 TABLET, EXTENDED RELEASE ORAL DAILY
Qty: 180 TABLET | Refills: 3 | Status: SHIPPED | OUTPATIENT
Start: 2021-06-10 | End: 2021-09-09

## 2021-06-10 ASSESSMENT — MIFFLIN-ST. JEOR: SCORE: 862.29

## 2021-06-10 NOTE — PROGRESS NOTES
"PT    Assessment & Plan     Pain of left humerus  Ordering Xrays given history of osteoporosis and pain, recommending PT.  - XR Humerus Left G/E 2 Views  - [facility] REFERRAL; Future    Stage 3a chronic kidney disease  Due for labs, will check today.    Essential hypertension, benign  Refill medications, due for labs.  - metoprolol tartrate (LOPRESSOR) 100 MG tablet; Take 1 tablet (100 mg) by mouth 2 times daily  - Comprehensive metabolic panel (BMP + Alb, Alk Phos, ALT, AST, Total. Bili, TP)  - TSH with free T4 reflex    Hypokalemia  Due for labs    Thrombocytopenia (H)  Due for labs  - CBC with platelets    History of endocarditis  Refill medications.  Has not seen a cardiology in some time.  - furosemide (LASIX) 40 MG tablet; Take 1 tablet (40 mg) by mouth daily  - metoprolol tartrate (LOPRESSOR) 100 MG tablet; Take 1 tablet (100 mg) by mouth 2 times daily  - potassium chloride ER (KLOR-CON M) 20 MEQ CR tablet; Take 2 tablets (40 mEq) by mouth daily    35 minutes spent on the date of the encounter doing chart review, history and exam, documentation and further activities per the note     BMI:   Estimated body mass index is 25.63 kg/m  as calculated from the following:    Height as of this encounter: 1.492 m (4' 10.75\").    Weight as of this encounter: 57.1 kg (125 lb 12.8 oz).       See Patient Instructions    Return in about 6 months (around 12/10/2021) for BP Recheck.    Elisha Mcdaniel MD  [facility]    Gerhard Anne is a 89 year old who presents for the following health issues  accompanied by her daughter Marion:    HPI     Hypertension Follow-up      Do you check your blood pressure regularly outside of the clinic? Yes     Are you following a low salt diet? No    Are your blood pressures ever more than 140 on the top number (systolic) OR more   than 90 on the bottom number (diastolic), for example 140/90? No       How many servings of fruits and vegetables do you eat " "daily?  4 or more    On average, how many sweetened beverages do you drink each day (Examples: soda, juice, sweet tea, etc.  Do NOT count diet or artificially sweetened beverages)?   1    How many days per week do you exercise enough to make your heart beat faster? 7    How many minutes a day do you exercise enough to make your heart beat faster? 30 - 60    How many days per week do you miss taking your medication? 0    Musculoskeletal problem/pain  Onset/Duration: 1 week  Description  Location: upper arm - left  Joint Swelling: no  Redness: no  Pain: YES  Warmth: no  Intensity:  moderate  Progression of Symptoms:  improving and intermittent  Accompanying signs and symptoms:   Fevers: no  Numbness/tingling/weakness: YES- when pain first started, not currently & some weakness  History  Trauma to the area: no  Recent illness:  no  Previous similar problem: no  Previous evaluation:  no  Precipitating or alleviating factors:  Aggravating factors include: lifting, exercise and overuse  Therapies tried and outcome: heat and Ibuprofen    Pain in left arm, still able to use, can raise it, but now having increased pain, decreased strength.  Left upper arm feels somewhat weak, feels better with heat and ibuprofen.    Needs prescriptions renewed.    Review of Systems   Constitutional, HEENT, cardiovascular, pulmonary, gi and gu systems are negative, except as otherwise noted.      Objective    /72 (BP Location: Right arm, Patient Position: Sitting, Cuff Size: Adult Regular)   Pulse 75   Temp 97.8  F (36.6  C) (Oral)   Ht 1.492 m (4' 10.75\")   Wt 57.1 kg (125 lb 12.8 oz)   SpO2 95%   BMI 25.63 kg/m    Body mass index is 25.63 kg/m .  Physical Exam   GENERAL: healthy, alert and no distress  RESP: lungs clear to auscultation - no rales, rhonchi or wheezes  CV: regular rate and rhythm  MS: no gross musculoskeletal defects noted, no edema.  No focal abnormality on exam.  Tenderness along bicep, ROM limited in all " directions due to pain.  History of rotator cuff tendinopathy but cannot recall which side.  SKIN: no suspicious lesions or rashes

## 2021-06-11 LAB
ALBUMIN SERPL-MCNC: 4.4 G/DL (ref 3.4–5)
ALP SERPL-CCNC: 59 U/L (ref 40–150)
ALT SERPL W P-5'-P-CCNC: 24 U/L (ref 0–50)
ANION GAP SERPL CALCULATED.3IONS-SCNC: 4 MMOL/L (ref 3–14)
AST SERPL W P-5'-P-CCNC: 28 U/L (ref 0–45)
BILIRUB SERPL-MCNC: 0.6 MG/DL (ref 0.2–1.3)
BUN SERPL-MCNC: 27 MG/DL (ref 7–30)
CALCIUM SERPL-MCNC: 10.4 MG/DL (ref 8.5–10.1)
CHLORIDE SERPL-SCNC: 103 MMOL/L (ref 94–109)
CO2 SERPL-SCNC: 31 MMOL/L (ref 20–32)
CREAT SERPL-MCNC: 1.13 MG/DL (ref 0.52–1.04)
GFR SERPL CREATININE-BSD FRML MDRD: 41 ML/MIN/{1.73_M2}
GLUCOSE SERPL-MCNC: 98 MG/DL (ref 70–99)
POTASSIUM SERPL-SCNC: 5 MMOL/L (ref 3.4–5.3)
PROT SERPL-MCNC: 7.2 G/DL (ref 6.8–8.8)
SODIUM SERPL-SCNC: 138 MMOL/L (ref 133–144)
TSH SERPL DL<=0.005 MIU/L-ACNC: 0.6 MU/L (ref 0.4–4)

## 2021-06-15 ENCOUNTER — TELEPHONE (OUTPATIENT)
Dept: FAMILY MEDICINE | Facility: CLINIC | Age: 86
End: 2021-06-15

## 2021-06-15 NOTE — TELEPHONE ENCOUNTER
ACH out of office, called LOLIS Rothman to call us back, confirm order, see if needs written order? Do we have to fax? Home care not calling, therapy is inside of [facility]SL facility  Marion Win RN, BSN  Message handled by CLINIC NURSE.

## 2021-06-15 NOTE — TELEPHONE ENCOUNTER
Reason for Call: Request for an order or referral:    Order or referral being requested: Requesting verbal orders for physical therapy eval and treat for left shoulder pain, physical therapy clinic is inside assisted living facility so that patient does not have to travel     Date needed: as soon as possible    Has the patient been seen by the PCP for this problem? YES    Additional comments: none     Phone number Patient can be reached at:  Other phone number:  [phone]    Best Time:  any    Can we leave a detailed message on this number?  YES    Call taken on 6/15/2021 at 1:11 PM by Oanh Sanderson

## 2021-06-16 NOTE — TELEPHONE ENCOUNTER
Physical therapist Stephan returning call to RN.  contacted the [facility] and then transferred to ANNY Schultz. No answer and no voicemail was available on the line  was transferred to. Stephan asked if the RN could please return his call at: [phone].    Thank you  Nadya Anderson  Central Scheduling

## 2021-06-16 NOTE — TELEPHONE ENCOUNTER
Called Stephan, pt's facility has out patient physical therapy, pt wants shoulder eval by this, wants verbal order to evaluate, will fax orders for signature after eval, routed to [facility], please advise, route to inform delbert Rothman RN, BSN  Message handled by CLINIC NURSE.

## 2021-06-21 NOTE — TELEPHONE ENCOUNTER
Called and spoke with SLIM Rothman. Verbal ok for orders given. Orders have not been faxed yet. Gave [facility] fax number. Forms will be faxed for provider signature. Please look for receipt of fax. Thank you.     Taran VELEZ RN

## 2021-08-19 ENCOUNTER — MEDICAL CORRESPONDENCE (OUTPATIENT)
Dept: HEALTH INFORMATION MANAGEMENT | Facility: CLINIC | Age: 86
End: 2021-08-19
Payer: MEDICARE

## 2021-08-19 ENCOUNTER — TRANSFERRED RECORDS (OUTPATIENT)
Dept: HEALTH INFORMATION MANAGEMENT | Facility: CLINIC | Age: 86
End: 2021-08-19

## 2021-09-07 ENCOUNTER — TELEPHONE (OUTPATIENT)
Dept: FAMILY MEDICINE | Facility: CLINIC | Age: 86
End: 2021-09-07

## 2021-09-07 DIAGNOSIS — Z86.79 HISTORY OF ENDOCARDITIS: ICD-10-CM

## 2021-09-08 ENCOUNTER — NURSE TRIAGE (OUTPATIENT)
Dept: NURSING | Facility: CLINIC | Age: 86
End: 2021-09-08

## 2021-09-08 DIAGNOSIS — E87.6 HYPOKALEMIA: Primary | ICD-10-CM

## 2021-09-08 NOTE — TELEPHONE ENCOUNTER
Have her take one tablet daily.  They are often not supposed to be cut, so recommend that she try to dissolve them in water first and drink that instead.  If she struggles with that, we can send in a prescription for potassium powder, but that is very expensive.    Lets also get her in for a lab check, see what the potassium level is at.      09/09/2021 @ 5:11pm-Call back to patient's daughter. She stated that they spoke to someone earlier today, and will only do 1 tablet daily.  Will not break in half.  Aware to recheck potassium level as well.    Shikha Hosbon RN  [facility] Nurse Advisor  9/9/2021 at 5:13 PM

## 2021-09-08 NOTE — TELEPHONE ENCOUNTER
Pt's daughter Marion, is calling.  Consent on file.    Had always been on 20 meq of potassium daily. Tablets are very large. She has always been chewing them. Script says not to chew. She will start cutting them in half.     Was seen by a new PCP this summer. Had new scripts sent in by this PCP.  The new script said to take 2 tabs a day, not 1 tab a day. Wondering which one is correct.  Has always taken 1 tab a day. Do you want her taking 2 tabs daily of potassium now? She is wondering why the change?    I advised her that I would send the message to her care team, and have them call her back after speaking to the physician.    COVID 19 Nurse Triage Plan/Patient Instructions    Please be aware that novel coronavirus (COVID-19) may be circulating in the community. If you develop symptoms such as fever, cough, or SOB or if you have concerns about the presence of another infection including coronavirus (COVID-19), please contact your health care provider or visit [email or web address].     Disposition/Instructions    Home care recommended. Follow home care protocol based instructions.    Thank you for taking steps to prevent the spread of this virus.  o Limit your contact with others.  o Wear a simple mask to cover your cough.  o Wash your hands well and often.    Resources    [facility]: About COVID-19: [email or web address]    CDC: What to Do If You're Sick: www.cdc.gov/coronavirus/2019-ncov/about/steps-when-sick.html    CDC: Ending Home Isolation: www.cdc.gov/coronavirus/2019-ncov/hcp/disposition-in-home-patients.html     CDC: Caring for Someone: www.cdc.gov/coronavirus/2019-ncov/if-you-are-sick/care-for-someone.html     [facility]: Interim Guidance for Hospital Discharge to Home: www.health.[facility].mn.us/diseases/coronavirus/hcp/hospdischarge.pdf    [facility] clinical trials (COVID-19 research studies): [email or web address].[facility]/umn-clinical-trials     Below are the COVID-19 hotlines at  the [facility] ([facility]). Interpreters are available.   o For health questions: Call 023-935-2988 or 1-456.441.7358 (7 a.m. to 7 p.m.)  o For questions about schools and childcare: Call 126-165-8081 or 1-939.478.4006 (7 a.m. to 7 p.m.)     Additional Information    Negative: Drug overdose and triager unable to answer question    Negative: Caller requesting information unrelated to medicine    Negative: Caller requesting a prescription for Strep throat and has a positive culture result    Negative: Rash while taking a medication or within 3 days of stopping it    Negative: Immunization reaction suspected    Negative: Asthma and having symptoms of asthma (cough, wheezing, etc.)    Negative: Breastfeeding questions about mother's medicines and diet    Negative: MORE THAN A DOUBLE DOSE of a prescription or over-the-counter (OTC) drug    Negative: DOUBLE DOSE (an extra dose or lesser amount) of over-the-counter (OTC) drug and any symptoms (e.g., dizziness, nausea, pain, sleepiness)    Negative: DOUBLE DOSE (an extra dose or lesser amount) of prescription drug and any symptoms (e.g., dizziness, nausea, pain, sleepiness)    Negative: Took another person's prescription drug    Negative: DOUBLE DOSE (an extra dose or lesser amount) of prescription drug and NO symptoms (Exception: a double dose of antibiotics)    Negative: Diabetes drug error or overdose (e.g., took wrong type of insulin or took extra dose)    Negative: Caller has medication question about med not prescribed by PCP and triager unable to answer question (e.g., compatibility with other med, storage)    Negative: Request for URGENT new prescription or refill of 'essential' medication (i.e., likelihood of harm to patient if not taken) and triager unable to fill per department policy    Negative: Prescription not at pharmacy and was prescribed today by PCP    Negative: Pharmacy calling with prescription questions and triager unable to answer  question    Negative: Caller has urgent medication question about med that PCP prescribed and triager unable to answer question    Negative: Caller has NON-URGENT medication question about med that PCP prescribed and triager unable to answer question    Negative: Caller requesting a NON-URGENT new prescription or refill and triager unable to refill per department policy    Negative: DOUBLE DOSE (an extra dose or lesser amount) of over-the-counter (OTC) drug and NO symptoms    Negative: DOUBLE DOSE (an extra dose or lesser amount) of antibiotic drug and NO symptoms    Caller has medication question only, adult not sick, and triager answers question    Protocols used: MEDICATION QUESTION CALL-A-OH    Shikha Hobson RN  [facility] Nurse Advisor  9/8/2021 at 9:58 AM

## 2021-09-09 RX ORDER — POTASSIUM CHLORIDE 1500 MG/1
20 TABLET, EXTENDED RELEASE ORAL DAILY
Qty: 90 TABLET | Refills: 3 | Status: SHIPPED | OUTPATIENT
Start: 2021-09-09 | End: 2022-07-12

## 2021-09-09 NOTE — TELEPHONE ENCOUNTER
Call to patient [phone] (home).  Per Dr. Jim Mcdaniel, patient is to take one tablet of potassium 20 meq daily. Informed patient not to crush or split tablet but may dissolve in a small amount of water and drink. Also informed Dr will prescribe powder form of potassium but it is expensive. Dr also wants patient to come in for Lab Only appointment. There is a future order for basic metabolic panel (BMP). Patient should be fasting.  Patient was given an opportunity to ask questions, verbalized understanding of plan, and is agreeable. Patient requests this writer to call daughterMarion to scheduled appointment and give update.    Call to Marion ramirez. Updated with information above. Lab Only appointment scheduled.     Potassium order still reads to take two tablets daily. Routed to Dr. Jim Mcdaniel to write new prescription for one tablet daily. Send to mail order pharmacy.  Alexa Ash RN

## 2021-09-15 ENCOUNTER — LAB (OUTPATIENT)
Dept: LAB | Facility: CLINIC | Age: 86
End: 2021-09-15

## 2021-09-15 ENCOUNTER — ALLIED HEALTH/NURSE VISIT (OUTPATIENT)
Dept: FAMILY MEDICINE | Facility: CLINIC | Age: 86
End: 2021-09-15
Payer: MEDICARE

## 2021-09-15 DIAGNOSIS — Z23 NEED FOR PROPHYLACTIC VACCINATION AND INOCULATION AGAINST INFLUENZA: Primary | ICD-10-CM

## 2021-09-15 DIAGNOSIS — E87.6 HYPOKALEMIA: ICD-10-CM

## 2021-09-15 LAB
ANION GAP SERPL CALCULATED.3IONS-SCNC: 2 MMOL/L (ref 3–14)
BUN SERPL-MCNC: 31 MG/DL (ref 7–30)
CALCIUM SERPL-MCNC: 9.5 MG/DL (ref 8.5–10.1)
CHLORIDE BLD-SCNC: 104 MMOL/L (ref 94–109)
CO2 SERPL-SCNC: 29 MMOL/L (ref 20–32)
CREAT SERPL-MCNC: 1.1 MG/DL (ref 0.52–1.04)
GFR SERPL CREATININE-BSD FRML MDRD: 42 ML/MIN/1.73M2
GLUCOSE BLD-MCNC: 99 MG/DL (ref 70–99)
POTASSIUM BLD-SCNC: 4.7 MMOL/L (ref 3.4–5.3)
SODIUM SERPL-SCNC: 135 MMOL/L (ref 133–144)

## 2021-09-15 PROCEDURE — 99207 PR NO CHARGE NURSE ONLY: CPT

## 2021-09-15 PROCEDURE — 90662 IIV NO PRSV INCREASED AG IM: CPT

## 2021-09-15 PROCEDURE — G0008 ADMIN INFLUENZA VIRUS VAC: HCPCS

## 2021-09-15 PROCEDURE — 36415 COLL VENOUS BLD VENIPUNCTURE: CPT

## 2021-09-15 PROCEDURE — 80048 BASIC METABOLIC PNL TOTAL CA: CPT

## 2021-10-07 ENCOUNTER — TRANSFERRED RECORDS (OUTPATIENT)
Dept: HEALTH INFORMATION MANAGEMENT | Facility: CLINIC | Age: 86
End: 2021-10-07
Payer: MEDICARE

## 2021-10-09 ASSESSMENT — PATIENT HEALTH QUESTIONNAIRE - PHQ9
SUM OF ALL RESPONSES TO PHQ QUESTIONS 1-9: 6
SUM OF ALL RESPONSES TO PHQ QUESTIONS 1-9: 6
10. IF YOU CHECKED OFF ANY PROBLEMS, HOW DIFFICULT HAVE THESE PROBLEMS MADE IT FOR YOU TO DO YOUR WORK, TAKE CARE OF THINGS AT HOME, OR GET ALONG WITH OTHER PEOPLE: NOT DIFFICULT AT ALL

## 2021-10-10 ASSESSMENT — PATIENT HEALTH QUESTIONNAIRE - PHQ9: SUM OF ALL RESPONSES TO PHQ QUESTIONS 1-9: 6

## 2021-10-14 ENCOUNTER — OFFICE VISIT (OUTPATIENT)
Dept: FAMILY MEDICINE | Facility: CLINIC | Age: 86
End: 2021-10-14
Payer: MEDICARE

## 2021-10-14 VITALS
RESPIRATION RATE: 16 BRPM | BODY MASS INDEX: 23.56 KG/M2 | HEART RATE: 74 BPM | TEMPERATURE: 98.3 F | WEIGHT: 120 LBS | OXYGEN SATURATION: 97 % | SYSTOLIC BLOOD PRESSURE: 136 MMHG | DIASTOLIC BLOOD PRESSURE: 73 MMHG | HEIGHT: 60 IN

## 2021-10-14 DIAGNOSIS — M79.622 PAIN OF LEFT UPPER ARM: Primary | ICD-10-CM

## 2021-10-14 PROCEDURE — 99213 OFFICE O/P EST LOW 20 MIN: CPT | Performed by: PHYSICIAN ASSISTANT

## 2021-10-14 ASSESSMENT — MIFFLIN-ST. JEOR: SCORE: 850.82

## 2021-10-14 NOTE — PROGRESS NOTES
"    Assessment & Plan     Pain of left upper arm  Previous clinic visit in June and x-ray of humerus reviewed. Recommended further evaluation with orthopedics. Patient in agreement. Stop PT for now.  - Orthopedic  Referral; Future    Review of external notes as documented elsewhere in note    MARY Warren [facility] SWATI Anne is a 89 year old who presents for the following health issues  accompanied by her daughter, Marion:    Shoulder Pain    History of Present Illness       She eats 2-3 servings of fruits and vegetables daily.She consumes 1 sweetened beverage(s) daily.She exercises with enough effort to increase her heart rate 9 or less minutes per day.  She exercises with enough effort to increase her heart rate 3 or less days per week.   She is taking medications regularly.       Musculoskeletal problem/pain  Onset/Duration: for a while  Description  Location: shoulder - left  Joint Swelling: no  Redness: no  Pain: YES  Warmth: no  Intensity:  moderate  Progression of Symptoms:  same and intermittent (certain movements provoke sharp pains)  Accompanying signs and symptoms:   Fevers: no  Numbness/tingling/weakness: yes  History  Trauma to the area: no  Recent illness:  no  Previous similar problem: YES  Previous evaluation:  YES- had x-ray of humerus previously, Has been doing PT- was improving but in the last 10 days \"something has changed\" per PT  Precipitating or alleviating factors:  Aggravating factors include: lifting (reaching out, reaching above the head, abduction all painful)  Therapies tried and outcome: acetaminophen    Review of Systems   GENERAL:  No fevers  MUSCULOSKELETAL: As noted in HPI          Objective    /73 (BP Location: Right arm, Patient Position: Chair, Cuff Size: Adult Regular)   Pulse 74   Temp 98.3  F (36.8  C) (Oral)   Resp 16   Ht 1.524 m (5')   Wt 54.4 kg (120 lb)   SpO2 97%   BMI 23.44 kg/m    Body mass index " is 23.44 kg/m .  Physical Exam   GENERAL: No acute distress  HEENT: Normocephalic  VASCULAR: 2+ left radial pulse  EXTREMITIES: No tenderness over the left AC joint and clavicle, no obvious deformity.  Left upper extremity: With passive motion able to move shoulder to abduction and extension above the head without pain. Tenderness over the lateral and superior humerus/deltoid area  NEURO: Alert, non-focal          Answers for HPI/ROS submitted by the patient on 10/9/2021  If you checked off any problems, how difficult have these problems made it for you to do your work, take care of things at home, or get along with other people?: Not difficult at all  PHQ9 TOTAL SCORE: 6

## 2021-10-15 ENCOUNTER — OFFICE VISIT (OUTPATIENT)
Dept: ORTHOPEDICS | Facility: CLINIC | Age: 86
End: 2021-10-15
Payer: MEDICARE

## 2021-10-15 VITALS
SYSTOLIC BLOOD PRESSURE: 118 MMHG | HEIGHT: 60 IN | BODY MASS INDEX: 23.56 KG/M2 | DIASTOLIC BLOOD PRESSURE: 76 MMHG | WEIGHT: 120 LBS

## 2021-10-15 DIAGNOSIS — M75.102 ROTATOR CUFF TEAR ARTHROPATHY OF LEFT SHOULDER: Primary | ICD-10-CM

## 2021-10-15 DIAGNOSIS — M12.812 ROTATOR CUFF TEAR ARTHROPATHY OF LEFT SHOULDER: Primary | ICD-10-CM

## 2021-10-15 DIAGNOSIS — M79.622 PAIN OF LEFT UPPER ARM: ICD-10-CM

## 2021-10-15 PROCEDURE — 99203 OFFICE O/P NEW LOW 30 MIN: CPT | Mod: 25 | Performed by: ORTHOPAEDIC SURGERY

## 2021-10-15 PROCEDURE — 20610 DRAIN/INJ JOINT/BURSA W/O US: CPT | Mod: LT | Performed by: ORTHOPAEDIC SURGERY

## 2021-10-15 RX ORDER — DEXAMETHASONE SODIUM PHOSPHATE 4 MG/ML
2 INJECTION, SOLUTION INTRA-ARTICULAR; INTRALESIONAL; INTRAMUSCULAR; INTRAVENOUS; SOFT TISSUE
Status: DISCONTINUED | OUTPATIENT
Start: 2021-10-15 | End: 2024-04-09

## 2021-10-15 RX ORDER — LIDOCAINE HYDROCHLORIDE 10 MG/ML
4 INJECTION, SOLUTION INFILTRATION; PERINEURAL
Status: DISCONTINUED | OUTPATIENT
Start: 2021-10-15 | End: 2024-04-09

## 2021-10-15 RX ADMIN — LIDOCAINE HYDROCHLORIDE 4 ML: 10 INJECTION, SOLUTION INFILTRATION; PERINEURAL at 11:11

## 2021-10-15 RX ADMIN — DEXAMETHASONE SODIUM PHOSPHATE 2 MG: 4 INJECTION, SOLUTION INTRA-ARTICULAR; INTRALESIONAL; INTRAMUSCULAR; INTRAVENOUS; SOFT TISSUE at 11:11

## 2021-10-15 ASSESSMENT — MIFFLIN-ST. JEOR: SCORE: 850.82

## 2021-10-15 NOTE — PATIENT INSTRUCTIONS
1. Pain of left upper arm      Steroid injection of the left shoulder: subacromial space was performed today in clinic    - Would not soak in a hot tub, bath or swimming pool for 48 hours  - Ok to shower  - Ice today and only do your normal amounts of activity  - The lidocaine (what is giving you pain relief right now) will likely stop working in 1-2 hours.  You will then have pain again, similar to before you received the injection. The corticosteroid will not start working until approximately 1-2 weeks from now.  In a small percentage of people, cortisone can cause flushing/redness in the face. This usually lasts for 1-3 days and resolves. Cool compress and Ibuprofen/Tylenol can help if this happens.      Follow up with Dr. Stephenson in 6-8 weeks if not improved.     Call my office with any questions or concerns, [phone].

## 2021-10-15 NOTE — PROGRESS NOTES
HISTORY OF PRESENT ILLNESS:    Dayana Samano is a 89 year old female who is seen in consultation at the request of Tiffany Monge PA-C  for left shoulder pain. Onset of pain chronic, worsening pain over the last 10 days.  She is right hand dominant.   She is accompanied by her daughter, Marion sanford. She uses a 4 wheel walker for ambulation.     Present symptoms: left shoulder pain, pain into the upper arm.  She reports weakness of both shoudlers. Increased pain with reaching and raising above shoulder height. The left shoulder does not wake her at nighttime, or cause difficulties falling asleep.   Current pain level: 0/10, Worst pain level: 7/10   Treatments tried to this point: Tylenol, Physical Therapy   Orthopedic PMH: right shoulder rotator cuff pathology, trial of coritsone ~7 years ago.      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     History of endocarditis      Osteoporosis, unspecified      Personal history of colonic polyps     last colonoscopy 2000       Past Surgical History:   Procedure Laterality Date     FEMUR SURGERY Right 05/16/2017    nail; right intertrochanteric fracture     LAPAROSCOPIC APPENDECTOMY N/A 5/8/2020    Procedure: LAPAROSCOPIC APPENDECTOMY;  Surgeon: Spenser Kent MD;  Location: [facility] OR     ORIF ELBOW FRACTURE Left 01/01/2015    x2       Family History   Problem Relation Age of Onset     Cerebrovascular Disease Father        Social History     Socioeconomic History     Marital status:      Spouse name: Not on file     Number of children:  Not on file     Years of education: Not on file     Highest education level: Not on file   Occupational History     Not on file   Tobacco Use     Smoking status: Former Smoker     Quit date: 1990     Years since quittin.8     Smokeless tobacco: Never Used   Vaping Use     Vaping Use: Never used   Substance and Sexual Activity     Alcohol use: Yes     Comment: glass of wine with dinner-sometimes     Drug use: No     Sexual activity: Not Currently   Other Topics Concern     Not on file   Social History Narrative     Not on file     Social Determinants of Health     Financial Resource Strain:      Difficulty of Paying Living Expenses:    Food Insecurity:      Worried About Running Out of Food in the Last Year:      Ran Out of Food in the Last Year:    Transportation Needs:      Lack of Transportation (Medical):      Lack of Transportation (Non-Medical):    Physical Activity:      Days of Exercise per Week:      Minutes of Exercise per Session:    Stress:      Feeling of Stress :    Social Connections:      Frequency of Communication with Friends and Family:      Frequency of Social Gatherings with Friends and Family:      Attends [employer] Services:      Active Member of Clubs or Organizations:      Attends Club or Organization Meetings:      Marital Status:    Intimate Partner Violence:      Fear of Current or Ex-Partner:      Emotionally Abused:      Physically Abused:      Sexually Abused:        Current Outpatient Medications   Medication Sig Dispense Refill     acetaminophen (TYLENOL) 500 MG tablet Take 1-2 tablets (500-1,000 mg) by mouth every 6 hours as needed for mild pain       amoxicillin (AMOXIL) 500 MG capsule TK FOUR CS PO 1 HOUR B DAPP       aspirin (ASA) 325 MG tablet Take 325 mg by mouth daily       CALCIUM 600 + D 600-200 MG-UNIT OR TABS Take 1 tablet by mouth daily  3 MONTHS 1 YEAR     calcium carbonate 1250 (500 Ca) MG CHEW Take 500 mg by mouth       fluticasone (FLONASE) 50 MCG/ACT nasal  spray Spray 1 spray in nostril       furosemide (LASIX) 40 MG tablet Take 1 tablet (40 mg) by mouth daily 90 tablet 3     hydrocortisone 2.5 % cream        hypromellose (ARTIFICIAL TEARS) 0.5 % SOLN ophthalmic solution 1 drop every hour as needed for dry eyes       ketoconazole (NIZORAL) 2 % external shampoo        latanoprost (XALATAN) 0.005 % ophthalmic solution Place 1 drop into both eyes daily       lisinopril (ZESTRIL) 40 MG tablet TAKE 1 TABLET DAILY 90 tablet 3     metoprolol tartrate (LOPRESSOR) 100 MG tablet Take 1 tablet (100 mg) by mouth 2 times daily 180 tablet 3     multivitamin (OCUVITE) TABS tablet Take 1 tablet by mouth daily       potassium chloride ER (KLOR-CON M) 20 MEQ CR tablet Take 1 tablet (20 mEq) by mouth daily 90 tablet 3     timolol maleate (TIMOPTIC) 0.5 % ophthalmic solution Place 1 drop into both eyes every evening          Allergies   Allergen Reactions     Meperidine Nausea and Vomiting     Morphine Nausea and Vomiting     Hydromorphone Nausea and Vomiting     No Known Allergies        REVIEW OF SYSTEMS:  CONSTITUTIONAL:  NEGATIVE for fever, chills, change in weight  INTEGUMENTARY/SKIN:  NEGATIVE for worrisome rashes, moles or lesions  EYES:  Macular degeneration   ENT/MOUTH:  NEGATIVE for ear, mouth and throat problems  RESP:  NEGATIVE for significant cough or SOB  BREAST:  NEGATIVE for masses, tenderness or discharge  CV:  Hypertension   GI:  Reflux   : chronic kidney disease   MUSCULOSKELETAL:  See HPI above  NEURO:  NEGATIVE for weakness, dizziness or paresthesias  ENDOCRINE:  NEGATIVE for temperature intolerance, skin/hair changes  HEME/ALLERGY/IMMUNE:  NEGATIVE for bleeding problems  PSYCHIATRIC:  Anxiety       PHYSICAL EXAM:  /76 (BP Location: Right arm, Patient Position: Chair, Cuff Size: Adult Regular)   Ht 1.524 m (5')   Wt 54.4 kg (120 lb)   BMI 23.44 kg/m    Body mass index is 23.44 kg/m .   GENERAL APPEARANCE: healthy, alert and no distress   HEENT: No apparent  thyroid megaly. Clear sclera with normal ocular movement  RESPIRATORY: No labored breathing  SKIN: no suspicious lesions or rashes  NEURO: Normal strength and tone, mentation intact and speech normal  VASCULAR: Good pulses, and capillary refill   LYMPH: no lymphadenopathy   PSYCH:  mentation appears normal and affect normal/bright    MUSCULOSKELETAL:  Not in acute distress  She walks well with a walker    No gross deformities of the shoulders other than slight swelling in the left shoulder  Significant crepitus with a shoulder movement  Limited range of motion with abduction less than 60 degrees, bilateral  Bilateral motor weakness, more noticeable in the left shoulder especially in abduction and external rotation  Positive arm drop test, bilateral    Elbow range of motion is well-maintained  Finger motion is well-maintained  Sensation is intact     ASSESSMENT:  Chronic rotator cuff tear arthropathy, left  Most likely a similar situation in the right    PLAN:  The x-ray images of left humerus from [facility], 2021 were visualized.  Significant proximal migration of the humeral head and degenerative changes of the glenohumeral joint were pointed out.    We explained to her as well as daughter about rotator cuff tear arthropathy.  Given her age of 89, surgery was not felt to be reasonable.    For that reason, a cortisone injection was felt to be the best option for her at this point.    She tolerated the injection well.  We may consider another injection if this cortisone does not provide any relief of pain in 6 to 8 weeks.  If this provides some benefit, we may repeat the injections every 3 to 4 months.    All the questions were answered..    Imaging Interpretation:   HUMERUS LEFT TWO OR MORE VIEWS   6/10/2021 3:49 PM      HISTORY:  Left humerus pain, shoulder pain, weakness. Pain of left  humerus.     FINDINGS: Multiple old rib fractures. Spine degenerative changes.                                                                       IMPRESSION: Superior translation of the humeral head presumably  related to rotator cuff pathology. No humerus fracture identified.  Small amount of calcification/ossification adjacent to the humeral  head posteriorly, which could represent very mild rotator cuff region  calcific tendinitis/bursitis.     MARILOU JORDAN MD    Large Joint Injection/Arthocentesis: L subacromial bursa    Date/Time: 10/15/2021 11:11 AM  Performed by: Los Stephenson MD  Authorized by: Los Stephenson MD     Indications:  Pain  Needle Size:  22 G  Guidance: landmark guided    Approach:  Posterolateral  Location:  Shoulder      Site:  L subacromial bursa  Medications:  2 mg dexamethasone 4 MG/ML; 4 mL lidocaine 1 %  Outcome:  Tolerated well, no immediate complications  Procedure discussed: discussed risks, benefits, and alternatives    Consent Given by:  Patient  Timeout: timeout called immediately prior to procedure    Prep: patient was prepped and draped in usual sterile fashion            Los Stephenson MD  Department of Orthopedic Surgery        Disclaimer: This note consists of symbols derived from keyboarding, dictation and/or voice recognition software. As a result, there may be errors in the script that have gone undetected. Please consider this when interpreting information found in this chart.

## 2021-10-15 NOTE — LETTER
10/15/2021         RE: Dayana Samano  [address]  [place] [facility] [address]        Dear Colleague,    Thank you for referring your patient, Dayana Samano, to the [facility] [place]. Please see a copy of my visit note below.    HISTORY OF PRESENT ILLNESS:    Dayana Samano is a 89 year old female who is seen in consultation at the request of Tiffany Monge PA-C  for left shoulder pain. Onset of pain chronic, worsening pain over the last 10 days.  She is right hand dominant.   She is accompanied by her daughter, Marion sanford. She uses a 4 wheel walker for ambulation.     Present symptoms: left shoulder pain, pain into the upper arm.  She reports weakness of both shoudlers. Increased pain with reaching and raising above shoulder height. The left shoulder does not wake her at nighttime, or cause difficulties falling asleep.   Current pain level: 0/10, Worst pain level: 7/10   Treatments tried to this point: Tylenol, Physical Therapy   Orthopedic PMH: right shoulder rotator cuff pathology, trial of coritsone ~7 years ago.      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     History of endocarditis      Osteoporosis, unspecified      Personal history of colonic polyps     last colonoscopy 2000       Past Surgical History:   Procedure Laterality Date     FEMUR SURGERY Right 05/16/2017    nail; right intertrochanteric fracture     LAPAROSCOPIC APPENDECTOMY N/A 5/8/2020    Procedure: LAPAROSCOPIC APPENDECTOMY;  Surgeon: Spenser Kent MD;   Location: [facility] OR     ORIF ELBOW FRACTURE Left 01/01/2015    x2       Family History   Problem Relation Age of Onset     Cerebrovascular Disease Father        Social History     Socioeconomic History     Marital status:      Spouse name: Not on file     Number of children: Not on file     Years of education: Not on file     Highest education level: Not on file   Occupational History     Not on file   Tobacco Use     Smoking status: Former Smoker     Quit date: 1990     Years since quittin.8     Smokeless tobacco: Never Used   Vaping Use     Vaping Use: Never used   Substance and Sexual Activity     Alcohol use: Yes     Comment: glass of wine with dinner-sometimes     Drug use: No     Sexual activity: Not Currently   Other Topics Concern     Not on file   Social History Narrative     Not on file     Social Determinants of Health     Financial Resource Strain:      Difficulty of Paying Living Expenses:    Food Insecurity:      Worried About Running Out of Food in the Last Year:      Ran Out of Food in the Last Year:    Transportation Needs:      Lack of Transportation (Medical):      Lack of Transportation (Non-Medical):    Physical Activity:      Days of Exercise per Week:      Minutes of Exercise per Session:    Stress:      Feeling of Stress :    Social Connections:      Frequency of Communication with Friends and Family:      Frequency of Social Gatherings with Friends and Family:      Attends [employer] Services:      Active Member of Clubs or Organizations:      Attends Club or Organization Meetings:      Marital Status:    Intimate Partner Violence:      Fear of Current or Ex-Partner:      Emotionally Abused:      Physically Abused:      Sexually Abused:        Current Outpatient Medications   Medication Sig Dispense Refill     acetaminophen (TYLENOL) 500 MG tablet Take 1-2 tablets (500-1,000 mg) by mouth every 6 hours as needed for mild pain       amoxicillin (AMOXIL) 500 MG capsule TK FOUR CS PO  1 HOUR B DAPP       aspirin (ASA) 325 MG tablet Take 325 mg by mouth daily       CALCIUM 600 + D 600-200 MG-UNIT OR TABS Take 1 tablet by mouth daily  3 MONTHS 1 YEAR     calcium carbonate 1250 (500 Ca) MG CHEW Take 500 mg by mouth       fluticasone (FLONASE) 50 MCG/ACT nasal spray Spray 1 spray in nostril       furosemide (LASIX) 40 MG tablet Take 1 tablet (40 mg) by mouth daily 90 tablet 3     hydrocortisone 2.5 % cream        hypromellose (ARTIFICIAL TEARS) 0.5 % SOLN ophthalmic solution 1 drop every hour as needed for dry eyes       ketoconazole (NIZORAL) 2 % external shampoo        latanoprost (XALATAN) 0.005 % ophthalmic solution Place 1 drop into both eyes daily       lisinopril (ZESTRIL) 40 MG tablet TAKE 1 TABLET DAILY 90 tablet 3     metoprolol tartrate (LOPRESSOR) 100 MG tablet Take 1 tablet (100 mg) by mouth 2 times daily 180 tablet 3     multivitamin (OCUVITE) TABS tablet Take 1 tablet by mouth daily       potassium chloride ER (KLOR-CON M) 20 MEQ CR tablet Take 1 tablet (20 mEq) by mouth daily 90 tablet 3     timolol maleate (TIMOPTIC) 0.5 % ophthalmic solution Place 1 drop into both eyes every evening          Allergies   Allergen Reactions     Meperidine Nausea and Vomiting     Morphine Nausea and Vomiting     Hydromorphone Nausea and Vomiting     No Known Allergies        REVIEW OF SYSTEMS:  CONSTITUTIONAL:  NEGATIVE for fever, chills, change in weight  INTEGUMENTARY/SKIN:  NEGATIVE for worrisome rashes, moles or lesions  EYES:  Macular degeneration   ENT/MOUTH:  NEGATIVE for ear, mouth and throat problems  RESP:  NEGATIVE for significant cough or SOB  BREAST:  NEGATIVE for masses, tenderness or discharge  CV:  Hypertension   GI:  Reflux   : chronic kidney disease   MUSCULOSKELETAL:  See HPI above  NEURO:  NEGATIVE for weakness, dizziness or paresthesias  ENDOCRINE:  NEGATIVE for temperature intolerance, skin/hair changes  HEME/ALLERGY/IMMUNE:  NEGATIVE for bleeding problems  PSYCHIATRIC:  Anxiety        PHYSICAL EXAM:  /76 (BP Location: Right arm, Patient Position: Chair, Cuff Size: Adult Regular)   Ht 1.524 m (5')   Wt 54.4 kg (120 lb)   BMI 23.44 kg/m    Body mass index is 23.44 kg/m .   GENERAL APPEARANCE: healthy, alert and no distress   HEENT: No apparent thyroid megaly. Clear sclera with normal ocular movement  RESPIRATORY: No labored breathing  SKIN: no suspicious lesions or rashes  NEURO: Normal strength and tone, mentation intact and speech normal  VASCULAR: Good pulses, and capillary refill   LYMPH: no lymphadenopathy   PSYCH:  mentation appears normal and affect normal/bright    MUSCULOSKELETAL:  Not in acute distress  She walks well with a walker    No gross deformities of the shoulders other than slight swelling in the left shoulder  Significant crepitus with a shoulder movement  Limited range of motion with abduction less than 60 degrees, bilateral  Bilateral motor weakness, more noticeable in the left shoulder especially in abduction and external rotation  Positive arm drop test, bilateral    Elbow range of motion is well-maintained  Finger motion is well-maintained  Sensation is intact     ASSESSMENT:  Chronic rotator cuff tear arthropathy, left  Most likely a similar situation in the right    PLAN:  The x-ray images of left humerus from [facility], 2021 were visualized.  Significant proximal migration of the humeral head and degenerative changes of the glenohumeral joint were pointed out.    We explained to her as well as daughter about rotator cuff tear arthropathy.  Given her age of 89, surgery was not felt to be reasonable.    For that reason, a cortisone injection was felt to be the best option for her at this point.    She tolerated the injection well.  We may consider another injection if this cortisone does not provide any relief of pain in 6 to 8 weeks.  If this provides some benefit, we may repeat the injections every 3 to 4 months.    All the questions were  answered..    Imaging Interpretation:   HUMERUS LEFT TWO OR MORE VIEWS   6/10/2021 3:49 PM      HISTORY:  Left humerus pain, shoulder pain, weakness. Pain of left  humerus.     FINDINGS: Multiple old rib fractures. Spine degenerative changes.                                                                      IMPRESSION: Superior translation of the humeral head presumably  related to rotator cuff pathology. No humerus fracture identified.  Small amount of calcification/ossification adjacent to the humeral  head posteriorly, which could represent very mild rotator cuff region  calcific tendinitis/bursitis.     MARILOU JORDAN MD    Large Joint Injection/Arthocentesis: L subacromial bursa    Date/Time: 10/15/2021 11:11 AM  Performed by: Los Stephenson MD  Authorized by: Los Stephenson MD     Indications:  Pain  Needle Size:  22 G  Guidance: landmark guided    Approach:  Posterolateral  Location:  Shoulder      Site:  L subacromial bursa  Medications:  2 mg dexamethasone 4 MG/ML; 4 mL lidocaine 1 %  Outcome:  Tolerated well, no immediate complications  Procedure discussed: discussed risks, benefits, and alternatives    Consent Given by:  Patient  Timeout: timeout called immediately prior to procedure    Prep: patient was prepped and draped in usual sterile fashion            Los Stephenson MD  Department of Orthopedic Surgery        Disclaimer: This note consists of symbols derived from keyboarding, dictation and/or voice recognition software. As a result, there may be errors in the script that have gone undetected. Please consider this when interpreting information found in this chart.        Again, thank you for allowing me to participate in the care of your patient.        Sincerely,        Los Stephenson MD

## 2021-10-25 ENCOUNTER — TRANSFERRED RECORDS (OUTPATIENT)
Dept: HEALTH INFORMATION MANAGEMENT | Facility: CLINIC | Age: 86
End: 2021-10-25
Payer: MEDICARE

## 2021-12-18 ENCOUNTER — MEDICAL CORRESPONDENCE (OUTPATIENT)
Dept: HEALTH INFORMATION MANAGEMENT | Facility: CLINIC | Age: 86
End: 2021-12-18

## 2022-01-08 ENCOUNTER — HEALTH MAINTENANCE LETTER (OUTPATIENT)
Age: 87
End: 2022-01-08

## 2022-01-18 ENCOUNTER — MEDICAL CORRESPONDENCE (OUTPATIENT)
Dept: HEALTH INFORMATION MANAGEMENT | Facility: CLINIC | Age: 87
End: 2022-01-18
Payer: MEDICARE

## 2022-02-04 ENCOUNTER — TELEPHONE (OUTPATIENT)
Dept: FAMILY MEDICINE | Facility: CLINIC | Age: 87
End: 2022-02-04
Payer: MEDICARE

## 2022-02-04 NOTE — TELEPHONE ENCOUNTER
Pts daughter called to let you know pt is taking tylenol arthritis every 8 hours for arthritis pain. The in home nurse wanted pt to report this to you since she had been using it longer than 10 days and continues to do so for longer, unless told otherwise by provider. If we need to call please call daughter Marion at [phone].    SAUL Cordova

## 2022-02-07 ENCOUNTER — MEDICAL CORRESPONDENCE (OUTPATIENT)
Dept: HEALTH INFORMATION MANAGEMENT | Facility: CLINIC | Age: 87
End: 2022-02-07
Payer: MEDICARE

## 2022-02-28 ENCOUNTER — TRANSFERRED RECORDS (OUTPATIENT)
Dept: HEALTH INFORMATION MANAGEMENT | Facility: CLINIC | Age: 87
End: 2022-02-28
Payer: MEDICARE

## 2022-04-01 ENCOUNTER — TRANSFERRED RECORDS (OUTPATIENT)
Dept: HEALTH INFORMATION MANAGEMENT | Facility: CLINIC | Age: 87
End: 2022-04-01
Payer: MEDICARE

## 2022-04-07 ENCOUNTER — TRANSFERRED RECORDS (OUTPATIENT)
Dept: HEALTH INFORMATION MANAGEMENT | Facility: CLINIC | Age: 87
End: 2022-04-07
Payer: MEDICARE

## 2022-04-30 ENCOUNTER — HEALTH MAINTENANCE LETTER (OUTPATIENT)
Age: 87
End: 2022-04-30

## 2022-05-18 ENCOUNTER — TRANSFERRED RECORDS (OUTPATIENT)
Dept: HEALTH INFORMATION MANAGEMENT | Facility: CLINIC | Age: 87
End: 2022-05-18
Payer: MEDICARE

## 2022-06-03 ENCOUNTER — MEDICAL CORRESPONDENCE (OUTPATIENT)
Dept: FAMILY MEDICINE | Facility: CLINIC | Age: 87
End: 2022-06-03

## 2022-07-12 ENCOUNTER — OFFICE VISIT (OUTPATIENT)
Dept: FAMILY MEDICINE | Facility: CLINIC | Age: 87
End: 2022-07-12
Payer: MEDICARE

## 2022-07-12 VITALS
OXYGEN SATURATION: 95 % | DIASTOLIC BLOOD PRESSURE: 70 MMHG | BODY MASS INDEX: 23.05 KG/M2 | WEIGHT: 118 LBS | TEMPERATURE: 97.5 F | RESPIRATION RATE: 14 BRPM | HEART RATE: 72 BPM | SYSTOLIC BLOOD PRESSURE: 102 MMHG

## 2022-07-12 DIAGNOSIS — L21.9 SEBORRHEIC DERMATITIS: ICD-10-CM

## 2022-07-12 DIAGNOSIS — I10 ESSENTIAL HYPERTENSION, BENIGN: Primary | ICD-10-CM

## 2022-07-12 DIAGNOSIS — N18.31 STAGE 3A CHRONIC KIDNEY DISEASE (H): ICD-10-CM

## 2022-07-12 DIAGNOSIS — I50.9 CHRONIC CONGESTIVE HEART FAILURE, UNSPECIFIED HEART FAILURE TYPE (H): ICD-10-CM

## 2022-07-12 DIAGNOSIS — D69.6 THROMBOCYTOPENIA (H): ICD-10-CM

## 2022-07-12 LAB
ALBUMIN UR-MCNC: NEGATIVE MG/DL
APPEARANCE UR: CLEAR
BACTERIA #/AREA URNS HPF: ABNORMAL /HPF
BILIRUB UR QL STRIP: NEGATIVE
COLOR UR AUTO: YELLOW
ERYTHROCYTE [DISTWIDTH] IN BLOOD BY AUTOMATED COUNT: 11.9 % (ref 10–15)
GLUCOSE UR STRIP-MCNC: NEGATIVE MG/DL
HCT VFR BLD AUTO: 39.7 % (ref 35–47)
HGB BLD-MCNC: 13.1 G/DL (ref 11.7–15.7)
HGB UR QL STRIP: NEGATIVE
HYALINE CASTS #/AREA URNS LPF: ABNORMAL /LPF
KETONES UR STRIP-MCNC: NEGATIVE MG/DL
LEUKOCYTE ESTERASE UR QL STRIP: ABNORMAL
MCH RBC QN AUTO: 34.6 PG (ref 26.5–33)
MCHC RBC AUTO-ENTMCNC: 33 G/DL (ref 31.5–36.5)
MCV RBC AUTO: 105 FL (ref 78–100)
NITRATE UR QL: NEGATIVE
PH UR STRIP: 7 [PH] (ref 5–7)
PLATELET # BLD AUTO: 143 10E3/UL (ref 150–450)
RBC # BLD AUTO: 3.79 10E6/UL (ref 3.8–5.2)
RBC #/AREA URNS AUTO: ABNORMAL /HPF
SP GR UR STRIP: 1.02 (ref 1–1.03)
SQUAMOUS #/AREA URNS AUTO: ABNORMAL /LPF
UROBILINOGEN UR STRIP-ACNC: 0.2 E.U./DL
WBC # BLD AUTO: 5.1 10E3/UL (ref 4–11)
WBC #/AREA URNS AUTO: ABNORMAL /HPF

## 2022-07-12 PROCEDURE — 82043 UR ALBUMIN QUANTITATIVE: CPT | Performed by: FAMILY MEDICINE

## 2022-07-12 PROCEDURE — 81001 URINALYSIS AUTO W/SCOPE: CPT | Performed by: FAMILY MEDICINE

## 2022-07-12 PROCEDURE — 99214 OFFICE O/P EST MOD 30 MIN: CPT | Performed by: FAMILY MEDICINE

## 2022-07-12 PROCEDURE — 36415 COLL VENOUS BLD VENIPUNCTURE: CPT | Performed by: FAMILY MEDICINE

## 2022-07-12 PROCEDURE — 85027 COMPLETE CBC AUTOMATED: CPT | Performed by: FAMILY MEDICINE

## 2022-07-12 PROCEDURE — 80053 COMPREHEN METABOLIC PANEL: CPT | Performed by: FAMILY MEDICINE

## 2022-07-12 RX ORDER — FUROSEMIDE 40 MG
40 TABLET ORAL DAILY
Qty: 90 TABLET | Refills: 3 | Status: SHIPPED | OUTPATIENT
Start: 2022-07-12 | End: 2023-08-02

## 2022-07-12 RX ORDER — LISINOPRIL 40 MG/1
40 TABLET ORAL DAILY
Qty: 90 TABLET | Refills: 3 | Status: SHIPPED | OUTPATIENT
Start: 2022-07-12 | End: 2023-07-20

## 2022-07-12 RX ORDER — POTASSIUM CHLORIDE 1500 MG/1
20 TABLET, EXTENDED RELEASE ORAL DAILY
Qty: 90 TABLET | Refills: 3 | Status: SHIPPED | OUTPATIENT
Start: 2022-07-12 | End: 2023-06-29

## 2022-07-12 RX ORDER — KETOCONAZOLE 20 MG/G
CREAM TOPICAL DAILY
Qty: 30 G | Refills: 1 | Status: SHIPPED | OUTPATIENT
Start: 2022-07-12 | End: 2022-11-04

## 2022-07-12 RX ORDER — METOPROLOL TARTRATE 100 MG
100 TABLET ORAL 2 TIMES DAILY
Qty: 180 TABLET | Refills: 3 | Status: SHIPPED | OUTPATIENT
Start: 2022-07-12 | End: 2023-07-20

## 2022-07-12 ASSESSMENT — PATIENT HEALTH QUESTIONNAIRE - PHQ9
10. IF YOU CHECKED OFF ANY PROBLEMS, HOW DIFFICULT HAVE THESE PROBLEMS MADE IT FOR YOU TO DO YOUR WORK, TAKE CARE OF THINGS AT HOME, OR GET ALONG WITH OTHER PEOPLE: NOT DIFFICULT AT ALL
SUM OF ALL RESPONSES TO PHQ QUESTIONS 1-9: 9
SUM OF ALL RESPONSES TO PHQ QUESTIONS 1-9: 9

## 2022-07-12 NOTE — LETTER
My Heart Failure Action Plan   Name: Dayana Samano    YOB: 1924   Date: 7/12/2022    My doctor: Jim Mcdaniel April [address] [address] [phone]  My Diagnosis: Chronic Congestive Heart Failure, unspecified   My Ejection Fraction: No results found for: LVEF     My Exercise Goal: 30 minutes daily     My Weight Plan:   Wt Readings from Last 2 Encounters:   07/12/22 53.5 kg (118 lb)   10/15/21 54.4 kg (120 lb)     Weigh yourself daily using the same scale. If you gain more than 3 pounds in 24 hours or 5 pounds in a week call the clinic    My Diet Goal: No added salt    Emergency Room Visits:    Our goal is to improve your quality of life and help you avoid a visit to the emergency room or hospital.  If we work together, we can achieve this goal. But, if you feel you need to call 911 or go to the emergency room, please do so.  If you go to the emergency room, please bring your list of medicines and your daily weight chart with you.       GREEN ZONE     Doing well today    Weight gained is no more than 2 pounds a day or 5 pounds a week.    No swelling in feet, ankles, legs or stomach.    No more swelling than usual.    No more trouble breathing than usual.    No change in my sleep.    No other problems. Actions:    I am doing fine.  I will take my medicine, follow my diet, see my doctor, exercise, and watch for symptoms.           YELLOW ZONE         Having a bad day or flare up    Weight gain of more than 2 pounds in one day or 5 pounds in one week.    New swelling in ankle, leg, knee or thigh.    Bloating in belly, pants feel tighter.    Swelling in hands or face.    Coughing or trouble breathing while walking or talking.    Harder to breathe last night.    Have trouble sleeping, wake up short of breath.    Much more tired than usual.    Not eating.    Pain in my chest or bad leg cramps.    Feel weak or dizzy.  Actions:    I need to take action and call my doctor or nurse today.                 RED ZONE         Need medical care now    Weight gain of 5 pounds overnight.    Chest pain or pressure that does not go away.    Feel less alert.    Wheezing or have trouble breathing when at rest.    Cannot sleep lying down.    Cannot take my water pill.    Pass out or faint. Actions:    I need to call my doctor or nurse now!    Call 911 if I have chest pain or cannot breathe.

## 2022-07-12 NOTE — PROGRESS NOTES
Assessment & Plan     Essential hypertension, benign  Well controlled.  Due for labs.  Refill medications.  - Comprehensive metabolic panel (BMP + Alb, Alk Phos, ALT, AST, Total. Bili, TP)  - CBC with platelets  - lisinopril (ZESTRIL) 40 MG tablet; Take 1 tablet (40 mg) by mouth daily  - metoprolol tartrate (LOPRESSOR) 100 MG tablet; Take 1 tablet (100 mg) by mouth 2 times daily    Stage 3a chronic kidney disease (H)  Due for labs, continue to monitor  - Albumin Random Urine Quantitative with Creat Ratio  - UA Macro with Reflex to Micro and Culture - lab collect  - Urine Microscopic    Thrombocytopenia (H)  Chronic, due for labs.  - CBC with platelets    Seborrheic dermatitis  As needed  - ketoconazole (NIZORAL) 2 % external cream; Apply topically daily    Chronic congestive heart failure, unspecified heart failure type (H)  I do not see an ECHO in her chart that states whether or not she has systolic or diastolic, but daughter is aware of the weight changes that require us to be notified as part of her HF action plan, so this must have been discussed with them previously.  Continue current medications.  - furosemide (LASIX) 40 MG tablet; Take 1 tablet (40 mg) by mouth daily  - lisinopril (ZESTRIL) 40 MG tablet; Take 1 tablet (40 mg) by mouth daily  - metoprolol tartrate (LOPRESSOR) 100 MG tablet; Take 1 tablet (100 mg) by mouth 2 times daily  - potassium chloride ER (KLOR-CON M) 20 MEQ CR tablet; Take 1 tablet (20 mEq) by mouth daily  - HEART FAILURE ACTION PLAN      33 minutes spent on the date of the encounter doing chart review, history and exam, documentation and further activities per the note       See Patient Instructions    Return in about 6 months (around 1/12/2023).    MD CEFERINO Deng [facility] SWATI Anne is a 89 year old accompanied by her daughter, presenting for the following health issues:  Recheck Medication      History of Present Illness        Reason for visit:  Renew prescriptions    She eats 2-3 servings of fruits and vegetables daily.She consumes 1 sweetened beverage(s) daily.She exercises with enough effort to increase her heart rate 9 or less minutes per day.  She exercises with enough effort to increase her heart rate 7 days per week.   She is taking medications regularly.    Today's PHQ-9         PHQ-9 Total Score: 9    PHQ-9 Q9 Thoughts of better off dead/self-harm past 2 weeks :   Not at all    How difficult have these problems made it for you to do your work, take care of things at home, or get along with other people: Not difficult at all       Hypertension Follow-up      Do you check your blood pressure regularly outside of the clinic? Yes     Are you following a low salt diet? No    Are your blood pressures ever more than 140 on the top number (systolic) OR more   than 90 on the bottom number (diastolic), for example 140/90? No    BP is high today, on first check at 171/77.  Recheck was normal.  Needs to get labs done to get her medications refilled.    Review of Systems   Constitutional, HEENT, cardiovascular, pulmonary, gi and gu systems are negative, except as otherwise noted.      Objective    /70 (BP Location: Right arm, Patient Position: Chair, Cuff Size: Adult Regular)   Pulse 72   Temp 97.5  F (36.4  C) (Oral)   Resp 14   Wt 53.5 kg (118 lb)   SpO2 95%   BMI 23.05 kg/m    Body mass index is 23.05 kg/m .  Physical Exam   GENERAL: healthy, alert and no distress  RESP: lungs clear to auscultation - no rales, rhonchi or wheezes  CV: regular rate and rhythm    Labs reviewed in chart              .  ..

## 2022-07-13 DIAGNOSIS — R71.8 ELEVATED MCV: Primary | ICD-10-CM

## 2022-07-13 LAB
ALBUMIN SERPL-MCNC: 4.3 G/DL (ref 3.4–5)
ALP SERPL-CCNC: 93 U/L (ref 40–150)
ALT SERPL W P-5'-P-CCNC: 19 U/L (ref 0–50)
ANION GAP SERPL CALCULATED.3IONS-SCNC: 2 MMOL/L (ref 3–14)
AST SERPL W P-5'-P-CCNC: 16 U/L (ref 0–45)
BILIRUB SERPL-MCNC: 0.6 MG/DL (ref 0.2–1.3)
BUN SERPL-MCNC: 30 MG/DL (ref 7–30)
CALCIUM SERPL-MCNC: 10.8 MG/DL (ref 8.5–10.1)
CHLORIDE BLD-SCNC: 103 MMOL/L (ref 94–109)
CO2 SERPL-SCNC: 33 MMOL/L (ref 20–32)
CREAT SERPL-MCNC: 0.87 MG/DL (ref 0.52–1.04)
CREAT UR-MCNC: 69 MG/DL
GFR SERPL CREATININE-BSD FRML MDRD: 60 ML/MIN/1.73M2
GLUCOSE BLD-MCNC: 116 MG/DL (ref 70–99)
MICROALBUMIN UR-MCNC: 23 MG/L
MICROALBUMIN/CREAT UR: 33.33 MG/G CR (ref 0–25)
POTASSIUM BLD-SCNC: 4.5 MMOL/L (ref 3.4–5.3)
PROT SERPL-MCNC: 7.2 G/DL (ref 6.8–8.8)
SODIUM SERPL-SCNC: 138 MMOL/L (ref 133–144)

## 2022-08-10 ENCOUNTER — OFFICE VISIT (OUTPATIENT)
Dept: URGENT CARE | Facility: URGENT CARE | Age: 87
End: 2022-08-10
Payer: MEDICARE

## 2022-08-10 ENCOUNTER — NURSE TRIAGE (OUTPATIENT)
Dept: FAMILY MEDICINE | Facility: CLINIC | Age: 87
End: 2022-08-10

## 2022-08-10 VITALS
TEMPERATURE: 98 F | HEART RATE: 78 BPM | RESPIRATION RATE: 20 BRPM | DIASTOLIC BLOOD PRESSURE: 78 MMHG | OXYGEN SATURATION: 98 % | SYSTOLIC BLOOD PRESSURE: 157 MMHG

## 2022-08-10 DIAGNOSIS — R35.0 URINARY FREQUENCY: Primary | ICD-10-CM

## 2022-08-10 DIAGNOSIS — N39.0 URINARY TRACT INFECTION WITHOUT HEMATURIA, SITE UNSPECIFIED: ICD-10-CM

## 2022-08-10 DIAGNOSIS — R41.3 MEMORY LOSS: ICD-10-CM

## 2022-08-10 LAB
ALBUMIN UR-MCNC: NEGATIVE MG/DL
APPEARANCE UR: CLEAR
BACTERIA #/AREA URNS HPF: ABNORMAL /HPF
BILIRUB UR QL STRIP: NEGATIVE
COLOR UR AUTO: YELLOW
GLUCOSE UR STRIP-MCNC: NEGATIVE MG/DL
HGB UR QL STRIP: ABNORMAL
KETONES UR STRIP-MCNC: NEGATIVE MG/DL
LEUKOCYTE ESTERASE UR QL STRIP: ABNORMAL
NITRATE UR QL: NEGATIVE
PH UR STRIP: 7 [PH] (ref 5–7)
RBC #/AREA URNS AUTO: ABNORMAL /HPF
SP GR UR STRIP: 1.01 (ref 1–1.03)
SQUAMOUS #/AREA URNS AUTO: ABNORMAL /LPF
UROBILINOGEN UR STRIP-ACNC: 0.2 E.U./DL
WBC #/AREA URNS AUTO: ABNORMAL /HPF

## 2022-08-10 PROCEDURE — 81001 URINALYSIS AUTO W/SCOPE: CPT | Performed by: FAMILY MEDICINE

## 2022-08-10 PROCEDURE — 99214 OFFICE O/P EST MOD 30 MIN: CPT | Performed by: FAMILY MEDICINE

## 2022-08-10 PROCEDURE — 87086 URINE CULTURE/COLONY COUNT: CPT | Performed by: FAMILY MEDICINE

## 2022-08-10 RX ORDER — CEFDINIR 300 MG/1
300 CAPSULE ORAL 2 TIMES DAILY
Qty: 10 CAPSULE | Refills: 0 | Status: SHIPPED | OUTPATIENT
Start: 2022-08-10 | End: 2022-08-15

## 2022-08-10 NOTE — PROGRESS NOTES
SUBJECTIVE:  Chief Complaint   Patient presents with     Memory Loss     Memory loss/ pt is hearing music in her ears      Dayana Samano is a 89 year old female who presents with a chief complaint of memory loss.    Reviewed triage nursing note:  Second Level Triage with April Coming MD Jonas who recommends Urgent Care for evaluation.      S: Memory loss  B: Patient reported symptoms started 4d ago.  A: Trouble remembering her three daughter's phone numbers. Trouble writing the numbers on paper. Trouble reading what she wrote.  Smile is symmetrical, hand grasps are equal, tongue does not deviate,      Denies: fever, headache, fall, injury, changes in bowel or bladder habits, facial drooping, change in gait     R: Patient reports her memory today is no better.     Daughter will bring patient to [facility] [place].     No fever.  Denies dysuria symptoms  No history of recurrent bladder infection.    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     History of endocarditis      Osteoporosis, unspecified      Personal history of colonic polyps     last colonoscopy 2000     Current Outpatient Medications   Medication Sig Dispense Refill     acetaminophen (TYLENOL) 500 MG tablet Take 1-2 tablets (500-1,000 mg) by mouth every 6 hours as needed for mild pain       amoxicillin (AMOXIL) 500 MG capsule TK FOUR CS PO 1 HOUR B DAPP       aspirin (ASA) 325 MG tablet Take 325 mg by mouth daily       CALCIUM 600 + D 600-200 MG-UNIT OR TABS  Take 1 tablet by mouth daily  3 MONTHS 1 YEAR     calcium carbonate 1250 (500 Ca) MG CHEW Take 500 mg by mouth       cefdinir (OMNICEF) 300 MG capsule Take 1 capsule (300 mg) by mouth 2 times daily for 5 days 10 capsule 0     fluticasone (FLONASE) 50 MCG/ACT nasal spray Spray 1 spray in nostril       furosemide (LASIX) 40 MG tablet Take 1 tablet (40 mg) by mouth daily 90 tablet 3     hydrocortisone 2.5 % cream        hypromellose (ARTIFICIAL TEARS) 0.5 % SOLN ophthalmic solution 1 drop every hour as needed for dry eyes       ketoconazole (NIZORAL) 2 % external cream Apply topically daily 30 g 1     ketoconazole (NIZORAL) 2 % external shampoo        latanoprost (XALATAN) 0.005 % ophthalmic solution Place 1 drop into both eyes daily       lisinopril (ZESTRIL) 40 MG tablet Take 1 tablet (40 mg) by mouth daily 90 tablet 3     metoprolol tartrate (LOPRESSOR) 100 MG tablet Take 1 tablet (100 mg) by mouth 2 times daily 180 tablet 3     multivitamin (OCUVITE) TABS tablet Take 1 tablet by mouth daily       potassium chloride ER (KLOR-CON M) 20 MEQ CR tablet Take 1 tablet (20 mEq) by mouth daily 90 tablet 3     timolol maleate (TIMOPTIC) 0.5 % ophthalmic solution Place 1 drop into both eyes every evening        Social History     Tobacco Use     Smoking status: Former Smoker     Quit date: 1990     Years since quittin.6     Smokeless tobacco: Never Used   Substance Use Topics     Alcohol use: Yes     Comment: glass of wine with dinner-sometimes       ROS:  Review of systems negative except as stated above.    EXAM:   BP (!) 157/78   Pulse 78   Temp 98  F (36.7  C)   Resp 20   SpO2 98%   GENERAL APPEARANCE: healthy, alert and no distress  PSYCH:alert, affect bright    Results for orders placed or performed in visit on 08/10/22   UA reflex to Microscopic and Culture     Status: Abnormal    Specimen: Urine, Midstream   Result Value Ref Range    Color Urine Yellow Colorless, Straw, Light Yellow, Yellow    Appearance  Urine Clear Clear    Glucose Urine Negative Negative mg/dL    Bilirubin Urine Negative Negative    Ketones Urine Negative Negative mg/dL    Specific Gravity Urine 1.015 1.003 - 1.035    Blood Urine Large (A) Negative    pH Urine 7.0 5.0 - 7.0    Protein Albumin Urine Negative Negative mg/dL    Urobilinogen Urine 0.2 0.2, 1.0 E.U./dL    Nitrite Urine Negative Negative    Leukocyte Esterase Urine Trace (A) Negative   Urine Microscopic     Status: Abnormal   Result Value Ref Range    Bacteria Urine Many (A) None Seen /HPF    RBC Urine  (A) 0-2 /HPF /HPF    WBC Urine 25-50 (A) 0-5 /HPF /HPF    Squamous Epithelials Urine Few (A) None Seen /LPF       ASSESSMENT/PLAN:  (R35.0) Urinary frequency  (primary encounter diagnosis)  Plan: UA reflex to Microscopic and Culture, Urine         Microscopic, Urine Culture, cefdinir (OMNICEF)         300 MG capsule            (N39.0) Urinary tract infection without hematuria, site unspecified  Plan: cefdinir (OMNICEF) 300 MG capsule            (R41.3) Memory loss  Plan: monitor, follow up with primary provider      Discussed memory and auditory concerns, that if requires further evaluation that would need to be thru primary provider in setting of prior CVA.  Reviewed that ER evaluation warranted if symptoms was acute, ie first day.    Discussed that infection can cause confusion/memory concerns, RX Omnicef given for empiric treatment for presumptive UTI.  Will follow up on urine culture and adjust medication if needed.  Encourage to drink plenty of fluids    Follow up with primary provider if no improvement of symptoms in 1 week    Jose Guadalupe De La Cruz MD  August 10, 2022 1:07 PM

## 2022-08-10 NOTE — PATIENT INSTRUCTIONS
Take full course of antibiotic - Omnicef - twice a day for 5 days  We will contact you if any changes are needed once the urine culture is finalized in 2-3 days    Do not take calcium supplements while on antibiotic - the calcium blocks the absorption of the antibiotic    Follow up with your primary provider if memory loss or auditory changes persists.

## 2022-08-10 NOTE — TELEPHONE ENCOUNTER
Second Level Triage with April Coming MD Jonas who recommends Urgent Care for evaluation.     S: Memory loss    B: Patient reported symptoms started 4d ago.    A: Trouble remembering her three daughter's phone numbers. Trouble writing the numbers on paper. Trouble reading what she wrote.  Smile is symmetrical, hand grasps are equal, tongue does not deviate,     Denies: fever, headache, fall, injury, changes in bowel or bladder habits, facial drooping, change in gait    R: Patient reports her memory today is no better.    Daughter will bring patient to [facility] [place].    Alexa Ash RN

## 2022-08-12 LAB — BACTERIA UR CULT: NORMAL

## 2022-08-18 ENCOUNTER — NURSE TRIAGE (OUTPATIENT)
Dept: NURSING | Facility: CLINIC | Age: 87
End: 2022-08-18

## 2022-08-18 NOTE — TELEPHONE ENCOUNTER
Triage call  Daughter is calling to report knees are getting old and creek difficulty in  Lifting up her knees to walk and is having more weakness. Daughter wondering what more that could be done for her.  Mother there and gave permission for Daughter to talk about her health care      Per protocol see in office in 3 days.  Care advice given.  Verbalizes understanding and agrees with plan.  Daughter is going to call the orthopedic office to make a appointment.    Joselin Hi RN   [facility] Nurse Advisor  2:02 PM 8/18/2022    Reason for Disposition    MODERATE pain (e.g., symptoms interfere with work or school, limping) and present > 3 days    Additional Information    Negative: Sounds like a life-threatening emergency to the triager    Negative: Followed a knee injury    Negative: Swollen knee joint and fever    Negative: Thigh or calf pain and only 1 side and present > 1 hour    Negative: Thigh or calf swelling and only 1 side    Negative: Patient sounds very sick or weak to the triager    Negative: Can't move swollen joint at all    Negative: SEVERE pain (e.g., excruciating, unable to walk)    Negative: Very swollen knee joint    Negative: Painful rash with multiple small blisters grouped together (i.e., dermatomal distribution or 'band' or 'stripe')    Negative: Looks like a boil, infected sore, deep ulcer, or other infected rash (spreading redness, pus)    Protocols used: KNEE PAIN-A-OH

## 2022-08-18 NOTE — TELEPHONE ENCOUNTER
Called daughter, see other call, has orthopedic appointment scheduled  Marion Win RN, BSN  [facility]

## 2022-08-18 NOTE — TELEPHONE ENCOUNTER
Consent on file to speak with pts daughter Ludin     S: Sore knees    B: Pts daughter ludin calling and wanted you to know that Dayana has stated that her knees feel weak and sore. Writer unable to triage because Ludin was not with the patient. Ludin wanted you to know that she is taking Tylenol and is wondering if there is something else she can take.     A: Sore knees      R: Advised I would send a general FYI over to you guys for her. Please call her daughter back if you have further recommendations.    LIBORIO PROCTOR RN          Reason for Disposition    Nursing judgment    Additional Information    Negative: Nursing judgment    Negative: Nursing judgment    Negative: Nursing judgment    Protocols used: INFORMATION ONLY CALL - NO TRIAGE-A-OH

## 2022-08-21 ENCOUNTER — TRANSFERRED RECORDS (OUTPATIENT)
Dept: HEALTH INFORMATION MANAGEMENT | Facility: CLINIC | Age: 87
End: 2022-08-21

## 2022-09-06 ENCOUNTER — TRANSFERRED RECORDS (OUTPATIENT)
Dept: HEALTH INFORMATION MANAGEMENT | Facility: CLINIC | Age: 87
End: 2022-09-06

## 2022-09-27 ENCOUNTER — TRANSFERRED RECORDS (OUTPATIENT)
Dept: HEALTH INFORMATION MANAGEMENT | Facility: CLINIC | Age: 87
End: 2022-09-27

## 2022-10-24 ENCOUNTER — TELEPHONE (OUTPATIENT)
Dept: FAMILY MEDICINE | Facility: CLINIC | Age: 87
End: 2022-10-24

## 2022-10-24 NOTE — TELEPHONE ENCOUNTER
SLIM Rothman calling from [facility] for verbal orders.    Verbal order to evaluate for bilateral knee pain.    Verbal order given.  Will fax for MD signature.  Flaquita King RN

## 2022-10-28 ENCOUNTER — MEDICAL CORRESPONDENCE (OUTPATIENT)
Dept: HEALTH INFORMATION MANAGEMENT | Facility: CLINIC | Age: 87
End: 2022-10-28

## 2022-10-28 ENCOUNTER — TRANSFERRED RECORDS (OUTPATIENT)
Dept: FAMILY MEDICINE | Facility: CLINIC | Age: 87
End: 2022-10-28

## 2022-11-04 ENCOUNTER — TELEPHONE (OUTPATIENT)
Dept: FAMILY MEDICINE | Facility: CLINIC | Age: 87
End: 2022-11-04

## 2022-11-04 DIAGNOSIS — I50.9 CHRONIC CONGESTIVE HEART FAILURE, UNSPECIFIED HEART FAILURE TYPE (H): Primary | ICD-10-CM

## 2022-11-04 DIAGNOSIS — L21.9 SEBORRHEIC DERMATITIS: ICD-10-CM

## 2022-11-04 DIAGNOSIS — I34.0 MITRAL VALVE INSUFFICIENCY, UNSPECIFIED ETIOLOGY: ICD-10-CM

## 2022-11-04 DIAGNOSIS — I34.0 MITRAL REGURGITATION: ICD-10-CM

## 2022-11-04 RX ORDER — KETOCONAZOLE 20 MG/G
CREAM TOPICAL DAILY
Qty: 30 G | Refills: 1 | Status: SHIPPED | OUTPATIENT
Start: 2022-11-04 | End: 2024-04-09

## 2022-11-04 NOTE — TELEPHONE ENCOUNTER
Marion daughter calling, [facility] on file.  States she is leaving the country for 12 days so wanted to report a change she noticed with Dayana before she leaves for her trip.  States Dayana has CHF.  States Dayana is having swollen ankles.  Swelling goes to top of socks.  States when she presses on ankles it dimples.  Weight 114# this am.  She did advise Dayana to elevate legs when watching TV.  Denies shortness of breath.  Denies cough.  Is fatigued but that is ongoing but has been ongoing for a long time and that she has a leaky valve.  No oximeter.  Dayana fills med box on own and takes medications on her own.  Does have furosemide 40 mg and is taking as ordered.  Please advise.  Call Marion back at [phone].  Flaquita King RN

## 2022-11-04 NOTE — TELEPHONE ENCOUNTER
On days that are particularly bad, she can take an extra half tab of furosemide (20 mg) along with half a tab of potassium (10 mEq). Continue with daily weights until she leaves and upon return.    If there is more than 2 lbs in a day or 5lbs in a week she needs to contact us.  I do not see she has seen cardiology is more than a year? If that is correct, they need to schedule a visit. I do not see an ECHO done in our records.

## 2022-11-04 NOTE — TELEPHONE ENCOUNTER
Called Marion and gave below #.  Also advised to let Dayana know they will call her to schedule.  Advised Marion it may be easiest if she calls to schedule now to avoid confusion.  Flaquita King RN    If you don't hear from a representative within 2 business days, please call [phone].

## 2022-11-04 NOTE — TELEPHONE ENCOUNTER
Called Marion and advised of below.  She has not seen a cardiologist since [place] and moving to the [facility].  T'd up referral.  Marion does leave tomorrow for her trip.  OK to leave cardiology scheduling # on her V/M if she does not answer.  Flaquita King RN

## 2022-11-20 ENCOUNTER — HEALTH MAINTENANCE LETTER (OUTPATIENT)
Age: 87
End: 2022-11-20

## 2022-11-28 ENCOUNTER — OFFICE VISIT (OUTPATIENT)
Dept: CARDIOLOGY | Facility: CLINIC | Age: 87
End: 2022-11-28
Attending: FAMILY MEDICINE
Payer: MEDICARE

## 2022-11-28 VITALS
SYSTOLIC BLOOD PRESSURE: 108 MMHG | DIASTOLIC BLOOD PRESSURE: 60 MMHG | HEART RATE: 72 BPM | WEIGHT: 115.8 LBS | HEIGHT: 60 IN | BODY MASS INDEX: 22.74 KG/M2

## 2022-11-28 DIAGNOSIS — I50.9 CHRONIC CONGESTIVE HEART FAILURE, UNSPECIFIED HEART FAILURE TYPE (H): ICD-10-CM

## 2022-11-28 DIAGNOSIS — I34.0 MITRAL VALVE INSUFFICIENCY, UNSPECIFIED ETIOLOGY: ICD-10-CM

## 2022-11-28 PROCEDURE — 99204 OFFICE O/P NEW MOD 45 MIN: CPT | Performed by: INTERNAL MEDICINE

## 2022-11-28 PROCEDURE — 93000 ELECTROCARDIOGRAM COMPLETE: CPT | Performed by: INTERNAL MEDICINE

## 2022-11-28 NOTE — LETTER
11/28/2022    Elisha Mcdaniel MD  [address] [place] [facility] [address]    RE: Dayana Samano       Dear Colleague,     I had the pleasure of seeing Dayana Samano in the [facility].  Cardiology Consultation      Dayana Samano MRN# [id number]   YOB: 1924 Age: 89 year old   Date of Visit 11/28/2022     Reason for consult:            Assessment and Plan:           1. Diastolic congestive heart failure with minor exacerbation, now currently well compensated on current medical regimen    Continue conservative management given her age and lack of symptoms.  Discussed that we do not need to repeat echocardiogram unless decompensation clinically.    Follow-up as needed in cardiology clinic.               45 minutes spent today in review of past medical record, discussion with patient and postvisit charting    This note was transcribed using electronic voice recognition software, typographical errors may be present.                Chief Complaint:   Heart Failure           History of Present Illness:   The patient is a very pleasant 89 year old whom I am meeting for the first time today.  She is accompanied by her daughter.  She likely has some element of minor diastolic congestive heart failure that is well maintained on a small dose of diuretic.  Her daughter noticed a little bit of lower extremity swelling that has now resolved.  The patient denies any dyspnea on exertion or orthopnea/PND.  She states she feels better when lying down.    I reviewed her echocardiogram from 2017 done at outside institution.  She had normal LV function and moderate mitral regurgitation.           Physical Exam:     Vitals: /60   Pulse 72   Ht 1.524 m (5')   Wt 52.5 kg (115 lb 12.8 oz)   BMI 22.62 kg/m    Constitutional:  cooperative, alert and oriented, well developed, well nourished, in no acute distress        Skin:  warm and dry to the touch   actinic keratosis    Head:  normocephalic         Eyes:  pupils equal and round, conjunctivae and lids unremarkable, sclera white, no xanthalasma, EOMS intact, no nystagmus        ENT:  no pallor or cyanosis        Neck:  JVP normal        Chest:  normal symmetry        Cardiac: regular rhythm       grade 2;holosystolic murmur          Abdomen:  BS normoactive        Vascular:                                        Extremities and Back:  no deformities, clubbing, cyanosis, erythema observed        Neurological:  no gross motor deficits                      Past Medical History:   I have reviewed this patient's past medical history  Past Medical History:   Diagnosis Date     Aftercare following surgery of the musculoskeletal system 2017     Closed fracture of multiple ribs of left side with routine healing 2016     Closed fracture of ramus of left pubis (H) 2017     Closed fracture of right femur (H) 2017     Essential hypertension, benign      Hearing loss     wears bilateral aids     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; 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     History of endocarditis      Osteoporosis, unspecified      Personal history of colonic polyps     last colonoscopy              Past Surgical History:   I have reviewed this patient's past surgical history  Past Surgical History:   Procedure Laterality Date     FEMUR SURGERY Right 2017    nail; right intertrochanteric fracture     LAPAROSCOPIC APPENDECTOMY N/A 2020    Procedure: LAPAROSCOPIC APPENDECTOMY;  Surgeon: Spenser Kent MD;  Location: [facility] OR     ORIF ELBOW FRACTURE Left 01/01/2015    x2               Social History:   I have reviewed this patient's social history  Social History     Tobacco Use     Smoking status: Former     Types: Cigarettes     Quit date: 1990     Years since quittin.9     Smokeless tobacco: Never   Substance Use Topics     Alcohol use: Yes     Comment: glass of wine with dinner-sometimes              Family History:   I have reviewed this patient's family history  Family History   Problem Relation Age of Onset     Cerebrovascular Disease Father              Allergies:     Allergies   Allergen Reactions     Meperidine Nausea and Vomiting     Morphine Nausea and Vomiting     Hydromorphone Nausea and Vomiting     No Known Allergies              Medications:   I have reviewed this patient's current medications  Current Outpatient Medications   Medication Sig Dispense Refill     amoxicillin (AMOXIL) 500 MG capsule TK FOUR CS PO 1 HOUR B DAPP       aspirin (ASA) 325 MG tablet Take 325 mg by mouth daily       calcium carbonate 1250 (500 Ca) MG CHEW Take 500 mg by mouth       furosemide (LASIX) 40 MG tablet Take 1 tablet (40 mg) by mouth daily 90 tablet 3     hydrocortisone 2.5 % cream        hypromellose (ARTIFICIAL TEARS) 0.5 % SOLN ophthalmic solution 1 drop every hour as needed for dry eyes       ketoconazole (NIZORAL) 2 % external cream Apply topically daily 30 g 1     ketoconazole (NIZORAL) 2 % external shampoo        latanoprost (XALATAN) 0.005 % ophthalmic solution Place 1 drop into both eyes daily       lisinopril (ZESTRIL) 40 MG tablet Take 1 tablet (40 mg) by mouth daily 90 tablet 3     metoprolol tartrate (LOPRESSOR) 100 MG tablet Take 1 tablet (100 mg) by mouth 2 times daily 180 tablet 3     multivitamin (OCUVITE) TABS tablet Take 1 tablet by mouth daily       potassium chloride ER (KLOR-CON M) 20 MEQ CR tablet Take 1 tablet (20 mEq) by mouth daily (Patient taking differently: Take 40 mEq by mouth daily) 90 tablet 3     timolol maleate (TIMOPTIC) 0.5 % ophthalmic solution Place 1 drop into both eyes every evening        acetaminophen (TYLENOL) 500 MG tablet Take 1-2 tablets (500-1,000 mg) by mouth every 6 hours as needed for mild pain (Patient not taking: Reported on 11/28/2022)       CALCIUM 600 + D 600-200 MG-UNIT OR TABS Take 1 tablet by mouth daily  (Patient not taking: Reported on 11/28/2022) 3 MONTHS  1 YEAR     fluticasone (FLONASE) 50 MCG/ACT nasal spray Spray 1 spray in nostril (Patient not taking: Reported on 11/28/2022)                 Review of Systems:       Review of Systems:  Skin:        Eyes:       ENT:       Respiratory:       Cardiovascular:       Gastroenterology:      Genitourinary:       Musculoskeletal:       Neurologic:       Psychiatric:       Heme/Lymph/Imm:       Endocrine:                          Data:   All available laboratory data reviewed  No results found for: CHOL  No results found for: HDL  No results found for: LDL  No results found for: TRIG  No results found for: CHOLHDLRATIO  TSH   Date Value Ref Range Status   06/10/2021 0.60 0.40 - 4.00 mU/L Final     Last Basic Metabolic Panel:  Lab Results   Component Value Date     07/12/2022     06/10/2021      Lab Results   Component Value Date    POTASSIUM 4.5 07/12/2022    POTASSIUM 5.0 06/10/2021     Lab Results   Component Value Date    CHLORIDE 103 07/12/2022    CHLORIDE 103 06/10/2021     Lab Results   Component Value Date    LEONARD 10.8 07/12/2022    LEONARD 10.4 06/10/2021     Lab Results   Component Value Date    CO2 33 07/12/2022    CO2 31 06/10/2021     Lab Results   Component Value Date    BUN 30 07/12/2022    BUN 27 06/10/2021     Lab Results   Component Value Date    CR 0.87 07/12/2022    CR 1.13 06/10/2021     Lab Results   Component Value Date     07/12/2022    GLC 98 06/10/2021     Lab Results   Component Value Date    WBC 5.1 07/12/2022    WBC 4.9 06/10/2021     Lab Results   Component Value Date    RBC 3.79 07/12/2022    RBC 3.87 06/10/2021     Lab Results   Component Value Date    HGB 13.1 07/12/2022    HGB 12.7 06/10/2021     Lab Results   Component Value Date    HCT 39.7 07/12/2022    HCT 38.3 06/10/2021     Lab Results   Component Value Date     07/12/2022    MCV 99 06/10/2021     Lab Results   Component Value Date    MCH 34.6 07/12/2022    MCH 32.8 06/10/2021     Lab Results   Component Value Date     MCHC 33.0 07/12/2022    MCHC 33.2 06/10/2021     Lab Results   Component Value Date    RDW 11.9 07/12/2022    RDW 12.6 06/10/2021     Lab Results   Component Value Date     07/12/2022     06/10/2021           Thank you for allowing me to participate in the care of your patient.      Sincerely,     Sunil Stephenson MD      [facility] Care  cc:   Elisha Mcdaniel MD  [id number] [place] [address]

## 2022-12-07 ENCOUNTER — TRANSFERRED RECORDS (OUTPATIENT)
Dept: HEALTH INFORMATION MANAGEMENT | Facility: CLINIC | Age: 87
End: 2022-12-07

## 2022-12-10 ENCOUNTER — TRANSFERRED RECORDS (OUTPATIENT)
Dept: HEALTH INFORMATION MANAGEMENT | Facility: CLINIC | Age: 87
End: 2022-12-10

## 2023-01-11 ENCOUNTER — TRANSFERRED RECORDS (OUTPATIENT)
Dept: HEALTH INFORMATION MANAGEMENT | Facility: CLINIC | Age: 88
End: 2023-01-11

## 2023-01-12 ENCOUNTER — TRANSFERRED RECORDS (OUTPATIENT)
Dept: HEALTH INFORMATION MANAGEMENT | Facility: CLINIC | Age: 88
End: 2023-01-12

## 2023-01-16 ENCOUNTER — TRANSFERRED RECORDS (OUTPATIENT)
Dept: HEALTH INFORMATION MANAGEMENT | Facility: CLINIC | Age: 88
End: 2023-01-16

## 2023-02-08 ENCOUNTER — TRANSFERRED RECORDS (OUTPATIENT)
Dept: HEALTH INFORMATION MANAGEMENT | Facility: CLINIC | Age: 88
End: 2023-02-08

## 2023-02-17 ENCOUNTER — TELEPHONE (OUTPATIENT)
Dept: FAMILY MEDICINE | Facility: CLINIC | Age: 88
End: 2023-02-17
Payer: MEDICARE

## 2023-02-17 ENCOUNTER — NURSE TRIAGE (OUTPATIENT)
Dept: FAMILY MEDICINE | Facility: CLINIC | Age: 88
End: 2023-02-17
Payer: MEDICARE

## 2023-02-17 NOTE — TELEPHONE ENCOUNTER
ACH out of office this pm, routed to NWD and AK, can you advise? Pt need urgent care?    Marion Win RN, BSN  [facility]

## 2023-02-17 NOTE — TELEPHONE ENCOUNTER
Discussed with AK, averys Sol FERREIRA discussed with AK and AK informed pt to go to urgent care, daughter was informed  Marion Win RN, BSN  [facility]

## 2023-02-17 NOTE — TELEPHONE ENCOUNTER
"[phone]- Mairon- daughter    Nurse Triage SBAR    Is this a 2nd Level Triage? YES, LICENSED PRACTITIONER REVIEW IS REQUIRED    Situation:   Patient's daughter, Marion calling regarding her right 4th toe.     Background:   Patient's 4th right toe started hurting approximately 02/13/2023. Daughter Marion is at patient's home and is describing what appears to be a dark purple/black blood blister at the tip of the toe where the nail comes out. The toe is red and slightly swollen, and appears as if the nail may fall off. Patients numbness of toe, denies red streaking up the toe/foot, and denies fever. She does not recall any injury to it as she uses a walker and wears shoes around the apartment all the time. Daughter is a nurse and feels it looks infected.     Assessment:   4th right toe sounds to be infected. Daughter also thinks there may be a pus pocket under the skin as well. She is rating the pain at a 6/10. Patient needs to be seen today.    Protocol Recommended Disposition:   Go To ED/UCC Now (Or To Office With PCP Approval)    Recommendation:   Please advise on where patient should go for care. There are 2 appointments left in your clinic today with Adan Day but I am unsure if that provider is actually in clinic.      Routed to provider    Does the patient meet one of the following criteria for ADS visit consideration? 16+ years old, with an [facility] PCP     TIP  Providers, please consider if this condition is appropriate for management at one of our Acute and Diagnostic Services sites.     If patient is a good candidate, please use dotphrase <dot>triageresponse and select Refer to ADS to document.    ANNY Mondragon on 2/17/2023 at 12:42 PM      Reason for Disposition    Purple or black skin on toe  (Exception: Simple recalled injury with bruise.)    Answer Assessment - Initial Assessment Questions  1. ONSET: \"When did the pain start?\"       Approximately on 02/13/2023  2. LOCATION: \"Where is the pain located?\"   " "(e.g., around nail, entire toe, at foot joint)       Right 4th toe, at the tip of the toe where the nail comes out of the skin underneath, looks like a blood blister  3. PAIN: \"How bad is the pain?\"    (Scale 1-10; or mild, moderate, severe)    -  MILD (1-3): doesn't interfere with normal activities     -  MODERATE (4-7): interferes with normal activities (e.g., work or school) or awakens from sleep, limping     -  SEVERE (8-10): excruciating pain, unable to do any normal activities, unable to walk      Weight bearing approximately a 6/10   4. APPEARANCE: \"What does the toe look like?\" (e.g., redness, swelling, bruising, pallor)      Slight swelling under, looks like a blood blister at the tip of the toe. Looks like there might be some under the skin at the end of her toe.   5. CAUSE: \"What do you think is causing the toe pain?\"       No idea, feels better with sock off  6. OTHER SYMPTOMS: \"Do you have any other symptoms?\" (e.g., leg pain, rash, fever, numbness)      Red at the end of toe, no numbness  7. PREGNANCY: \"Is there any chance you are pregnant?\" \"When was your last menstrual period?\"      NA    Protocols used: TOE PAIN-A-OH      "

## 2023-02-17 NOTE — TELEPHONE ENCOUNTER
Consulted with Adan Day , he stated recommended that the daughter bring mom to urgent care , Daughter stated that she understood . Gave daughter information to where our closes UC are located and the ours she stated that she would do so .    Sol Oswald

## 2023-03-07 ENCOUNTER — TRANSFERRED RECORDS (OUTPATIENT)
Dept: HEALTH INFORMATION MANAGEMENT | Facility: CLINIC | Age: 88
End: 2023-03-07

## 2023-03-07 ENCOUNTER — TELEPHONE (OUTPATIENT)
Dept: FAMILY MEDICINE | Facility: CLINIC | Age: 88
End: 2023-03-07
Payer: MEDICARE

## 2023-03-07 NOTE — TELEPHONE ENCOUNTER
Orders in pcp basket. Sign and fax to [phone]    Audelia FERREIRA  [facility]   [facility]

## 2023-03-09 ENCOUNTER — TRANSFERRED RECORDS (OUTPATIENT)
Dept: HEALTH INFORMATION MANAGEMENT | Facility: CLINIC | Age: 88
End: 2023-03-09

## 2023-04-05 ENCOUNTER — TRANSFERRED RECORDS (OUTPATIENT)
Dept: HEALTH INFORMATION MANAGEMENT | Facility: CLINIC | Age: 88
End: 2023-04-05
Payer: MEDICARE

## 2023-04-07 ENCOUNTER — TELEPHONE (OUTPATIENT)
Dept: FAMILY MEDICINE | Facility: CLINIC | Age: 88
End: 2023-04-07

## 2023-04-07 NOTE — TELEPHONE ENCOUNTER
Patient Quality Outreach    Patient is due for the following:   Physical Annual Wellness Visit      Topic Date Due     Diptheria Tetanus Pertussis (DTAP/TDAP/TD) Vaccine (1 - Tdap) 01/01/2015     COVID-19 Vaccine (4 - Booster for Moderna series) 01/27/2022     Flu Vaccine (1) 09/01/2022       Next Steps:   Schedule a Annual Wellness Visit    Type of outreach:    Sent [employer] message.    Next Steps:  Reach out within 90 days via Phone and Letter.    Max number of attempts reached: No. Will try again in 90 days if patient still on fail list.    Questions for provider review:    None     Adam Heller        Patient Quality Outreach Summary      Summary:    Patient is due/failing the following:   Annual wellness, date due: 01/02/2008    Health Maintenance Due   Topic     Diptheria Tetanus Pertussis (DTAP/TDAP/TD) Vaccine (1 - Tdap)       Type of outreach:    Sent letter.    Questions for provider review:    None                                                                                                                    Adam Heller, CMA on 6/1/2023 at 12:10 PM

## 2023-04-07 NOTE — LETTER
June 1, 2023      Dayana Samano  [address]  [place] [address]        Dear Dayana,     Our records indicate that you have not scheduled for a(an) appointment with your provider for an Annual Wellness Visit which was recommended by your health care provider.     If you have received your health care elsewhere, please call the clinic so the information can be documented in your chart and name can be removed from the reminder list.     Please call [phone] to schedule your appointment with your primary care provider        Thank you for your understanding.     Sincerely,      [facility]

## 2023-05-03 ENCOUNTER — TRANSFERRED RECORDS (OUTPATIENT)
Dept: HEALTH INFORMATION MANAGEMENT | Facility: CLINIC | Age: 88
End: 2023-05-03
Payer: MEDICARE

## 2023-05-05 ENCOUNTER — TELEPHONE (OUTPATIENT)
Dept: FAMILY MEDICINE | Facility: CLINIC | Age: 88
End: 2023-05-05
Payer: MEDICARE

## 2023-05-05 NOTE — TELEPHONE ENCOUNTER
Forms/Letter Request    Type of form/letter: Received 6 page fax from Myron BARRETT at [facility] Updated Plan of Care put in PCP in basket      Have you been seen for this request:     Do we have the form/letter: Yes    Who is the form from?     Where did/will the form come from? form was faxed in    When is form/letter needed by:     How would you like the form/letter returned: Please fax to: [phone]    JHON Rice

## 2023-05-22 ENCOUNTER — TELEPHONE (OUTPATIENT)
Dept: FAMILY MEDICINE | Facility: CLINIC | Age: 88
End: 2023-05-22
Payer: MEDICARE

## 2023-05-22 NOTE — TELEPHONE ENCOUNTER
Forms/Letter Request    Type of form/letter: Disabitlity Parking Certificate    Have you been seen for this request: N/A    Do we have the form/letter: Yes: At the [facility] in Dr Jim Amaya forms folder    Who is the form from? Patient    Where did/will the form come from? Patient or family brought in       When is form/letter needed by:     How would you like the form/letter returned:     Patient Notified form requests are processed in 3-5 business days:Yes    Could we send this information to you in [facility] [facility] or would you prefer to receive a phone call?:   Patient would prefer a phone call   Okay to leave a detailed message?: Yes Call Marion at [phone]

## 2023-05-23 NOTE — TELEPHONE ENCOUNTER
Called left msg form complete, copied for abstraction, placed up front, ready for . Ruth Behrens.

## 2023-06-02 ENCOUNTER — HEALTH MAINTENANCE LETTER (OUTPATIENT)
Age: 88
End: 2023-06-02

## 2023-07-04 ENCOUNTER — TRANSFERRED RECORDS (OUTPATIENT)
Dept: HEALTH INFORMATION MANAGEMENT | Facility: CLINIC | Age: 88
End: 2023-07-04
Payer: MEDICARE

## 2023-07-05 ENCOUNTER — TRANSFERRED RECORDS (OUTPATIENT)
Dept: HEALTH INFORMATION MANAGEMENT | Facility: CLINIC | Age: 88
End: 2023-07-05
Payer: MEDICARE

## 2023-07-17 DIAGNOSIS — I50.9 CHRONIC CONGESTIVE HEART FAILURE, UNSPECIFIED HEART FAILURE TYPE (H): ICD-10-CM

## 2023-07-17 DIAGNOSIS — I10 ESSENTIAL HYPERTENSION, BENIGN: ICD-10-CM

## 2023-07-20 RX ORDER — LISINOPRIL 40 MG/1
TABLET ORAL
Qty: 90 TABLET | Refills: 0 | Status: SHIPPED | OUTPATIENT
Start: 2023-07-20 | End: 2023-08-02

## 2023-07-20 RX ORDER — METOPROLOL TARTRATE 100 MG
TABLET ORAL
Qty: 180 TABLET | Refills: 0 | Status: SHIPPED | OUTPATIENT
Start: 2023-07-20 | End: 2023-08-02

## 2023-07-20 NOTE — TELEPHONE ENCOUNTER
Prescription approved per [facility] Refill Protocol.  ALISHA refill. Further refills at upcoming appointment.  Alexa Ash RN

## 2023-08-02 ENCOUNTER — OFFICE VISIT (OUTPATIENT)
Dept: FAMILY MEDICINE | Facility: CLINIC | Age: 88
End: 2023-08-02
Payer: MEDICARE

## 2023-08-02 VITALS
TEMPERATURE: 98 F | RESPIRATION RATE: 19 BRPM | BODY MASS INDEX: 23.19 KG/M2 | DIASTOLIC BLOOD PRESSURE: 78 MMHG | HEIGHT: 60 IN | WEIGHT: 118.1 LBS | SYSTOLIC BLOOD PRESSURE: 126 MMHG | OXYGEN SATURATION: 95 % | HEART RATE: 67 BPM

## 2023-08-02 DIAGNOSIS — Z00.00 ENCOUNTER FOR MEDICARE ANNUAL WELLNESS EXAM: Primary | ICD-10-CM

## 2023-08-02 DIAGNOSIS — N18.31 STAGE 3A CHRONIC KIDNEY DISEASE (H): ICD-10-CM

## 2023-08-02 DIAGNOSIS — I10 ESSENTIAL HYPERTENSION, BENIGN: ICD-10-CM

## 2023-08-02 DIAGNOSIS — D69.6 THROMBOCYTOPENIA (H): ICD-10-CM

## 2023-08-02 DIAGNOSIS — I50.9 CHRONIC CONGESTIVE HEART FAILURE, UNSPECIFIED HEART FAILURE TYPE (H): ICD-10-CM

## 2023-08-02 LAB
ERYTHROCYTE [DISTWIDTH] IN BLOOD BY AUTOMATED COUNT: 11.7 % (ref 10–15)
HCT VFR BLD AUTO: 42.2 % (ref 35–47)
HGB BLD-MCNC: 14 G/DL (ref 11.7–15.7)
MCH RBC QN AUTO: 32.7 PG (ref 26.5–33)
MCHC RBC AUTO-ENTMCNC: 33.2 G/DL (ref 31.5–36.5)
MCV RBC AUTO: 99 FL (ref 78–100)
PLATELET # BLD AUTO: 134 10E3/UL (ref 150–450)
RBC # BLD AUTO: 4.28 10E6/UL (ref 3.8–5.2)
WBC # BLD AUTO: 4.6 10E3/UL (ref 4–11)

## 2023-08-02 PROCEDURE — 82043 UR ALBUMIN QUANTITATIVE: CPT | Performed by: FAMILY MEDICINE

## 2023-08-02 PROCEDURE — 82570 ASSAY OF URINE CREATININE: CPT | Performed by: FAMILY MEDICINE

## 2023-08-02 PROCEDURE — G0439 PPPS, SUBSEQ VISIT: HCPCS | Performed by: FAMILY MEDICINE

## 2023-08-02 PROCEDURE — 85027 COMPLETE CBC AUTOMATED: CPT | Performed by: FAMILY MEDICINE

## 2023-08-02 PROCEDURE — 84443 ASSAY THYROID STIM HORMONE: CPT | Performed by: FAMILY MEDICINE

## 2023-08-02 PROCEDURE — 99214 OFFICE O/P EST MOD 30 MIN: CPT | Mod: 25 | Performed by: FAMILY MEDICINE

## 2023-08-02 PROCEDURE — 36415 COLL VENOUS BLD VENIPUNCTURE: CPT | Performed by: FAMILY MEDICINE

## 2023-08-02 PROCEDURE — 80053 COMPREHEN METABOLIC PANEL: CPT | Performed by: FAMILY MEDICINE

## 2023-08-02 RX ORDER — POTASSIUM CHLORIDE 1500 MG/1
20 TABLET, EXTENDED RELEASE ORAL DAILY
Qty: 90 TABLET | Refills: 3 | Status: ON HOLD | OUTPATIENT
Start: 2023-08-02 | End: 2024-04-12

## 2023-08-02 RX ORDER — FUROSEMIDE 40 MG
40 TABLET ORAL DAILY
Qty: 90 TABLET | Refills: 3 | Status: SHIPPED | OUTPATIENT
Start: 2023-08-02 | End: 2024-08-19

## 2023-08-02 RX ORDER — LISINOPRIL 40 MG/1
40 TABLET ORAL DAILY
Qty: 90 TABLET | Refills: 3 | Status: SHIPPED | OUTPATIENT
Start: 2023-08-02 | End: 2024-05-03

## 2023-08-02 RX ORDER — METOPROLOL TARTRATE 100 MG
100 TABLET ORAL 2 TIMES DAILY
Qty: 180 TABLET | Refills: 3 | Status: SHIPPED | OUTPATIENT
Start: 2023-08-02 | End: 2024-08-19

## 2023-08-02 SDOH — HEALTH STABILITY: PHYSICAL HEALTH: ON AVERAGE, HOW MANY MINUTES DO YOU ENGAGE IN EXERCISE AT THIS LEVEL?: 20 MIN

## 2023-08-02 SDOH — HEALTH STABILITY: PHYSICAL HEALTH: ON AVERAGE, HOW MANY DAYS PER WEEK DO YOU ENGAGE IN MODERATE TO STRENUOUS EXERCISE (LIKE A BRISK WALK)?: 7 DAYS

## 2023-08-02 SDOH — ECONOMIC STABILITY: FOOD INSECURITY: WITHIN THE PAST 12 MONTHS, THE FOOD YOU BOUGHT JUST DIDN'T LAST AND YOU DIDN'T HAVE MONEY TO GET MORE.: NEVER TRUE

## 2023-08-02 SDOH — ECONOMIC STABILITY: FOOD INSECURITY: WITHIN THE PAST 12 MONTHS, YOU WORRIED THAT YOUR FOOD WOULD RUN OUT BEFORE YOU GOT MONEY TO BUY MORE.: NEVER TRUE

## 2023-08-02 SDOH — ECONOMIC STABILITY: INCOME INSECURITY: HOW HARD IS IT FOR YOU TO PAY FOR THE VERY BASICS LIKE FOOD, HOUSING, MEDICAL CARE, AND HEATING?: NOT HARD AT ALL

## 2023-08-02 SDOH — ECONOMIC STABILITY: INCOME INSECURITY: IN THE LAST 12 MONTHS, WAS THERE A TIME WHEN YOU WERE NOT ABLE TO PAY THE MORTGAGE OR RENT ON TIME?: NO

## 2023-08-02 SDOH — ECONOMIC STABILITY: TRANSPORTATION INSECURITY
IN THE PAST 12 MONTHS, HAS LACK OF TRANSPORTATION KEPT YOU FROM MEETINGS, WORK, OR FROM GETTING THINGS NEEDED FOR DAILY LIVING?: NO

## 2023-08-02 SDOH — ECONOMIC STABILITY: TRANSPORTATION INSECURITY
IN THE PAST 12 MONTHS, HAS THE LACK OF TRANSPORTATION KEPT YOU FROM MEDICAL APPOINTMENTS OR FROM GETTING MEDICATIONS?: NO

## 2023-08-02 ASSESSMENT — ENCOUNTER SYMPTOMS
DIARRHEA: 0
ARTHRALGIAS: 1
COUGH: 0
HEMATURIA: 0
HEADACHES: 0
DIZZINESS: 0
HEARTBURN: 0
PARESTHESIAS: 0
DYSURIA: 0
FEVER: 0
ABDOMINAL PAIN: 0
HEMATOCHEZIA: 0
FREQUENCY: 0
WEAKNESS: 1
NERVOUS/ANXIOUS: 0
EYE PAIN: 0
SHORTNESS OF BREATH: 0
CHILLS: 0
NAUSEA: 0
CONSTIPATION: 0
MYALGIAS: 0
JOINT SWELLING: 0
PALPITATIONS: 0
SORE THROAT: 0

## 2023-08-02 ASSESSMENT — LIFESTYLE VARIABLES
AUDIT-C TOTAL SCORE: 3
HOW OFTEN DO YOU HAVE SIX OR MORE DRINKS ON ONE OCCASION: NEVER
SKIP TO QUESTIONS 9-10: 1
HOW MANY STANDARD DRINKS CONTAINING ALCOHOL DO YOU HAVE ON A TYPICAL DAY: 1 OR 2
HOW OFTEN DO YOU HAVE A DRINK CONTAINING ALCOHOL: 2-3 TIMES A WEEK

## 2023-08-02 ASSESSMENT — ACTIVITIES OF DAILY LIVING (ADL)
CURRENT_FUNCTION: TRANSPORTATION REQUIRES ASSISTANCE
CURRENT_FUNCTION: SHOPPING REQUIRES ASSISTANCE
CURRENT_FUNCTION: BATHING REQUIRES ASSISTANCE

## 2023-08-02 ASSESSMENT — SOCIAL DETERMINANTS OF HEALTH (SDOH)
HOW OFTEN DO YOU ATTEND CHURCH OR RELIGIOUS SERVICES?: MORE THAN 4 TIMES PER YEAR
IN A TYPICAL WEEK, HOW MANY TIMES DO YOU TALK ON THE PHONE WITH FAMILY, FRIENDS, OR NEIGHBORS?: MORE THAN THREE TIMES A WEEK
HOW OFTEN DO YOU GET TOGETHER WITH FRIENDS OR RELATIVES?: MORE THAN THREE TIMES A WEEK
DO YOU BELONG TO ANY CLUBS OR ORGANIZATIONS SUCH AS CHURCH GROUPS UNIONS, FRATERNAL OR ATHLETIC GROUPS, OR SCHOOL GROUPS?: NO

## 2023-08-02 NOTE — PATIENT INSTRUCTIONS
Patient Education   Personalized Prevention Plan  You are due for the preventive services outlined below.  Your care team is available to assist you in scheduling these services.  If you have already completed any of these items, please share that information with your care team to update in your medical record.  Health Maintenance Due   Topic Date Due     Thyroid Function Lab  06/10/2022     Basic Metabolic Panel  01/12/2023     Liver Monitoring Lab  07/12/2023     Kidney Microalbumin Urine Test  07/12/2023     Complete Blood Count  07/12/2023     Hemoglobin  07/12/2023     Your Health Risk Assessment indicates you feel you are not in good health    A healthy lifestyle helps keep the body fit and the mind alert. It helps protect you from disease, helps you fight disease, and helps prevent chronic disease (disease that doesn't go away) from getting worse. This is important as you get older and begin to notice twinges in muscles and joints and a decline in the strength and stamina you once took for granted. A healthy lifestyle includes good healthcare, good nutrition, weight control, recreation, and regular exercise. Avoid harmful substances and do what you can to keep safe. Another part of a healthy lifestyle is stay mentally active and socially involved.    Good healthcare   Have a wellness visit every year.   If you have new symptoms, let us know right away. Don't wait until the next checkup.   Take medicines exactly as prescribed and keep your medicines in a safe place. Tell us if your medicine causes problems.   Healthy diet and weight control   Eat 3 or 4 small, nutritious, low-fat, high-fiber meals a day. Include a variety of fruits, vegetables, and whole-grain foods.   Make sure you get enough calcium in your diet. Calcium, vitamin D, and exercise help prevent osteoporosis (bone thinning).   If you live alone, try eating with others when you can. That way you get a good meal and have company while you eat it.    Try to keep a healthy weight. If you eat more calories than your body uses for energy, it will be stored as fat and you will gain weight.     Recreation   Recreation is not limited to sports and team events. It includes any activity that provides relaxation, interest, enjoyment, and exercise. Recreation provides an outlet for physical, mental, and social energy. It can give a sense of worth and achievement. It can help you stay healthy.    Mental Exercise and Social Involvement  Mental and emotional health is as important as physical health. Keep in touch with friends and family. Stay as active as possible. Continue to learn and challenge yourself.   Things you can do to stay mentally active are:  Learn something new, like a foreign language or musical instrument.   Play SCRABBLE or do crossword puzzles. If you cannot find people to play these games with you at home, you can play them with others on your computer through the Internet.   Join a games club--anything from card games to chess or checkers or lawn bowling.   Start a new hobby.   Go back to school.   Volunteer.   Read.   Keep up with world events.  Learning About Dietary Guidelines  What are the Dietary Guidelines for Americans?     Dietary Guidelines for Americans provide tips for eating well and staying healthy. This helps reduce the risk for long-term (chronic) diseases.  These guidelines recommend that you:  Eat and drink the right amount for you. The U.S. government's food guide is called MyPlate. It can help you make your own well-balanced eating plan.  Try to balance your eating with your activity. This helps you stay at a healthy weight.  Drink alcohol in moderation, if at all.  Limit foods high in salt, saturated fat, trans fat, and added sugar.  These guidelines are from the U.S. Department of Agriculture and the U.S. Department of Health and Human Services. They are updated every 5 years.  What is MyPlate?  MyPlate is the U.S. government's  "food guide. It can help you make your own well-balanced eating plan. A balanced eating plan means that you eat enough, but not too much, and that your food gives you the nutrients you need to stay healthy.  MyPlate focuses on eating plenty of whole grains, fruits, and vegetables, and on limiting fat and sugar. It is available online at www.ChooseMyPlate.gov.  How can you get started?  If you're trying to eat healthier, you can slowly change your eating habits over time. You don't have to make big changes all at once. Start by adding one or two healthy foods to your meals each day.  Grains  Choose whole-grain breads and cereals and whole-wheat pasta and whole-grain crackers.  Vegetables  Eat a variety of vegetables every day. They have lots of nutrients and are part of a heart-healthy diet.  Fruits  Eat a variety of fruits every day. Fruits contain lots of nutrients. Choose fresh fruit instead of fruit juice.  Protein foods  Choose fish and lean poultry more often. Eat red meat and fried meats less often. Dried beans, tofu, and nuts are also good sources of protein.  Dairy  Choose low-fat or fat-free products from this food group. If you have problems digesting milk, try eating cheese or yogurt instead.  Fats and oils  Limit fats and oils if you're trying to cut calories. Choose healthy fats when you cook. These include canola oil and olive oil.  Where can you learn more?  Go to https://www.[employer].net/patiented  Enter D676 in the search box to learn more about \"Learning About Dietary Guidelines.\"  Current as of: March 1, 2023               Content Version: 13.7    1169-3945 [email or web address].   Care instructions adapted under license by your healthcare professional. If you have questions about a medical condition or this instruction, always ask your healthcare professional. [employer] disclaims any warranty or liability for your use of this information.      Activities of Daily Living    Your " Health Risk Assessment indicates you have difficulties with activities of daily living such as housework, bathing, preparing meals, taking medication, etc. Please make a follow up appointment for us to address this issue in more detail.  Hearing Loss: Care Instructions  Overview     Hearing loss is a sudden or slow decrease in how well you hear. It can range from slight to profound. Permanent hearing loss can occur with aging. It also can happen when you are exposed long-term to loud noise. Examples include listening to loud music, riding motorcycles, or being around other loud machines.  Hearing loss can affect your work and home life. It can make you feel lonely or depressed. You may feel that you have lost your independence. But hearing aids and other devices can help you hear better and feel connected to others.  Follow-up care is a key part of your treatment and safety. Be sure to make and go to all appointments, and call your doctor if you are having problems. It's also a good idea to know your test results and keep a list of the medicines you take.  How can you care for yourself at home?  Avoid loud noises whenever possible. This helps keep your hearing from getting worse.  Always wear hearing protection around loud noises.  Wear a hearing aid as directed.  A professional can help you pick a hearing aid that will work best for you.  You can also get hearing aids over the counter for mild to moderate hearing loss.  Have hearing tests as your doctor suggests. They can show whether your hearing has changed. Your hearing aid may need to be adjusted.  Use other devices as needed. These may include:  Telephone amplifiers and hearing aids that can connect to a television, stereo, radio, or microphone.  Devices that use lights or vibrations. These alert you to the doorbell, a ringing telephone, or a baby monitor.  Television closed-captioning. This shows the words at the bottom of the screen. Most new TVs can do  "this.  TTY (text telephone). This lets you type messages back and forth on the telephone instead of talking or listening. These devices are also called TDD. When messages are typed on the keyboard, they are sent over the phone line to a receiving TTY. The message is shown on a monitor.  Use text messaging, social media, and email if it is hard for you to communicate by telephone.  Try to learn a listening technique called speechreading. It is not lipreading. You pay attention to people's gestures, expressions, posture, and tone of voice. These clues can help you understand what a person is saying. Face the person you are talking to, and have them face you. Make sure the lighting is good. You need to see the other person's face clearly.  Think about counseling if you need help to adjust to your hearing loss.  When should you call for help?  Watch closely for changes in your health, and be sure to contact your doctor if:    You think your hearing is getting worse.     You have new symptoms, such as dizziness or nausea.   Where can you learn more?  Go to https://www.[employer].net/patiented  Enter R798 in the search box to learn more about \"Hearing Loss: Care Instructions.\"  Current as of: March 1, 2023               Content Version: 13.7    8261-0312 [employer].   Care instructions adapted under license by your healthcare professional. If you have questions about a medical condition or this instruction, always ask your healthcare professional. [employer] disclaims any warranty or liability for your use of this information.      Bladder Training: Care Instructions  Your Care Instructions     Bladder training is used to treat urge incontinence and stress incontinence. Urge incontinence means that the need to urinate comes on so fast that you can't get to a toilet in time. Stress incontinence means that you leak urine because of pressure on your bladder. For example, it may happen when you " laugh, cough, or lift something heavy.  Bladder training can increase how long you can wait before you have to urinate. It can also help your bladder hold more urine. And it can give you better control over the urge to urinate.  It is important to remember that bladder training takes a few weeks to a few months to make a difference. You may not see results right away, but don't give up.  Follow-up care is a key part of your treatment and safety. Be sure to make and go to all appointments, and call your doctor if you are having problems. It's also a good idea to know your test results and keep a list of the medicines you take.  How can you care for yourself at home?  Work with your doctor to come up with a bladder training program that is right for you. You may use one or more of the following methods.  Delayed urination  In the beginning, try to keep from urinating for 5 minutes after you first feel the need to go.  While you wait, take deep, slow breaths to relax. Kegel exercises can also help you delay the need to go to the bathroom.  After some practice, when you can easily wait 5 minutes to urinate, try to wait 10 minutes before you urinate.  Slowly increase the waiting period until you are able to control when you have to urinate.  Scheduled urination  Empty your bladder when you first wake up in the morning.  Schedule times throughout the day when you will urinate.  Start by going to the bathroom every hour, even if you don't need to go.  Slowly increase the time between trips to the bathroom.  When you have found a schedule that works well for you, keep doing it.  If you wake up during the night and have to urinate, do it. Apply your schedule to waking hours only.  Kegel exercises  These tighten and strengthen pelvic muscles, which can help you control the flow of urine. (If doing these exercises causes pain, stop doing them and talk with your doctor.) To do Kegel exercises:  Squeeze your muscles as if you  "were trying not to pass gas. Or squeeze your muscles as if you were stopping the flow of urine. Your belly, legs, and buttocks shouldn't move.  Hold the squeeze for 3 seconds, then relax for 5 to 10 seconds.  Start with 3 seconds, then add 1 second each week until you are able to squeeze for 10 seconds.  Repeat the exercise 10 times a session. Do 3 to 8 sessions a day.  When should you call for help?  Watch closely for changes in your health, and be sure to contact your doctor if:    Your incontinence is getting worse.     You do not get better as expected.   Where can you learn more?  Go to https://www.[employer].net/patiented  Enter V684 in the search box to learn more about \"Bladder Training: Care Instructions.\"  Current as of: March 1, 2023               Content Version: 13.7    9068-0144 [employer].   Care instructions adapted under license by your healthcare professional. If you have questions about a medical condition or this instruction, always ask your healthcare professional. [employer], [employer] disclaims any warranty or liability for your use of this information.         "

## 2023-08-02 NOTE — PROGRESS NOTES
"SUBJECTIVE:   Dayana is a 89 year old who presents for Preventive Visit.      8/2/2023     2:27 PM   Additional Questions   Roomed by Jessika Pacheco     Here today for wellness visit.    Needs to get medications refilled.    Of note, got Synvisc injections in knees last fall, worked very well.  Squamous cell cancer taken off, gets injections in eyes for macular degeneration.    Are you in the first 12 months of your Medicare coverage?  No    Healthy Habits:     In general, how would you rate your overall health?  Fair    Frequency of exercise:  6-7 days/week    Duration of exercise:  15-30 minutes    Do you usually eat at least 4 servings of fruit and vegetables a day, include whole grains    & fiber and avoid regularly eating high fat or \"junk\" foods?  No    Taking medications regularly:  Yes    Medication side effects:  Not applicable    Ability to successfully perform activities of daily living:  Transportation requires assistance, shopping requires assistance and bathing requires assistance    Home Safety:  No safety concerns identified    Hearing Impairment:  Difficulty following a conversation in a noisy restaurant or crowded room, feel that people are mumbling or not speaking clearly, difficulty following dialogue in the theater, difficult to understand a speaker at a public meeting or [employer] service, need to ask people to speak up or repeat themselves, find that men's voices are easier to understand than woman's, difficulty understanding soft or whispered speech and difficulty understanding speech on the telephone    In the past 6 months, have you been bothered by leaking of urine? Yes    In general, how would you rate your overall mental or emotional health?  Good    Additional concerns today:  Yes        Have you ever done Advance Care Planning? (For example, a Health Directive, POLST, or a discussion with a medical provider or your loved ones about your wishes): Yes, advance care planning is on " file.       Fall risk  Fallen 2 or more times in the past year?: No  Any fall with injury in the past year?: No    Cognitive Screening   1) Repeat 3 items (Leader, Season, Table)    2) Clock draw: NORMAL  3) 3 item recall: Recalls 3 objects  Results: 3 items recalled: COGNITIVE IMPAIRMENT LESS LIKELY    Mini-CogTM Copyright JACQUI Patterson. Licensed by the author for use in [facility]; reprinted with permission (abby@[facility]). All rights reserved.      Do you have sleep apnea, excessive snoring or daytime drowsiness? : daytime drowsiness    Reviewed and updated as needed this visit by clinical staff   Tobacco  Allergies               Reviewed and updated as needed this visit by Provider                 Social History     Tobacco Use     Smoking status: Former     Types: Cigarettes     Quit date: 1990     Years since quittin.6     Smokeless tobacco: Never   Substance Use Topics     Alcohol use: Yes     Comment: glass of wine with dinner-sometimes             2023     2:13 PM   Alcohol Use   Prescreen: >3 drinks/day or >7 drinks/week? No     Do you have a current opioid prescription? No  Do you use any other controlled substances or medications that are not prescribed by a provider? None      Current providers sharing in care for this patient include:   Patient Care Team:  Elisha Gaona MD as PCP - General (Family Medicine)  Elisha Gaona MD as Assigned PCP  Sunil Stephenson MD as MD (Cardiovascular Disease)  Sunil Stephenson MD as Assigned Heart and Vascular Provider    The following health maintenance items are reviewed in Epic and correct as of today:  Health Maintenance   Topic Date Due     MEDICARE ANNUAL WELLNESS VISIT  2008     DTAP/TDAP/TD IMMUNIZATION (1 - Tdap) 2015     TSH W/FREE T4 REFLEX  06/10/2022     BMP  2023     ALT  2023     MICROALBUMIN  2023     CBC  2023     ANNUAL REVIEW OF HM ORDERS  2023      HEMOGLOBIN  2023     INFLUENZA VACCINE (1) 2023     FALL RISK ASSESSMENT  2024     HF ACTION PLAN  2025     ADVANCE CARE PLANNING  2027     PHQ-2 (once per calendar year)  Completed     Pneumococcal Vaccine: 65+ Years  Completed     URINALYSIS  Completed     ZOSTER IMMUNIZATION  Completed     COVID-19 Vaccine  Completed     IPV IMMUNIZATION  Aged Out     MENINGITIS IMMUNIZATION  Aged Out     DEXA  Discontinued     LIPID  Discontinued     MAMMO SCREENING  Discontinued     COLORECTAL CANCER SCREENING  Discontinued     Lab work is in process  Labs reviewed in EPIC  BP Readings from Last 3 Encounters:   23 (!) 159/81   22 108/60   08/10/22 (!) 157/78    Wt Readings from Last 3 Encounters:   23 53.6 kg (118 lb 1.6 oz)   22 52.5 kg (115 lb 12.8 oz)   22 53.5 kg (118 lb)                  Patient Active Problem List   Diagnosis     Essential hypertension, benign     Osteoporosis     Esophageal reflux     History of CVA (cerebrovascular accident)     History of endocarditis     Hearing loss     Adjustment disorder with anxious mood     History of skin cancer     Weakness     Vitamin D deficiency     Thrombocytopenia (H)     Rotator cuff tear     Nuclear cataract, nonsenile     Mitral regurgitation     Macular degeneration     Hypokalemia     Falls     DNR (do not resuscitate)     Colon polyps     CHF (congestive heart failure) (H)     Anxiety     Chronic kidney disease, stage 3 (H)     Past Surgical History:   Procedure Laterality Date     FEMUR SURGERY Right 2017    nail; right intertrochanteric fracture     LAPAROSCOPIC APPENDECTOMY N/A 2020    Procedure: LAPAROSCOPIC APPENDECTOMY;  Surgeon: Spenser Kent MD;  Location: [facility] OR     ORIF ELBOW FRACTURE Left 01/01/2015    x2       Social History     Tobacco Use     Smoking status: Former     Types: Cigarettes     Quit date: 1990     Years since quittin.6     Smokeless tobacco: Never   Substance  Use Topics     Alcohol use: Yes     Comment: glass of wine with dinner-sometimes     Family History   Problem Relation Age of Onset     Cerebrovascular Disease Father          Current Outpatient Medications   Medication Sig Dispense Refill     amoxicillin (AMOXIL) 500 MG capsule TK FOUR CS PO 1 HOUR B DAPP       aspirin (ASA) 325 MG tablet Take 325 mg by mouth daily       CALCIUM 600 + D 600-200 MG-UNIT OR TABS Take 1 tablet by mouth daily  3 MONTHS 1 YEAR     furosemide (LASIX) 40 MG tablet Take 1 tablet (40 mg) by mouth daily 90 tablet 3     hypromellose (ARTIFICIAL TEARS) 0.5 % SOLN ophthalmic solution 1 drop every hour as needed for dry eyes       ketoconazole (NIZORAL) 2 % external cream Apply topically daily 30 g 1     ketoconazole (NIZORAL) 2 % external shampoo        latanoprost (XALATAN) 0.005 % ophthalmic solution Place 1 drop into both eyes daily       lisinopril (ZESTRIL) 40 MG tablet Take 1 tablet (40 mg) by mouth daily 90 tablet 3     metoprolol tartrate (LOPRESSOR) 100 MG tablet Take 1 tablet (100 mg) by mouth 2 times daily 180 tablet 3     multivitamin (OCUVITE) TABS tablet Take 1 tablet by mouth daily       potassium chloride ER (KLOR-CON M) 20 MEQ CR tablet Take 1 tablet (20 mEq) by mouth daily 90 tablet 3     timolol maleate (TIMOPTIC) 0.5 % ophthalmic solution Place 1 drop into both eyes every evening        acetaminophen (TYLENOL) 500 MG tablet Take 1-2 tablets (500-1,000 mg) by mouth every 6 hours as needed for mild pain (Patient not taking: Reported on 11/28/2022)       Allergies   Allergen Reactions     Meperidine Nausea and Vomiting     Morphine Nausea and Vomiting     Hydromorphone Nausea and Vomiting     No Known Allergies      Recent Labs   Lab Test 07/12/22  1629 09/15/21  0939 06/10/21  1547 07/14/20  1413 05/11/20  0530 05/09/20  0909 05/08/20  1057 05/08/20  1043   A1C  --   --   --   --   --  5.4  --   --    ALT 19  --  24  --   --   --   --  18   CR 0.87 1.10* 1.13* 0.98   < > 1.12*   --  1.13*   GFRESTIMATED 60* 42* 41* 48*   < > 42*   < > 41*   GFRESTBLACK  --   --  47* 56*   < > 48*   < > 48*   POTASSIUM 4.5 4.7 5.0 4.5   < > 4.5  --  4.3   TSH  --   --  0.60  --   --   --   --   --     < > = values in this interval not displayed.        Mammogram Screening - Patient over age 75, has elected to discontinue screenings.  Pertinent mammograms are reviewed under the imaging tab.    Review of Systems   Constitutional:  Negative for chills and fever.   HENT:  Positive for hearing loss. Negative for congestion, ear pain and sore throat.    Eyes:  Negative for pain and visual disturbance.   Respiratory:  Negative for cough and shortness of breath.    Cardiovascular:  Negative for chest pain, palpitations and peripheral edema.   Gastrointestinal:  Negative for abdominal pain, constipation, diarrhea, heartburn, hematochezia and nausea.   Breasts:  Negative for tenderness and discharge.   Genitourinary:  Positive for urgency. Negative for dysuria, frequency, genital sores, hematuria, pelvic pain, vaginal bleeding and vaginal discharge.   Musculoskeletal:  Positive for arthralgias. Negative for joint swelling and myalgias.   Skin:  Negative for rash.   Neurological:  Positive for weakness. Negative for dizziness, headaches and paresthesias.   Psychiatric/Behavioral:  Negative for mood changes. The patient is not nervous/anxious.          OBJECTIVE:   BP (!) 159/81 (BP Location: Right arm, Patient Position: Sitting, Cuff Size: Adult Regular)   Pulse 67   Temp 98  F (36.7  C) (Oral)   Resp 19   Ht 1.524 m (5')   Wt 53.6 kg (118 lb 1.6 oz)   SpO2 95%   BMI 23.06 kg/m   Estimated body mass index is 23.06 kg/m  as calculated from the following:    Height as of this encounter: 1.524 m (5').    Weight as of this encounter: 53.6 kg (118 lb 1.6 oz).  Physical Exam  GENERAL: healthy, alert and no distress  RESP: lungs clear to auscultation - no rales, rhonchi or wheezes  CV: regular rates and rhythm  PSYCH:  mentation appears normal, affect normal/bright    Diagnostic Test Results:  Labs reviewed in Epic    ASSESSMENT / PLAN:       ICD-10-CM    1. Encounter for Medicare annual wellness exam  Z00.00 Exam completed today, routine health maintenance items updated as able.  Labs ordered.  Follow up one year or sooner as needed.      2. Stage 3a chronic kidney disease (H)  N18.31 Albumin Random Urine Quantitative with Creat Ratio     Albumin Random Urine Quantitative with Creat Ratio      3. Essential hypertension, benign  I10 lisinopril (ZESTRIL) 40 MG tablet     metoprolol tartrate (LOPRESSOR) 100 MG tablet     Comprehensive metabolic panel (BMP + Alb, Alk Phos, ALT, AST, Total. Bili, TP)     Comprehensive metabolic panel (BMP + Alb, Alk Phos, ALT, AST, Total. Bili, TP)      4. Chronic congestive heart failure, unspecified heart failure type (H)  I50.9 lisinopril (ZESTRIL) 40 MG tablet     metoprolol tartrate (LOPRESSOR) 100 MG tablet     potassium chloride ER (KLOR-CON M) 20 MEQ CR tablet     furosemide (LASIX) 40 MG tablet     TSH with free T4 reflex     TSH with free T4 reflex      5. Thrombocytopenia (H)  D69.6 CBC with platelets     CBC with platelets          Patient has been advised of split billing requirements and indicates understanding: Yes      COUNSELING:  Reviewed preventive health counseling, as reflected in patient instructions        She reports that she quit smoking about 33 years ago. She has never used smokeless tobacco.      Appropriate preventive services were discussed with this patient, including applicable screening as appropriate for cardiovascular disease, diabetes, osteopenia/osteoporosis, and glaucoma.  As appropriate for age/gender, discussed screening for colorectal cancer, prostate cancer, breast cancer, and cervical cancer. Checklist reviewing preventive services available has been given to the patient.    Reviewed patients plan of care and provided an AVS. The Basic Care Plan (routine  screening as documented in Health Maintenance) for Dayana meets the Care Plan requirement. This Care Plan has been established and reviewed with the Patient.      Elisha Mcdaniel MD  [facility]    Identified Health Risks:  I have reviewed Opioid Use Disorder and Substance Use Disorder risk factors and made any needed referrals.     The patient was provided with suggestions to help her develop a healthy physical lifestyle.    The patient was counseled and encouraged to consider modifying their diet and eating habits. She was provided with information on recommended healthy diet options.    The patient reports that she has difficulty with activities of daily living. I have asked that the patient make a follow up appointment in 26 weeks where this issue will be further evaluated and addressed.    The patient was provided with written information regarding signs of hearing loss.    Information on urinary incontinence and treatment options given to patient.

## 2023-08-03 LAB
ALBUMIN SERPL BCG-MCNC: 4.7 G/DL (ref 3.5–5.2)
ALP SERPL-CCNC: 74 U/L (ref 35–104)
ALT SERPL W P-5'-P-CCNC: 12 U/L (ref 0–50)
ANION GAP SERPL CALCULATED.3IONS-SCNC: 12 MMOL/L (ref 7–15)
AST SERPL W P-5'-P-CCNC: 26 U/L (ref 0–45)
BILIRUB SERPL-MCNC: 0.6 MG/DL
BUN SERPL-MCNC: 23.2 MG/DL (ref 8–23)
CALCIUM SERPL-MCNC: 10.9 MG/DL (ref 8.2–9.6)
CHLORIDE SERPL-SCNC: 98 MMOL/L (ref 98–107)
CREAT SERPL-MCNC: 0.95 MG/DL (ref 0.51–0.95)
CREAT UR-MCNC: 29 MG/DL
DEPRECATED HCO3 PLAS-SCNC: 28 MMOL/L (ref 22–29)
GFR SERPL CREATININE-BSD FRML MDRD: 54 ML/MIN/1.73M2
GLUCOSE SERPL-MCNC: 107 MG/DL (ref 70–99)
MICROALBUMIN UR-MCNC: <12 MG/L
MICROALBUMIN/CREAT UR: NORMAL MG/G{CREAT}
POTASSIUM SERPL-SCNC: 4.4 MMOL/L (ref 3.4–5.3)
PROT SERPL-MCNC: 6.8 G/DL (ref 6.4–8.3)
SODIUM SERPL-SCNC: 138 MMOL/L (ref 136–145)
TSH SERPL DL<=0.005 MIU/L-ACNC: 0.93 UIU/ML (ref 0.3–4.2)

## 2023-08-29 NOTE — TELEPHONE ENCOUNTER
Patient needs appt. Has established care with a new provider for next week.     Nara Panchal RN on 6/1/2021 at 12:52 PM     IV discontinued, cath removed intact

## 2023-10-02 ENCOUNTER — TRANSFERRED RECORDS (OUTPATIENT)
Dept: HEALTH INFORMATION MANAGEMENT | Facility: CLINIC | Age: 88
End: 2023-10-02
Payer: MEDICARE

## 2023-10-16 ENCOUNTER — TELEPHONE (OUTPATIENT)
Dept: FAMILY MEDICINE | Facility: CLINIC | Age: 88
End: 2023-10-16
Payer: MEDICARE

## 2023-10-16 ENCOUNTER — MEDICAL CORRESPONDENCE (OUTPATIENT)
Dept: FAMILY MEDICINE | Facility: CLINIC | Age: 88
End: 2023-10-16

## 2023-10-16 NOTE — TELEPHONE ENCOUNTER
Leonie from [facility] therapy calling.  She informs that [facility] has faxed forms to the clinic which have not been received back.    Provided fax number- [phone]. Leonie will re fax forms now.    Routing to  to inform of incoming faxes.    Ashlee CABEZAS RN

## 2023-10-19 NOTE — PROGRESS NOTES
Cardiology Consultation      Dayana Samano MRN# [id number]   YOB: 1924 Age: 89 year old   Date of Visit 11/28/2022     Reason for consult:            Assessment and Plan:           1. Diastolic congestive heart failure with minor exacerbation, now currently well compensated on current medical regimen    Continue conservative management given her age and lack of symptoms.  Discussed that we do not need to repeat echocardiogram unless decompensation clinically.    Follow-up as needed in cardiology clinic.               45 minutes spent today in review of past medical record, discussion with patient and postvisit charting    This note was transcribed using electronic voice recognition software, typographical errors may be present.                Chief Complaint:   Heart Failure           History of Present Illness:   The patient is a very pleasant 89 year old whom I am meeting for the first time today.  She is accompanied by her daughter.  She likely has some element of minor diastolic congestive heart failure that is well maintained on a small dose of diuretic.  Her daughter noticed a little bit of lower extremity swelling that has now resolved.  The patient denies any dyspnea on exertion or orthopnea/PND.  She states she feels better when lying down.    I reviewed her echocardiogram from 2017 done at outside institution.  She had normal LV function and moderate mitral regurgitation.           Physical Exam:     Vitals: /60   Pulse 72   Ht 1.524 m (5')   Wt 52.5 kg (115 lb 12.8 oz)   BMI 22.62 kg/m    Constitutional:  cooperative, alert and oriented, well developed, well nourished, in no acute distress        Skin:  warm and dry to the touch   actinic keratosis    Head:  normocephalic        Eyes:  pupils equal and round, conjunctivae and lids unremarkable, sclera white, no xanthalasma, EOMS intact, no nystagmus        ENT:  no pallor or cyanosis        Neck:  JVP normal        Chest:  normal  Chief complaint:  Low back and thoracic back pain leg pain    History:  Pleasant 88-year-old gentleman who presents with a multiple year history of low back pain going on for multiple years where he has pain low back going the buttocks posterior thighs more recently he is also pain in the thoracic spine and more recently notices more leg symptoms.  Has known bladder cancer and prostate cancer he has had prostatectomy in the past.  He denies any fevers or chills or loss of bowel or bladder control.  He has constant numbness in his feet which he is had for multiple years which at times is uncomfortable that can go up towards the lower legs at night.  That is constant all the time he is on gabapentin 100 mg 3 times a day was on lower doses in the past not really feeling that is helpful for his back leg or feet symptoms.  Patient also reports he does have memory problems however chronically.    His pains are gradually getting worse    He has not had physical therapy.  He had injections for his back 40 years ago for left-sided low back pain which was sharp stabbing in nature.  Most his pain he identifies his primarily aching in nature now identifies the pain is made worse with standing and walking more so than any other activity sitting at times can be discomforting though.  But standing and walking is most painful for him.  He denies any night pain he denies any fevers chills or loss of bowel or bladder control.  He has not done any physical therapy he has not had any further imaging of his lumbar spine or thoracic spine despite these new or worsening symptoms.  He was previously seen by Dr. Reyes in the past in 2019 that noted he had low back pain and did not comment in the note about having thoracic back pain    Patient has been having fatigue    Past Medical History:   Diagnosis Date   • Allergy    • Back pain    • Cataract    • Dupuytren's contracture of left hand    • DVT (deep venous thrombosis) (CMD)    •  Essential (primary) hypertension    • GERD (gastroesophageal reflux disease)    • [place] (hard of hearing)    • Hyperlipidemia    • Hyperphoria    • Kidney stone    • Malignant neoplasm (CMD)     prostate CA   • OA (osteoarthritis)    • PE (pulmonary thromboembolism) (CMD)    • Shoulder pain, left        Past Surgical History:   Procedure Laterality Date   • Cataract extraction w/ intraocular lens implant Left 02/25/2020    Dr. Morales   • Cataract extraction w/ intraocular lens implant Right 02/11/2020    Dr. Morales   • Dupuytren contracture release Left     5th finger   • Prostatectomy         ALLERGIES:   Allergen Reactions   • Tetanus Toxoid Other (See Comments)     fever   • Pneumococcal Vaccine SWELLING     arm   • Seasonal Other (See Comments)     Dust etc       Current Outpatient Medications   Medication Sig   • cephalexin 500 MG tablet Take 1 tablet by mouth in the morning and 1 tablet at noon and 1 tablet in the evening. Do all this for 7 days.   • amLODIPine (NORVASC) 2.5 MG tablet TAKE 3 TABLETS BY MOUTH DAILY   • gabapentin (NEURONTIN) 100 MG capsule TAKE 1 CAPSULE BY MOUTH IN  THE MORNING AND 1 CAPSULE  AT NOON AND 1 CAPSULE  BEFORE BEDTIME   • simvastatin (ZOCOR) 40 MG tablet TAKE 1 TABLET BY MOUTH AT NIGHT   • omeprazole (PriLOSEC) 40 MG capsule TAKE 1 CAPSULE BY MOUTH  DAILY   • apixaBAN (Eliquis) 5 MG Tab Take 1 tablet by mouth every 12 hours.   • ipratropium (ATROVENT) 0.06 % nasal spray USE 2 SPRAYS IN BOTH  NOSTRILS TWICE DAILY AS  NEEDED FOR RHINITIS   • acetaminophen (TYLENOL) 500 MG tablet Take 500 mg by mouth every 6 hours as needed for Pain.   • DAILY MULTIPLE VITAMINS PO Take 1 tablet by mouth daily.     No current facility-administered medications for this visit.       Social History     Socioeconomic History   • Marital status: /Civil Union     Spouse name: Not on file   • Number of children: Not on file   • Years of education: Not on file   • Highest education level: Not on file  symmetry        Cardiac: regular rhythm       grade 2;holosystolic murmur          Abdomen:  BS normoactive        Vascular:                                        Extremities and Back:  no deformities, clubbing, cyanosis, erythema observed        Neurological:  no gross motor deficits                      Past Medical History:   I have reviewed this patient's past medical history  Past Medical History:   Diagnosis Date     Aftercare following surgery of the musculoskeletal system 2017     Closed fracture of multiple ribs of left side with routine healing 2016     Closed fracture of ramus of left pubis (H) 2017     Closed fracture of right femur (H) 2017     Essential hypertension, benign      Hearing loss     wears bilateral aids     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; 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     History of endocarditis      Osteoporosis, unspecified      Personal history of colonic polyps     last colonoscopy              Past Surgical History:   I have reviewed this patient's past surgical history  Past Surgical History:   Procedure Laterality Date     FEMUR SURGERY Right 2017    nail; right intertrochanteric fracture     LAPAROSCOPIC APPENDECTOMY N/A 2020    Procedure: LAPAROSCOPIC APPENDECTOMY;  Surgeon: Spenser Kent MD;  Location: [facility] OR     ORIF ELBOW FRACTURE Left 01/01/2015    x2               Social History:   I have reviewed this patient's social history  Social History     Tobacco Use     Smoking status: Former     Types: Cigarettes     Quit date: 1990     Years since quittin.9     Smokeless tobacco: Never   Substance Use Topics     Alcohol use: Yes     Comment: glass of wine with dinner-sometimes             Family History:   I have reviewed this patient's family history  Family History   Problem Relation Age of Onset     Cerebrovascular Disease Father              Allergies:     Allergies   Allergen Reactions      Meperidine Nausea and Vomiting     Morphine Nausea and Vomiting     Hydromorphone Nausea and Vomiting     No Known Allergies              Medications:   I have reviewed this patient's current medications  Current Outpatient Medications   Medication Sig Dispense Refill     amoxicillin (AMOXIL) 500 MG capsule TK FOUR CS PO 1 HOUR B DAPP       aspirin (ASA) 325 MG tablet Take 325 mg by mouth daily       calcium carbonate 1250 (500 Ca) MG CHEW Take 500 mg by mouth       furosemide (LASIX) 40 MG tablet Take 1 tablet (40 mg) by mouth daily 90 tablet 3     hydrocortisone 2.5 % cream        hypromellose (ARTIFICIAL TEARS) 0.5 % SOLN ophthalmic solution 1 drop every hour as needed for dry eyes       ketoconazole (NIZORAL) 2 % external cream Apply topically daily 30 g 1     ketoconazole (NIZORAL) 2 % external shampoo        latanoprost (XALATAN) 0.005 % ophthalmic solution Place 1 drop into both eyes daily       lisinopril (ZESTRIL) 40 MG tablet Take 1 tablet (40 mg) by mouth daily 90 tablet 3     metoprolol tartrate (LOPRESSOR) 100 MG tablet Take 1 tablet (100 mg) by mouth 2 times daily 180 tablet 3     multivitamin (OCUVITE) TABS tablet Take 1 tablet by mouth daily       potassium chloride ER (KLOR-CON M) 20 MEQ CR tablet Take 1 tablet (20 mEq) by mouth daily (Patient taking differently: Take 40 mEq by mouth daily) 90 tablet 3     timolol maleate (TIMOPTIC) 0.5 % ophthalmic solution Place 1 drop into both eyes every evening        acetaminophen (TYLENOL) 500 MG tablet Take 1-2 tablets (500-1,000 mg) by mouth every 6 hours as needed for mild pain (Patient not taking: Reported on 11/28/2022)       CALCIUM 600 + D 600-200 MG-UNIT OR TABS Take 1 tablet by mouth daily  (Patient not taking: Reported on 11/28/2022) 3 MONTHS 1 YEAR     fluticasone (FLONASE) 50 MCG/ACT nasal spray Spray 1 spray in nostril (Patient not taking: Reported on 11/28/2022)                 Review of Systems:       Review of Systems:  Skin:        Eyes:     Occupational History   • Occupation: Retired tool and    Tobacco Use   • Smoking status: Never   • Smokeless tobacco: Never   Vaping Use   • Vaping Use: never used   Substance and Sexual Activity   • Alcohol use: Yes     Alcohol/week: 3.0 standard drinks of alcohol     Types: 3 Cans of beer per week   • Drug use: No   • Sexual activity: Not Currently     Partners: Female   Other Topics Concern   •  Service Not Asked   • Blood Transfusions Not Asked   • Caffeine Concern Yes     Comment: 1- cup coffee a day - denies soda or tea - drinks gatorade   • Occupational Exposure Not Asked   • Hobby Hazards Not Asked   • Sleep Concern Yes     Comment: 9hrs - feels rested - denies snoring   • Stress Concern Not Asked   • Weight Concern Not Asked   • Special Diet Not Asked   • Back Care Not Asked   • Exercise Yes     Comment: walks 3000 steps in a day   • Bike Helmet Not Asked   • Seat Belt Not Asked   • Self-Exams Not Asked   Social History Narrative    The patient states that he smoked into his 20's (10 years).  The patient states that he will have an occasional beer, more in the summer.  He works as a .  The patient is  with 3 children.  One son is in [place]. The patient has a daughter who was a nurse and also lives in [place].       Social Determinants of Health     Financial Resource Strain: Low Risk  (1/20/2023)    Financial Resource Strain    • Social Determinants: Financial Resource Strain: None   Food Insecurity: No Food Insecurity (1/20/2023)    Food Insecurity    • Social Determinants: Food Insecurity: Rarely   Transportation Needs: No Transportation Needs (1/25/2023)    OASIS : Transportation    • Lack of Transportation (Medical): No    • Lack of Transportation (Non-Medical): No    • Patient Unable or Declines to Respond: No   Physical Activity: Not on file   Stress: Not on file   Social Connections: Feeling Socially Integrated (1/25/2023)    OASIS : Social Isolation       ENT:       Respiratory:       Cardiovascular:       Gastroenterology:      Genitourinary:       Musculoskeletal:       Neurologic:       Psychiatric:       Heme/Lymph/Imm:       Endocrine:                          Data:   All available laboratory data reviewed  No results found for: CHOL  No results found for: HDL  No results found for: LDL  No results found for: TRIG  No results found for: CHOLHDLRATIO  TSH   Date Value Ref Range Status   06/10/2021 0.60 0.40 - 4.00 mU/L Final     Last Basic Metabolic Panel:  Lab Results   Component Value Date     07/12/2022     06/10/2021      Lab Results   Component Value Date    POTASSIUM 4.5 07/12/2022    POTASSIUM 5.0 06/10/2021     Lab Results   Component Value Date    CHLORIDE 103 07/12/2022    CHLORIDE 103 06/10/2021     Lab Results   Component Value Date    LEONARD 10.8 07/12/2022    LEONARD 10.4 06/10/2021     Lab Results   Component Value Date    CO2 33 07/12/2022    CO2 31 06/10/2021     Lab Results   Component Value Date    BUN 30 07/12/2022    BUN 27 06/10/2021     Lab Results   Component Value Date    CR 0.87 07/12/2022    CR 1.13 06/10/2021     Lab Results   Component Value Date     07/12/2022    GLC 98 06/10/2021     Lab Results   Component Value Date    WBC 5.1 07/12/2022    WBC 4.9 06/10/2021     Lab Results   Component Value Date    RBC 3.79 07/12/2022    RBC 3.87 06/10/2021     Lab Results   Component Value Date    HGB 13.1 07/12/2022    HGB 12.7 06/10/2021     Lab Results   Component Value Date    HCT 39.7 07/12/2022    HCT 38.3 06/10/2021     Lab Results   Component Value Date     07/12/2022    MCV 99 06/10/2021     Lab Results   Component Value Date    MCH 34.6 07/12/2022    MCH 32.8 06/10/2021     Lab Results   Component Value Date    MCHC 33.0 07/12/2022    MCHC 33.2 06/10/2021     Lab Results   Component Value Date    RDW 11.9 07/12/2022    RDW 12.6 06/10/2021     Lab Results   Component Value Date     07/12/2022         • Frequency of experiencing loneliness or isolation: Never   Intimate Partner Violence: Not At Risk (1/23/2023)    Intimate Partner Violence    • Social Determinants: Intimate Partner Violence Past Fear: No    • Social Determinants: Intimate Partner Violence Current Fear: No       Family History   Problem Relation Age of Onset   • Cancer, Breast Mother    • Cancer, Lung Father    • Cancer Father        ROS: as noted above    Physical Exam:  Vitals:  Visit Vitals  /60 (Cuff Size: Regular)   Pulse 68   Temp 97.2 °F (36.2 °C) (Temporal)   Ht 5' 9\" (1.753 m)   Wt 82.5 kg (181 lb 14.1 oz)   BMI 26.86 kg/m²     General: No apparent distress.  Awake and alert. Affect normal.  Respiratory: Effort nonlabored. Lungs clear to auscultation no crackles rales or wheezes.  Cardiovascular: Heart regular rate and rhythm possible murmur but heart sounds slightly decreased.  Trace edema noted in the lower extremities.  Neck: Supple. No masses and no thyromegaly.  Lymph node exam: No cervical or axillary lymphadenopathy is noted.  Abdomen: Nontender, mildly distended, positive bowel sounds, no hepatosplenomegaly. No epigastric tenderness.  Dermatologic: No nodules, rashes or lesions noted in the lumbar spine or the lower extremities.    HEENT exam: Cranial nerves III through XII on gross examination are grossly intact.    Musculoskeletal evaluation:  Inspection: No atrophy or fasciculations are noted in the lower extremities.  Palpation:  Patient is tender over the thoracic spinous processes lower thoracic spinous processes more so than in the paraspinal muscles in the thoracic spine Nontender over the lumbar spinous processes.  Not tender over the left paraspinal muscles, not tender over the right paraspinal muscles, not tender Left sacroiliac joint not tender right sacroiliac joint.       Range of motion: Left lumbar spine extension combined with axial rotation to the left side recreates pain, right lumbar spine extension  06/10/2021        combined with rotation to the right side recreates pain, lateral bending to the left side recreates pain, lateral bending to the right side recreates pain. Forward flexion of the lumbar spine not that painful.    Seated slump test left and right-side is mildly discomforting straight leg raise positive left and right-side    Issa's test right-side recreates right-sided low back pain Issa's test left side not that painful    Hip internal and external rotation was pain free on the left and right side.    Manual muscle testin out of 5 and bilaterally symmetrical in the lower extremities tested while sitting.  Formal hamstring testing 5/5 and symmetric bilaterally right single leg toe rises not substantial weakness noted significant difficulty with single leg toe rises noted on the left side with doing that  Sensation: Intact to light and sharp touch in the bilateral lower extremities.  Reflexes: Plus 2 but  the brisk side and bilaterally symmetrical at the patella, Achilles, and medial hamstrings except for subtly decreased relative Achilles to patella medial hamstrings that is still on the brisk side brachioradialis biceps triceps and jaw jerk all relatively on the brisk side and closer to + 2.  Hair's negative left and right-side  Gait: Patient able to ambulate on their toes and heels without any evidence of dynamic weakness.      Sodium (mmol/L)   Date Value   2023 143     Potassium (mmol/L)   Date Value   2023 4.0     Chloride (mmol/L)   Date Value   2023 108     Glucose (mg/dL)   Date Value   2023 92     Calcium (mg/dL)   Date Value   2023 8.6     Carbon Dioxide (mmol/L)   Date Value   2023 26     BUN (mg/dL)   Date Value   2023 16     Creatinine (mg/dL)   Date Value   2023 0.90       GOT/AST (Units/L)   Date Value   2023 50 (H)     GPT/ALT (Units/L)   Date Value   2023 41     No results found for: \"GGTP\"  Alkaline Phosphatase  (Units/L)   Date Value   01/23/2023 91     Bilirubin, Total (mg/dL)   Date Value   01/23/2023 0.8       WBC (K/mcL)   Date Value   04/17/2023 6.2     RBC (mil/mcL)   Date Value   04/17/2023 4.65     HCT (%)   Date Value   04/17/2023 43.1     HGB (g/dL)   Date Value   04/17/2023 14.0     PLT (K/mcL)   Date Value   04/17/2023 211           Impression:  1. Patient with new onset thoracic back pain in the past couple years with a known history of prostate and bladder cancer.  Does have spinous process tenderness most notably in the thoracic spine  2. Chronic low back pain now with leg pain buttocks posterior thighs getting worse over time does have weakness left single leg toe rises relative to right pattern of signs and symptoms most consistent with chronic S1 lumbar radiculopathy getting worse with weakness in the left plantar flexors history of prostate and bladder cancer    Recommendations/plan:  1. Patient does identify he has been having memory problems did not recommend any further increases in gabapentin at this point than what she is taking 100 mg t.i.d. and would like to avoid any potential sedating medications or medications that can cause sedation or confusion given this fact.  2. Given history of prostate and bladder cancer worsening thoracic symptoms and spinous processes tenderness concern for malignancy is possible will obtain x-rays of thoracic spine depending on results will proceed with MRI of the thoracic spine if x-rays are relatively unremarkable  3. Patient has lumbar radiculopathy given his worsening symptoms over time and noted history of prostate and bladder cancer will start with x-rays of lumbar spine if they are rather unremarkable will most likely proceed with MRI of lumbar spine for further workup given his history of bladder and prostate cancer.  Most likely though he probably has lumbar spinal stenosis accounting for his radiculopathy  4. Physical therapy lumbar spine stabilization  program thoracic spine stabilization program develop a home exercise program 1 to 2 times a week the next 6 weeks avoid extremes of extension patient currently lives at [facility] external prescription written so he can do the therapy at his place of living.  5. Follow up in 6 weeks or sooner if needed

## 2023-11-30 ENCOUNTER — TRANSFERRED RECORDS (OUTPATIENT)
Dept: HEALTH INFORMATION MANAGEMENT | Facility: CLINIC | Age: 88
End: 2023-11-30
Payer: MEDICARE

## 2023-12-15 ENCOUNTER — TRANSFERRED RECORDS (OUTPATIENT)
Dept: HEALTH INFORMATION MANAGEMENT | Facility: CLINIC | Age: 88
End: 2023-12-15
Payer: MEDICARE

## 2023-12-18 ENCOUNTER — TELEPHONE (OUTPATIENT)
Dept: FAMILY MEDICINE | Facility: CLINIC | Age: 88
End: 2023-12-18
Payer: MEDICARE

## 2023-12-18 NOTE — TELEPHONE ENCOUNTER
Daughter calling for refill of latanoprost eye drops.   These have been prescribed by eye doctor. Daughter will call Dr. Adams office. Will let us know if there are issues.    Alexa Ash RN

## 2023-12-30 ENCOUNTER — TRANSFERRED RECORDS (OUTPATIENT)
Dept: HEALTH INFORMATION MANAGEMENT | Facility: CLINIC | Age: 88
End: 2023-12-30
Payer: MEDICARE

## 2024-03-21 ENCOUNTER — VIRTUAL VISIT (OUTPATIENT)
Dept: FAMILY MEDICINE | Facility: CLINIC | Age: 89
End: 2024-03-21
Payer: MEDICARE

## 2024-03-21 ENCOUNTER — TELEPHONE (OUTPATIENT)
Dept: FAMILY MEDICINE | Facility: CLINIC | Age: 89
End: 2024-03-21

## 2024-03-21 DIAGNOSIS — L03.115 CELLULITIS OF RIGHT LOWER EXTREMITY: Primary | ICD-10-CM

## 2024-03-21 PROCEDURE — 99213 OFFICE O/P EST LOW 20 MIN: CPT | Mod: 95

## 2024-03-21 RX ORDER — CEPHALEXIN 500 MG/1
500 CAPSULE ORAL 4 TIMES DAILY
Qty: 28 CAPSULE | Refills: 0 | Status: SHIPPED | OUTPATIENT
Start: 2024-03-21 | End: 2024-03-28

## 2024-03-21 ASSESSMENT — PATIENT HEALTH QUESTIONNAIRE - PHQ9
SUM OF ALL RESPONSES TO PHQ QUESTIONS 1-9: 12
10. IF YOU CHECKED OFF ANY PROBLEMS, HOW DIFFICULT HAVE THESE PROBLEMS MADE IT FOR YOU TO DO YOUR WORK, TAKE CARE OF THINGS AT HOME, OR GET ALONG WITH OTHER PEOPLE: NOT DIFFICULT AT ALL
SUM OF ALL RESPONSES TO PHQ QUESTIONS 1-9: 12

## 2024-03-21 NOTE — TELEPHONE ENCOUNTER
Called daughter, informed, picked up 500 mg dose  Marion Win RN, BSN  [facility]

## 2024-03-21 NOTE — TELEPHONE ENCOUNTER
Yes patient should be taking the 500 mg dose four time daily for one week. Initial 250 mg prescription was sent in error and cancelled. Should only do 500 mg dose.    Thanks!  Annika Augustin PA-C

## 2024-03-21 NOTE — TELEPHONE ENCOUNTER
SAMUEL    Daughter at pharmacy to  Keflex prescription from today's visit for patient      Pharmacy received 2 prescriptions for 500 mg and 250 mg doses.    Looks like visit notes were stating 500 mg and plan for 250 mg.    The 500 mg was on the med list and 250 mg was canceled.    Advised patient to take keflex 500 mg dose.    Carmella Wang RN

## 2024-03-21 NOTE — TELEPHONE ENCOUNTER
Daughter calls, CTC on file, informs:  ~about one week ago pt had blood pressure piece of equipment fall on her left lower leg  ~pt has swelling and redness on top of her foot, spreading, worse now  ~some weeping  ~discussed, option today for virtual visit, discussed with another provider, daughter feels can access camera and complete this, accepts appointment    ROUTED FYI to Annika, they have virtual visit at 10 AM today  Marion Win RN, BSN  [facility]

## 2024-03-21 NOTE — PROGRESS NOTES
Dayana is a 89 year old who is being evaluated via a billable video visit.    How would you like to obtain your AVS? MyChart  If the video visit is dropped, the invitation should be resent by: Text to cell phone: [phone]  Will anyone else be joining your video visit? Yes-daughter      Assessment & Plan     (L03.115) Cellulitis of right lower extremity  (primary encounter diagnosis)  Cellulitis of RLE s/p mechanical injury (blood pressure device dropped on lege a few weeks ago). In last 24 hours notably more swelling, redness and a small amount of serous drainage. Daughter was on call and able to show the leg which appears very erythematous and swollen on exam. Will start her on oral Keflex 500 mg 4 x daily for 7 days. Follow up in person next week for recheck. If any worsening symptoms, fevers or other concerning signs/symptoms would recommend Urgent/Emergent evaluation.   Plan: cephALEXin (KEFLEX) 250 MG capsule          Depression Screening Follow Up      3/21/2024     8:57 AM   PHQ   PHQ-9 Total Score 12   Q9: Thoughts of better off dead/self-harm past 2 weeks Not at all     Follow Up Actions Taken  Referred patient back to PCP       Follow up for in person visit next week for re-assessment on improvement and to decide if longer course of antibiotics indicated.     Subjective   Dayana is a 89 year old, presenting for the following health issues:  Musculoskeletal Problem        3/21/2024     9:07 AM   Additional Questions   Roomed by Claribel Sage     Video Start Time: 9:27 AM    History of Present Illness       Reason for visit:  A caregiver dropped a blood pressure monitor on her left leg about 20 days ago. It states as a bruise, now accelerating  in last 24-48hrs. to increasing pain, redness and swelling.ings  Symptom onset:  1-2 weeks ago  Symptoms include:  Pain, swelling, redness lower right leg  Symptom intensity:  Moderate  Symptom progression:  Worsening  Had these symptoms before:  No  What makes  it worse:  Any slight pressure to area.  What makes it better:  Tylenol XStrength    She eats 2-3 servings of fruits and vegetables daily.She consumes 0 sweetened beverage(s) daily.She exercises with enough effort to increase her heart rate 9 or less minutes per day.  She exercises with enough effort to increase her heart rate 7 days per week.   She is taking medications regularly.       Pain History:  Where in your body do you have pain? Musculoskeletal problem/pain  Onset/Duration: 20 days ago  Description  Location: leg - right  Joint Swelling: YES  Redness: YES  Pain: YES    Intensity:  moderate  Progression of Symptoms:  worsening  Accompanying signs and symptoms:   Fevers: No  Numbness/tingling/weakness: No  History    Previous similar problem: No  Previous evaluation:  No      Review of Systems  Constitutional, HEENT, cardiovascular, pulmonary, gi and gu systems are negative, except as otherwise noted.        Objective           Vitals:  No vitals were obtained today due to virtual visit.    Physical Exam   GENERAL: alert and no distress  EYES: Eyes grossly normal to inspection.  No discharge or erythema, or obvious scleral/conjunctival abnormalities.  RESP: No audible wheeze, cough, or visible cyanosis.    SKIN: RLE appears swollen and very erythematous with small amount of serous drainage  NEURO: Cranial nerves grossly intact.  Mentation and speech appropriate for age.  PSYCH: Appropriate affect, tone, and pace of words      Video-Visit Details    Type of service:  Video Visit     Video End Time:9:40 AM    Originating Location (pt. Location): Home  Distant Location (provider location):  On-site  Platform used for Video Visit: Saeed    Signed Electronically by: Annika Augustin PA-C

## 2024-03-28 ENCOUNTER — TRANSFERRED RECORDS (OUTPATIENT)
Dept: HEALTH INFORMATION MANAGEMENT | Facility: CLINIC | Age: 89
End: 2024-03-28

## 2024-03-28 ENCOUNTER — OFFICE VISIT (OUTPATIENT)
Dept: FAMILY MEDICINE | Facility: CLINIC | Age: 89
End: 2024-03-28
Payer: MEDICARE

## 2024-03-28 VITALS
TEMPERATURE: 98.3 F | RESPIRATION RATE: 16 BRPM | DIASTOLIC BLOOD PRESSURE: 70 MMHG | WEIGHT: 118.9 LBS | HEART RATE: 71 BPM | SYSTOLIC BLOOD PRESSURE: 120 MMHG | OXYGEN SATURATION: 95 % | BODY MASS INDEX: 23.34 KG/M2 | HEIGHT: 60 IN

## 2024-03-28 DIAGNOSIS — L03.115 CELLULITIS OF RIGHT LOWER EXTREMITY: Primary | ICD-10-CM

## 2024-03-28 PROCEDURE — 99213 OFFICE O/P EST LOW 20 MIN: CPT | Performed by: PHYSICIAN ASSISTANT

## 2024-03-28 PROCEDURE — 87186 SC STD MICRODIL/AGAR DIL: CPT | Performed by: PHYSICIAN ASSISTANT

## 2024-03-28 PROCEDURE — 87076 CULTURE ANAEROBE IDENT EACH: CPT | Performed by: PHYSICIAN ASSISTANT

## 2024-03-28 PROCEDURE — 87070 CULTURE OTHR SPECIMN AEROBIC: CPT | Performed by: PHYSICIAN ASSISTANT

## 2024-03-28 PROCEDURE — 87077 CULTURE AEROBIC IDENTIFY: CPT | Performed by: PHYSICIAN ASSISTANT

## 2024-03-28 PROCEDURE — 87205 SMEAR GRAM STAIN: CPT | Performed by: PHYSICIAN ASSISTANT

## 2024-03-28 RX ORDER — RESPIRATORY SYNCYTIAL VIRUS VACCINE 120MCG/0.5
0.5 KIT INTRAMUSCULAR ONCE
Qty: 1 EACH | Refills: 0 | Status: CANCELLED | OUTPATIENT
Start: 2024-03-28 | End: 2024-03-28

## 2024-03-28 RX ORDER — DOXYCYCLINE 100 MG/1
100 CAPSULE ORAL 2 TIMES DAILY
Qty: 14 CAPSULE | Refills: 0 | Status: SHIPPED | OUTPATIENT
Start: 2024-03-28 | End: 2024-04-09

## 2024-03-28 NOTE — PROGRESS NOTES
Assessment & Plan     Cellulitis of right lower extremity    Start on doxycycline and await wound culture. Recheck in 4 days.    - doxycycline hyclate (VIBRAMYCIN) 100 MG capsule; Take 1 capsule (100 mg) by mouth 2 times daily  - Wound Aerobic Bacterial Culture Routine With Gram Stain                  Subjective   Dayana is a 89 year old, presenting for the following health issues:  Recheck Medication and Musculoskeletal Problem    HPI     Followup:    Facility:  Virtual visit   Date of visit: 3/21/24  Reason for visit: cellulitis  Current Status: same        Review of Systems  Constitutional, HEENT, cardiovascular, pulmonary, gi and gu systems are negative, except as otherwise noted.          Objective    /70 (BP Location: Right arm, Patient Position: Sitting, Cuff Size: Adult Regular)   Pulse 71   Temp 98.3  F (36.8  C) (Oral)   Resp 16   Ht 1.524 m (5')   Wt 53.9 kg (118 lb 14.4 oz)   SpO2 95%   BMI 23.22 kg/m    Body mass index is 23.22 kg/m .      Physical Exam   GENERAL: alert and no distress  EYES: Eyes grossly normal to inspection, PERRL and conjunctivae and sclerae normal  MS: no gross musculoskeletal defects noted, no edema  SKIN: no suspicious lesions or rashes  NEURO: Normal strength and tone, mentation intact and speech normal  PSYCH: mentation appears normal, affect normal/bright  Right lower leg: Erythema has lightened but does extend almost to knee level. There is an open area on anterior lower leg about 1 cm in diameter. Good granulation of tissue, minor drainage. No obvious purulence. Slightly warm and mildly tender.            Signed Electronically by: Altaf San PA-C

## 2024-03-30 LAB
BACTERIA WND CULT: ABNORMAL
GRAM STAIN RESULT: ABNORMAL

## 2024-04-01 ENCOUNTER — TELEPHONE (OUTPATIENT)
Dept: WOUND CARE | Facility: CLINIC | Age: 89
End: 2024-04-01

## 2024-04-01 ENCOUNTER — MYC MEDICAL ADVICE (OUTPATIENT)
Dept: FAMILY MEDICINE | Facility: CLINIC | Age: 89
End: 2024-04-01

## 2024-04-01 ENCOUNTER — OFFICE VISIT (OUTPATIENT)
Dept: FAMILY MEDICINE | Facility: CLINIC | Age: 89
End: 2024-04-01
Payer: MEDICARE

## 2024-04-01 VITALS
WEIGHT: 119 LBS | TEMPERATURE: 97.7 F | HEART RATE: 72 BPM | DIASTOLIC BLOOD PRESSURE: 81 MMHG | BODY MASS INDEX: 23.36 KG/M2 | OXYGEN SATURATION: 97 % | HEIGHT: 60 IN | SYSTOLIC BLOOD PRESSURE: 162 MMHG | RESPIRATION RATE: 18 BRPM

## 2024-04-01 DIAGNOSIS — L03.115 CELLULITIS OF RIGHT LOWER EXTREMITY: Primary | ICD-10-CM

## 2024-04-01 PROCEDURE — 96372 THER/PROPH/DIAG INJ SC/IM: CPT | Performed by: PHYSICIAN ASSISTANT

## 2024-04-01 PROCEDURE — 99214 OFFICE O/P EST MOD 30 MIN: CPT | Mod: 25 | Performed by: PHYSICIAN ASSISTANT

## 2024-04-01 RX ORDER — SULFAMETHOXAZOLE/TRIMETHOPRIM 800-160 MG
1 TABLET ORAL 2 TIMES DAILY
Qty: 14 TABLET | Refills: 0 | Status: SHIPPED | OUTPATIENT
Start: 2024-04-01 | End: 2024-04-09

## 2024-04-01 RX ORDER — RESPIRATORY SYNCYTIAL VIRUS VACCINE 120MCG/0.5
0.5 KIT INTRAMUSCULAR ONCE
Qty: 1 EACH | Refills: 0 | Status: CANCELLED | OUTPATIENT
Start: 2024-04-01 | End: 2024-04-01

## 2024-04-01 RX ORDER — CEFTRIAXONE SODIUM 1 G
1 VIAL (EA) INJECTION ONCE
Status: COMPLETED | OUTPATIENT
Start: 2024-04-01 | End: 2024-04-01

## 2024-04-01 RX ADMIN — Medication 1 G: at 14:12

## 2024-04-01 NOTE — TELEPHONE ENCOUNTER
See [employer] message, routed to Altaf San, pt already had visit with you today, see SAMUEL Win RN, BSN  [facility]

## 2024-04-01 NOTE — PROGRESS NOTES
Assessment & Plan     Cellulitis of right lower extremity    Refer to wound clinic. Give rocephin in clinic today and start bactrim as well. Follow-up here in 4 days if not able to see wound within 1 week.    - cefTRIAXone (ROCEPHIN) in lidocaine 1% (PF) for IM administration 1 g  - sulfamethoxazole-trimethoprim (BACTRIM DS) 800-160 MG tablet; Take 1 tablet by mouth 2 times daily for 7 days  - Wound Care Referral; Future                  Subjective   Dayana is a 89 year old, presenting for the following health issues:  RECHECK      4/1/2024     1:31 PM   Additional Questions   Roomed by Lory MAYER     General Follow Up    Concern: Cellulitis  Problem started: follow up from 3/28/2024  Progression of symptoms: pain was worse last night, wound unchanged, swelling of leg worsened        Review of Systems  Constitutional, HEENT, cardiovascular, pulmonary, gi and gu systems are negative, except as otherwise noted.      Objective    BP (!) 162/81 (BP Location: Right arm, Patient Position: Chair, Cuff Size: Adult Regular)   Pulse 72   Temp 97.7  F (36.5  C) (Oral)   Resp 18   Ht 1.524 m (5')   Wt 54 kg (119 lb)   SpO2 97%   BMI 23.24 kg/m    Body mass index is 23.24 kg/m .      Physical Exam   GENERAL: alert and no distress  EYES: Eyes grossly normal to inspection, PERRL and conjunctivae and sclerae normal  MS: no gross musculoskeletal defects noted, no edema  SKIN: no suspicious lesions or rashes  NEURO: Normal strength and tone, mentation intact and speech normal  PSYCH: mentation appears normal, affect normal/bright  Right lower leg: There is still slight erythema of entire lower leg. Swelling has worsening to 2+ pitting edema throughout lower leg starting at ankle to knee. Wound is still 1 cm in diameter and unchanged from last visit.            Signed Electronically by: Altaf San PA-C

## 2024-04-01 NOTE — TELEPHONE ENCOUNTER
Consult received via [employer] from     Altaf San PA-C in [facility]    for wound of the leg.    Please schedule with Susanna Finley PA-C, Lo Ennis NP, Saeed Marcano M.D., or Jaya Giordano M.D. at [facility] [place] for next available appointment.    **For all providers, Ansley Ramirez PA-C, Dr. Tejeda, Lo Ennis NP or Dr. Giordano, please schedule a follow up 2-3 weeks after initial appointment.**  --If unable to schedule within 2-3 weeks then please place on cancellation list--    Is the patient able to make their own medical decisions? Yes    Can the patient be scheduled on a Thursday? Yes    Is patient a JUAN lift? PLEASE INQUIRE WHEN MAKING THE APPOINTMENT AND PUT IN APPOINTMENT NOTES    Routing to  Wound Healing Scheduling.

## 2024-04-02 NOTE — TELEPHONE ENCOUNTER
[facility] Progress Note    Patient: Ross Beverly Date of Service: 10/10/2019   YOB: 1942 Admission Date: 10/9/2019   MRN: [id number] Attending: Kiran Agudelo MD       Summary:     The patient is a 77 year old Black/  male who has known B cell lymphoma, on chemotherapy, HTN, and BPH. Patient presented with dizziness lightheadedness and was found to have atrial fibrillation with RVR. He was given a Cardizem bolus that converted him into sinus rhythm. He does not have known previous cardiac history. Last echocardiogram in July revealed EF of 67%. Patient also reported some chest pain, retrosternal, pressure-like, moderate in intensity, lasted until he came to the emergency room. He is currently chest pain-free and he feels like his breathing is back to his baseline.     ICU admit date:  10/09/19  Intubation date:  N/A    Active problems:  Subsegmental pulmonary embolism  New onset atrial fibrillation, now in sinus rhythm  Diffuse B-cell lymphoma  Hypertension    Subjective/Interval Events:  Feeling well this morning. Has no more chest pain and is not short of breath. Denies any nausea/vomiting, leg pain. Has a mild headache.    A ten point review of systems was performed, pertinent positives and negatives as above.     ASSESSMENT/PLAN:    Neuro:   -Headache: Tylenol    CV:   -Atrial fibrillation with RVR (HR 160s): New onset. Received 50 mg IV cardizem bolus in the ED and converted to sinus rhythm, no episodes of afib since.   -Currently sinus rhythm  -TSH pending   -On PTA carvedilol 6.25 mg BID     -Elevated troponin: likely secondary to PE and demand ischemia, 0.08-->2.30-->2.28; will stop trending troponins. Echo 10/09/19 was unremarkable.     -HTN: PTA carvedilol 12.5 mg daily     Pulmonary:   -Pulmonary embolism: patient currently on room air, with no more chest pain. On a heparin gtt. Will determine anticoagulation regimen for discharge  -Consult hematology  Scheduled        GI:  -Starting mirtazapine nightly     Renal:    -Hypokalemia: low potassium on admission, has resolved with supplementation; now 4.0  -recheck BMP tomorrow morning     ID: WBC count 9.5 today, down from 20.5 yesterday   -No leukocytosis, afebrile, UA negative; discontinue cefepime today     Heme:   -Diffuse B-cell lymphoma, on chemo  -Will need anticoagulation for PE and new onset afib (CHADS-VASc 5)  -Currently on heparin  -Hematology consulted for anticoagulation recommendations    Endocrine:   -No active issues   -Follow up TSH     Goals/Disposition:       ================================================================      I/O last 3 completed shifts:  In: -   Out: 300 [Urine:300]   No intake/output data recorded.    Vital Last Value 24 Hour Range   Temperature 98.4 °F (36.9 °C) (10/10/19 0756) Temp  Min: 97.7 °F (36.5 °C)  Max: 98.4 °F (36.9 °C)   Pulse 59 (10/10/19 0809) Pulse  Min: 52  Max: 163   Respiratory 27 (10/10/19 0400) Resp  Min: 14  Max: 33   Non-Invasive  Blood Pressure 155/76 (10/10/19 0809) BP  Min: 96/53  Max: 155/76   Pulse Oximetry 98 % (10/10/19 0400) SpO2  Min: 95 %  Max: 100 %   Arterial   Blood Pressure   No data recorded     VENT SETTINGS       LAB RESULTS  Recent Labs   Lab 10/10/19  0245 10/09/19  1725   WBC 9.5 20.5*   HCT 29.1* 34.8*   HGB 9.4* 11.0*    275     Recent Labs   Lab 10/09/19  1725 10/03/19  1032   SODIUM 144 145   POTASSIUM 2.9* 4.2   CHLORIDE 103 106   CO2 27 33*   GLUCOSE 141* 93   BUN 25* 12   CREATININE 0.83 0.87       NEW IMAGING    TTE 10/09/19  FINAL IMPRESSIONS  Normal left ventricular cavity size with mildly increased wall thickness.  Normal left ventricular systolic function and regional wall motion, LVEF 64 %.  Normal right ventricular size and systolic function. Normal RVSP 20 mmHg.  Moderately increased left atrial size.  No significant valve abnormalities.  No pericardial effusion.    PHYSICAL EXAM  General:  NAD, lying comfortably in bed, right  sided port clean dry and intact.  HEENT:  EOMI. PERRL. No conjunctival pallor or scleral icterus  CV:  RRR, no m/r/g, S1/S2 present, no JVD, no B/L LE edema, Radial pulses 2+, Dorsalis pedis pulses 2+, Capillary refill <3 seconds  Resp:  Non labored. Air entry equal bilaterally. No wheezes rhonchi or rales  Abd:  Soft, NT/ND, +BS throughout  Ext:  Nml temp and moisture to touch.   Neuro:  A/O x3. Strength and sensation intact throughout.    Best Practices  Sedation:   N/A  Analgesia:   N/A  SBT:    N/A  Head of Bed:   30 degrees  DVT Proph.:   SCDs (sequential compression devices) and heparin  Nutrition:   oral intake  Glycemic Control:  none  Line Necessity:  Has a right sided port for chemo, PIV   Ulcer proph:   Not indicated    Pertinent Reviewed:  Allergies, Medications, Labs, Imaging and Physician and Nursing Notes     Patient seen and examined with Alok Soto MD who agrees with the above assessment and plan.     Maksim Benavides MD  PGY-I, Internal Medicine/[facility] Anesthesiology   Pager: [phone]    ICU Attending Addendum and Attestaion:    The patient was seen and examined  CT chest reviewed  In sinus rhythm  Cardiology consult appreciated  Continue heparin  Will likely be treated with Xarelto  Oncology consult  Might need a stress test at some point  Continue supportive care  Prognosis is guarded    I ALOK SOTO M.D, have personally seen the patient, and performed the physical  Examination on this patient, I have personally reviewed all pertinent data, I have jointly formulated the assessment and plan with Maksim Benavides MD , and I agree with his/her  note as documented above  and annotated personally by me.     The patient's cares and treatment plan were discussed with Patient/Family, Nursing, Respiratory therapist, Pharmacist, Dietician, Social Worke.    Critical care services I provided [id number] ([facility] hospital care, level III).    Electronically Signed: 10/10/2019        Alok Soto MD  [facility]  Pager: [phone]

## 2024-04-04 NOTE — TELEPHONE ENCOUNTER
Altaf San PA-C- See pt's [employer] message with picture. Please review and advise.     Routed to Altaf MEAD RN   PAL (Patient Advocate Liaison)  [facility]

## 2024-04-09 ENCOUNTER — HOSPITAL ENCOUNTER (INPATIENT)
Facility: CLINIC | Age: 89
LOS: 3 days | Discharge: NURSING, REHAB OR LONG-TERM CARE | DRG: 166 | End: 2024-04-12
Attending: EMERGENCY MEDICINE | Admitting: STUDENT IN AN ORGANIZED HEALTH CARE EDUCATION/TRAINING PROGRAM
Payer: MEDICARE

## 2024-04-09 ENCOUNTER — APPOINTMENT (OUTPATIENT)
Dept: GENERAL RADIOLOGY | Facility: CLINIC | Age: 89
DRG: 166 | End: 2024-04-09
Attending: EMERGENCY MEDICINE
Payer: MEDICARE

## 2024-04-09 ENCOUNTER — APPOINTMENT (OUTPATIENT)
Dept: CT IMAGING | Facility: CLINIC | Age: 89
DRG: 166 | End: 2024-04-09
Payer: MEDICARE

## 2024-04-09 ENCOUNTER — HOSPITAL ENCOUNTER (OUTPATIENT)
Dept: WOUND CARE | Facility: CLINIC | Age: 89
Discharge: HOME | DRG: 166 | End: 2024-04-09
Attending: FAMILY MEDICINE | Admitting: FAMILY MEDICINE
Payer: MEDICARE

## 2024-04-09 ENCOUNTER — APPOINTMENT (OUTPATIENT)
Dept: GENERAL RADIOLOGY | Facility: CLINIC | Age: 89
DRG: 166 | End: 2024-04-09
Payer: MEDICARE

## 2024-04-09 ENCOUNTER — APPOINTMENT (OUTPATIENT)
Dept: CT IMAGING | Facility: CLINIC | Age: 89
DRG: 166 | End: 2024-04-09
Attending: EMERGENCY MEDICINE
Payer: MEDICARE

## 2024-04-09 VITALS
HEIGHT: 60 IN | WEIGHT: 115 LBS | BODY MASS INDEX: 22.58 KG/M2 | DIASTOLIC BLOOD PRESSURE: 83 MMHG | HEART RATE: 70 BPM | TEMPERATURE: 98.2 F | SYSTOLIC BLOOD PRESSURE: 178 MMHG

## 2024-04-09 DIAGNOSIS — S81.802A WOUND OF LEFT LOWER EXTREMITY, INITIAL ENCOUNTER: ICD-10-CM

## 2024-04-09 DIAGNOSIS — L03.115 CELLULITIS OF RIGHT LOWER EXTREMITY: ICD-10-CM

## 2024-04-09 DIAGNOSIS — L97.922 SKIN ULCER OF LEFT LOWER LEG WITH FAT LAYER EXPOSED (H): ICD-10-CM

## 2024-04-09 DIAGNOSIS — R53.1 WEAKNESS: ICD-10-CM

## 2024-04-09 DIAGNOSIS — R06.02 SHORTNESS OF BREATH: ICD-10-CM

## 2024-04-09 DIAGNOSIS — R29.6 MULTIPLE FALLS: ICD-10-CM

## 2024-04-09 DIAGNOSIS — S22.41XA CLOSED FRACTURE OF MULTIPLE RIBS OF RIGHT SIDE, INITIAL ENCOUNTER: Primary | ICD-10-CM

## 2024-04-09 DIAGNOSIS — R60.0 LOWER EXTREMITY EDEMA: ICD-10-CM

## 2024-04-09 DIAGNOSIS — N17.9 ACUTE RENAL FAILURE SUPERIMPOSED ON CHRONIC KIDNEY DISEASE, UNSPECIFIED ACUTE RENAL FAILURE TYPE, UNSPECIFIED CKD STAGE (H): ICD-10-CM

## 2024-04-09 DIAGNOSIS — R79.89 ELEVATED TROPONIN: ICD-10-CM

## 2024-04-09 DIAGNOSIS — N18.9 ACUTE RENAL FAILURE SUPERIMPOSED ON CHRONIC KIDNEY DISEASE, UNSPECIFIED ACUTE RENAL FAILURE TYPE, UNSPECIFIED CKD STAGE (H): ICD-10-CM

## 2024-04-09 DIAGNOSIS — S81.801A WOUND OF RIGHT LOWER EXTREMITY, INITIAL ENCOUNTER: ICD-10-CM

## 2024-04-09 DIAGNOSIS — L97.912 ULCER OF RIGHT LOWER EXTREMITY, WITH FAT LAYER EXPOSED (H): Primary | ICD-10-CM

## 2024-04-09 LAB
ALBUMIN SERPL BCG-MCNC: 4.3 G/DL (ref 3.5–5.2)
ALP SERPL-CCNC: 64 U/L (ref 40–150)
ALT SERPL W P-5'-P-CCNC: 23 U/L (ref 0–50)
ANION GAP SERPL CALCULATED.3IONS-SCNC: 9 MMOL/L (ref 7–15)
AST SERPL W P-5'-P-CCNC: 35 U/L (ref 0–45)
BASE EXCESS BLDV CALC-SCNC: 3.1 MMOL/L (ref -3–3)
BASOPHILS # BLD AUTO: 0 10E3/UL (ref 0–0.2)
BASOPHILS NFR BLD AUTO: 0 %
BILIRUB SERPL-MCNC: 0.5 MG/DL
BUN SERPL-MCNC: 32.5 MG/DL (ref 8–23)
CALCIUM SERPL-MCNC: 10.1 MG/DL (ref 8.2–9.6)
CHLORIDE SERPL-SCNC: 97 MMOL/L (ref 98–107)
CREAT SERPL-MCNC: 1.19 MG/DL (ref 0.51–0.95)
DEPRECATED HCO3 PLAS-SCNC: 27 MMOL/L (ref 22–29)
EGFRCR SERPLBLD CKD-EPI 2021: 41 ML/MIN/1.73M2
EOSINOPHIL # BLD AUTO: 0.2 10E3/UL (ref 0–0.7)
EOSINOPHIL NFR BLD AUTO: 3 %
ERYTHROCYTE [DISTWIDTH] IN BLOOD BY AUTOMATED COUNT: 12.4 % (ref 10–15)
FLUAV RNA SPEC QL NAA+PROBE: NEGATIVE
FLUBV RNA RESP QL NAA+PROBE: NEGATIVE
GLUCOSE SERPL-MCNC: 114 MG/DL (ref 70–99)
HCO3 BLDV-SCNC: 29 MMOL/L (ref 21–28)
HCT VFR BLD AUTO: 32.7 % (ref 35–47)
HGB BLD-MCNC: 11.1 G/DL (ref 11.7–15.7)
HOLD SPECIMEN: NORMAL
HOLD SPECIMEN: NORMAL
IMM GRANULOCYTES # BLD: 0 10E3/UL
IMM GRANULOCYTES NFR BLD: 0 %
LYMPHOCYTES # BLD AUTO: 0.8 10E3/UL (ref 0.8–5.3)
LYMPHOCYTES NFR BLD AUTO: 14 %
MCH RBC QN AUTO: 34.3 PG (ref 26.5–33)
MCHC RBC AUTO-ENTMCNC: 33.9 G/DL (ref 31.5–36.5)
MCV RBC AUTO: 101 FL (ref 78–100)
MONOCYTES # BLD AUTO: 0.5 10E3/UL (ref 0–1.3)
MONOCYTES NFR BLD AUTO: 9 %
NEUTROPHILS # BLD AUTO: 4.3 10E3/UL (ref 1.6–8.3)
NEUTROPHILS NFR BLD AUTO: 74 %
NRBC # BLD AUTO: 0 10E3/UL
NRBC BLD AUTO-RTO: 0 /100
NT-PROBNP SERPL-MCNC: 600 PG/ML (ref 0–1800)
O2/TOTAL GAS SETTING VFR VENT: 0 %
OXYHGB MFR BLDV: 76 % (ref 70–75)
PCO2 BLDV: 47 MM HG (ref 40–50)
PH BLDV: 7.39 [PH] (ref 7.32–7.43)
PLATELET # BLD AUTO: 119 10E3/UL (ref 150–450)
PO2 BLDV: 43 MM HG (ref 25–47)
POTASSIUM SERPL-SCNC: 4.9 MMOL/L (ref 3.4–5.3)
PROT SERPL-MCNC: 5.9 G/DL (ref 6.4–8.3)
RBC # BLD AUTO: 3.24 10E6/UL (ref 3.8–5.2)
RSV RNA SPEC NAA+PROBE: NEGATIVE
SAO2 % BLDV: 77.2 % (ref 70–75)
SARS-COV-2 RNA RESP QL NAA+PROBE: NEGATIVE
SODIUM SERPL-SCNC: 133 MMOL/L (ref 135–145)
TROPONIN T SERPL HS-MCNC: 40 NG/L
TROPONIN T SERPL HS-MCNC: 41 NG/L
WBC # BLD AUTO: 5.9 10E3/UL (ref 4–11)

## 2024-04-09 PROCEDURE — 258N000003 HC RX IP 258 OP 636

## 2024-04-09 PROCEDURE — 82040 ASSAY OF SERUM ALBUMIN: CPT

## 2024-04-09 PROCEDURE — 84484 ASSAY OF TROPONIN QUANT: CPT

## 2024-04-09 PROCEDURE — 99204 OFFICE O/P NEW MOD 45 MIN: CPT | Mod: 25 | Performed by: FAMILY MEDICINE

## 2024-04-09 PROCEDURE — 97602 WOUND(S) CARE NON-SELECTIVE: CPT | Mod: XU

## 2024-04-09 PROCEDURE — 85025 COMPLETE CBC W/AUTO DIFF WBC: CPT

## 2024-04-09 PROCEDURE — 120N000001 HC R&B MED SURG/OB

## 2024-04-09 PROCEDURE — 11042 DBRDMT SUBQ TIS 1ST 20SQCM/<: CPT | Performed by: FAMILY MEDICINE

## 2024-04-09 PROCEDURE — 250N000011 HC RX IP 250 OP 636: Performed by: EMERGENCY MEDICINE

## 2024-04-09 PROCEDURE — 250N000009 HC RX 250: Performed by: EMERGENCY MEDICINE

## 2024-04-09 PROCEDURE — 36415 COLL VENOUS BLD VENIPUNCTURE: CPT

## 2024-04-09 PROCEDURE — 0JBQ0ZZ EXCISION OF RIGHT FOOT SUBCUTANEOUS TISSUE AND FASCIA, OPEN APPROACH: ICD-10-PCS | Performed by: FAMILY MEDICINE

## 2024-04-09 PROCEDURE — 99285 EMERGENCY DEPT VISIT HI MDM: CPT | Mod: 25

## 2024-04-09 PROCEDURE — 70450 CT HEAD/BRAIN W/O DYE: CPT | Mod: MA

## 2024-04-09 PROCEDURE — 73060 X-RAY EXAM OF HUMERUS: CPT | Mod: RT

## 2024-04-09 PROCEDURE — 87040 BLOOD CULTURE FOR BACTERIA: CPT | Performed by: PHYSICIAN ASSISTANT

## 2024-04-09 PROCEDURE — G0463 HOSPITAL OUTPT CLINIC VISIT: HCPCS | Mod: 25

## 2024-04-09 PROCEDURE — 82805 BLOOD GASES W/O2 SATURATION: CPT

## 2024-04-09 PROCEDURE — 83880 ASSAY OF NATRIURETIC PEPTIDE: CPT

## 2024-04-09 PROCEDURE — 72125 CT NECK SPINE W/O DYE: CPT | Mod: MA

## 2024-04-09 PROCEDURE — 87637 SARSCOV2&INF A&B&RSV AMP PRB: CPT | Performed by: EMERGENCY MEDICINE

## 2024-04-09 PROCEDURE — 36415 COLL VENOUS BLD VENIPUNCTURE: CPT | Performed by: PHYSICIAN ASSISTANT

## 2024-04-09 PROCEDURE — 93005 ELECTROCARDIOGRAM TRACING: CPT

## 2024-04-09 PROCEDURE — 71046 X-RAY EXAM CHEST 2 VIEWS: CPT

## 2024-04-09 PROCEDURE — 99223 1ST HOSP IP/OBS HIGH 75: CPT | Mod: AI | Performed by: PHYSICIAN ASSISTANT

## 2024-04-09 PROCEDURE — 71260 CT THORAX DX C+: CPT | Mod: MG

## 2024-04-09 RX ORDER — IOPAMIDOL 755 MG/ML
500 INJECTION, SOLUTION INTRAVASCULAR ONCE
Status: COMPLETED | OUTPATIENT
Start: 2024-04-09 | End: 2024-04-09

## 2024-04-09 RX ORDER — AMOXICILLIN 250 MG
1 CAPSULE ORAL 2 TIMES DAILY PRN
Status: DISCONTINUED | OUTPATIENT
Start: 2024-04-09 | End: 2024-04-12 | Stop reason: HOSPADM

## 2024-04-09 RX ORDER — ENOXAPARIN SODIUM 100 MG/ML
30 INJECTION SUBCUTANEOUS EVERY 24 HOURS
Status: DISCONTINUED | OUTPATIENT
Start: 2024-04-10 | End: 2024-04-12

## 2024-04-09 RX ORDER — METOPROLOL TARTRATE 50 MG
100 TABLET ORAL 2 TIMES DAILY
Status: DISCONTINUED | OUTPATIENT
Start: 2024-04-09 | End: 2024-04-12 | Stop reason: HOSPADM

## 2024-04-09 RX ORDER — POLYETHYLENE GLYCOL 400 2.5 MG/ML
1 SOLUTION/ DROPS OPHTHALMIC 2 TIMES DAILY PRN
COMMUNITY

## 2024-04-09 RX ORDER — AMOXICILLIN 250 MG
2 CAPSULE ORAL 2 TIMES DAILY PRN
Status: DISCONTINUED | OUTPATIENT
Start: 2024-04-09 | End: 2024-04-12 | Stop reason: HOSPADM

## 2024-04-09 RX ORDER — SODIUM CHLORIDE, SODIUM LACTATE, POTASSIUM CHLORIDE, CALCIUM CHLORIDE 600; 310; 30; 20 MG/100ML; MG/100ML; MG/100ML; MG/100ML
INJECTION, SOLUTION INTRAVENOUS CONTINUOUS
Status: ACTIVE | OUTPATIENT
Start: 2024-04-09 | End: 2024-04-10

## 2024-04-09 RX ORDER — ACETAMINOPHEN 325 MG/1
650 TABLET ORAL EVERY 4 HOURS PRN
Status: DISCONTINUED | OUTPATIENT
Start: 2024-04-09 | End: 2024-04-12 | Stop reason: HOSPADM

## 2024-04-09 RX ORDER — ASPIRIN 325 MG
325 TABLET ORAL DAILY
Status: DISCONTINUED | OUTPATIENT
Start: 2024-04-10 | End: 2024-04-12 | Stop reason: HOSPADM

## 2024-04-09 RX ORDER — ONDANSETRON 2 MG/ML
4 INJECTION INTRAMUSCULAR; INTRAVENOUS EVERY 6 HOURS PRN
Status: DISCONTINUED | OUTPATIENT
Start: 2024-04-09 | End: 2024-04-12 | Stop reason: HOSPADM

## 2024-04-09 RX ORDER — ONDANSETRON 4 MG/1
4 TABLET, ORALLY DISINTEGRATING ORAL EVERY 6 HOURS PRN
Status: DISCONTINUED | OUTPATIENT
Start: 2024-04-09 | End: 2024-04-12 | Stop reason: HOSPADM

## 2024-04-09 RX ORDER — CALCIUM CARBONATE 500 MG/1
1000 TABLET, CHEWABLE ORAL 4 TIMES DAILY PRN
Status: DISCONTINUED | OUTPATIENT
Start: 2024-04-09 | End: 2024-04-12 | Stop reason: HOSPADM

## 2024-04-09 RX ORDER — LIDOCAINE 40 MG/G
CREAM TOPICAL
Status: DISCONTINUED | OUTPATIENT
Start: 2024-04-09 | End: 2024-04-12 | Stop reason: HOSPADM

## 2024-04-09 RX ORDER — TIMOLOL MALEATE 5 MG/ML
1 SOLUTION/ DROPS OPHTHALMIC EVERY MORNING
Status: DISCONTINUED | OUTPATIENT
Start: 2024-04-10 | End: 2024-04-12 | Stop reason: HOSPADM

## 2024-04-09 RX ORDER — CARBOXYMETHYLCELLULOSE SODIUM 5 MG/ML
1 SOLUTION/ DROPS OPHTHALMIC 2 TIMES DAILY PRN
Status: DISCONTINUED | OUTPATIENT
Start: 2024-04-09 | End: 2024-04-12 | Stop reason: HOSPADM

## 2024-04-09 RX ORDER — ACETAMINOPHEN 650 MG/1
650 SUPPOSITORY RECTAL EVERY 4 HOURS PRN
Status: DISCONTINUED | OUTPATIENT
Start: 2024-04-09 | End: 2024-04-12 | Stop reason: HOSPADM

## 2024-04-09 RX ADMIN — SODIUM CHLORIDE 59 ML: 9 INJECTION, SOLUTION INTRAVENOUS at 21:26

## 2024-04-09 RX ADMIN — IOPAMIDOL 58 ML: 755 INJECTION, SOLUTION INTRAVENOUS at 21:26

## 2024-04-09 RX ADMIN — SODIUM CHLORIDE 500 ML: 9 INJECTION, SOLUTION INTRAVENOUS at 20:40

## 2024-04-09 ASSESSMENT — COLUMBIA-SUICIDE SEVERITY RATING SCALE - C-SSRS
2. HAVE YOU ACTUALLY HAD ANY THOUGHTS OF KILLING YOURSELF IN THE PAST MONTH?: NO
1. IN THE PAST MONTH, HAVE YOU WISHED YOU WERE DEAD OR WISHED YOU COULD GO TO SLEEP AND NOT WAKE UP?: NO
6. HAVE YOU EVER DONE ANYTHING, STARTED TO DO ANYTHING, OR PREPARED TO DO ANYTHING TO END YOUR LIFE?: NO

## 2024-04-09 ASSESSMENT — ACTIVITIES OF DAILY LIVING (ADL)
ADLS_ACUITY_SCORE: 38

## 2024-04-09 NOTE — PROGRESS NOTES
Wound Clinic Note         Visit date: 04/09/2024       Cheif Complaint:     Dayana Samano is a 89 year old   female had concerns including WOUND CARE.  The patient has lower extremity edema and bilateral leg ulcers.         HISTORY OF PRESENT ILLNESS:    Dayana Samano reports the wound has been present since March 21, 2024.  The wound began due to the area being bumped.   Prior to this she had 1 other difficult to heal wound on the left anterior shin due to a skin cancer removal.  He is accompanied to the wound clinic for her initial evaluation with me by her daughter who provides much of the history.  Her daughter is also been doing the dressing changes.    Her daughter has been bandaging the wounds with Telfa pads, an absorbent pad and roll gauze changed once a day.  There is been light serous drainage from the wounds.          The pateint denies fevers or chills.  They report the pain from the wound has been 0/10 and has remained about the same recently.      The patient reports they currently do not have any routine for elevating their legs.     She does take Lasix to help control her swelling.    Today the patient reports maintaining a regular diet without special attention to protein.        The patient denies a history of diabetes, smoking or chronic steroid use.         The patient has not had any symptoms of infection relating to the wound recently and is not currently on antibiotics.     She was recently prescribed an antibiotic but she is completed taking that now with no symptoms of an adverse reaction.    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       Surgical Hx:   Past Surgical History:   Procedure Laterality Date    FEMUR SURGERY Right 05/16/2017    nail; right intertrochanteric fracture    LAPAROSCOPIC APPENDECTOMY N/A 5/8/2020    Procedure: LAPAROSCOPIC APPENDECTOMY;  Surgeon: Spenser Kent MD;  Location: [facility] OR    ORIF ELBOW FRACTURE Left 01/01/2015    x2          Allergies:    Allergies   Allergen Reactions    Meperidine Nausea and Vomiting    Morphine Nausea and Vomiting    Hydromorphone Nausea and Vomiting    No Known Allergies               Medication History:    Current Outpatient Medications   Medication Sig Dispense Refill    acetaminophen (TYLENOL) 500 MG tablet Take 500-1,000 mg by mouth every 6 hours as needed for mild pain      amoxicillin (AMOXIL) 500 MG capsule TK FOUR CS PO 1 HOUR B DAPP      aspirin (ASA) 325 MG tablet Take 325 mg by mouth daily      CALCIUM 600 + D 600-200 MG-UNIT OR TABS Take 1 tablet by mouth daily  3 MONTHS 1 YEAR    doxycycline hyclate (VIBRAMYCIN) 100 MG capsule Take 1 capsule (100 mg) by mouth 2 times daily 14 capsule 0    furosemide (LASIX) 40 MG tablet Take 1 tablet (40 mg) by mouth daily 90 tablet 3    hypromellose (ARTIFICIAL TEARS) 0.5 % SOLN ophthalmic solution 1 drop every hour as needed for dry eyes      ketoconazole (NIZORAL) 2 % external cream Apply topically daily 30 g 1    ketoconazole (NIZORAL) 2 % external shampoo       latanoprost (XALATAN) 0.005 % ophthalmic solution Place 1 drop into both eyes daily      lisinopril (ZESTRIL) 40 MG tablet Take 1 tablet (40 mg) by mouth daily 90 tablet 3    metoprolol tartrate (LOPRESSOR) 100 MG tablet Take 1 tablet (100 mg) by mouth 2 times daily 180 tablet 3     multivitamin (OCUVITE) TABS tablet Take 1 tablet by mouth daily      potassium chloride ER (KLOR-CON M) 20 MEQ CR tablet Take 1 tablet (20 mEq) by mouth daily 90 tablet 3    timolol maleate (TIMOPTIC) 0.5 % ophthalmic solution Place 1 drop into both eyes every evening        Current Facility-Administered Medications   Medication Dose Route Frequency Provider Last Rate Last Admin    dexamethasone (DECADRON) injection 2 mg  2 mg   Los Stephenson MD   2 mg at 10/15/21 1111    lidocaine 1 % injection 4 mL  4 mL   Los Stephenson MD   4 mL at 10/15/21 1111         Tobacco History:  reports that she quit smoking about 34 years ago. Her smoking use included cigarettes. She has been exposed to tobacco smoke. She has never used smokeless tobacco.       REVIEW OF SYMPTOMS:   The review of systems was negative except as noted in the HPI.           PHYSICAL EXAMINATION:     BP (!) 178/83 (BP Location: Right arm, Patient Position: Chair)   Pulse 70   Temp 98.2  F (36.8  C) (Temporal)   Ht 1.524 m (5')   Wt 52.2 kg (115 lb)   BMI 22.46 kg/m             GENERAL: The patient overall appears well and is no acute distress.   HEAD: normocephalic   EYES: Sclera and conjunctiva clear   NECK: no obvious masses   LUNGS: breathing is unlabored.   EXTREMITIES: No clubbing, cyanosis or edema   SKIN: No rashes or other abnormalities except as noted under the Wound section below.   NEUROLOGICAL: normal motor and sensory function   EDEMA: Mild      WOUND: The wound appears healthy with no sign of infection.   Wound bed: necrotic material at the right medial ankle wound only.  Periwound: healthy intact skin  On the left lateral shin she has a skin tear.  On the right lower extremity she has 1 main wound on the right medial ankle and a few other smaller open areas that do not have necrotic material in them.      Also see below for wound details:       Circumferential volume measures:             No data to display                 Ulceration(s)/Wound(s):   Please see the media tab under the chart review for pictures of the wounds.  Nursing staff removed dressings and cleansed wound.    Wound (used by [facility] only) 04/09/24 1110 Right anterior;lower leg unspecified (Active)   Thickness/Stage full thickness 04/09/24 1100   Base red;slough 04/09/24 1100   Periwound blue/purple;edematous 04/09/24 1100   Periwound Temperature warm 04/09/24 1100   Periwound Skin Turgor soft 04/09/24 1100   Edges open 04/09/24 1100   Length (cm) 1.5 04/09/24 1100   Width (cm) 1.3 04/09/24 1100   Depth (cm) 0.3 04/09/24 1100   Wound (cm^2) 1.95 cm^2 04/09/24 1100   Wound Volume (cm^3) 0.59 cm^3 04/09/24 1100   Drainage Characteristics/Odor serosanguineous 04/09/24 1100   Drainage Amount moderate 04/09/24 1100   Care, Wound debrided 04/09/24 1100       Wound (used by [facility] only) 04/09/24 1110 Right lateral;lower leg unspecified (Active)   Thickness/Stage full thickness 04/09/24 1100   Base red;slough 04/09/24 1100   Periwound blue/purple;edematous 04/09/24 1100   Periwound Temperature warm 04/09/24 1100   Periwound Skin Turgor soft 04/09/24 1100   Edges open 04/09/24 1100   Length (cm) 0.6 04/09/24 1100   Width (cm) 0.6 04/09/24 1100   Depth (cm) 0.1 04/09/24 1100   Wound (cm^2) 0.36 cm^2 04/09/24 1100   Wound Volume (cm^3) 0.04 cm^3 04/09/24 1100   Drainage Characteristics/Odor serosanguineous 04/09/24 1100   Drainage Amount moderate 04/09/24 1100   Care, Wound non-select wound debridement performed. 04/09/24 1100       Wound (used by [facility] only) 04/09/24 1110 Left lateral;lower leg other (see comments) (Active)   Thickness/Stage full thickness 04/09/24 1100   Base red;granulating 04/09/24 1100   Periwound edematous;blue/purple 04/09/24 1100   Periwound Temperature warm 04/09/24 1100   Periwound Skin Turgor soft 04/09/24 1100   Edges open 04/09/24 1100   Length (cm) 3.5 04/09/24 1100   Width (cm) 3.5 04/09/24 1100   Depth (cm) 0.1 04/09/24 1100   Wound (cm^2)  12.25 cm^2 04/09/24 1100   Wound Volume (cm^3) 1.23 cm^3 04/09/24 1100   Drainage Characteristics/Odor serosanguineous 04/09/24 1100   Drainage Amount moderate 04/09/24 1100   Care, Wound non-select wound debridement performed. 04/09/24 1100             Recent Labs   Lab Test 05/09/20  0909   A1C 5.4          Recent Labs   Lab Test 08/02/23  1521 07/12/22  1629 06/10/21  1547   ALBUMIN 4.7 4.3 4.4              Procedure note:  Informed Consent:  Patient acknowledges that I have explained the patient's general medical condition to him/her.  Patient has been informed and acknowledges that I have explained the risks or complications of wound debridement including, but not limited to, scarring, damage to blood vessels or surrounding areas such as nerves and organs, allergic reactions to topical and injected anaesthetic and/or skin prep solutions.  Other risks include excessive bleeding, removal of healthy tissue, infection, pain and inflammation, and prolonged or failure to heal.  Patient acknowledges that bleeding after debridement and pain may worsen after debridement and that dead/necrotic tissue may cause bacteria and toxins to be released into the bloodstream and cause sepsis or shock.  Patient acknowledges that I have explained that the wound may be larger after debridement.  Patient acknowledges that they may need serial debridements while under care in the wound department.  Patient acknowledges that they were given an opportunity to ask questions about treatment and I have answered patient's questions.    Anesthetized as needed with lidocaine.  Using a curette and/or a scalpel I performed an excisional debridement removing all necrotic material at the right medial ankle wound in to the level of viable subcutaneous tissue.  I obtained hemostasis with direct pressure.  The patient tolerated the procedure well.  EBL: <5 ml  Total debridement surface area: Less than 20 cm   The other wounds did not require  debridement.              ASSESSMENT:   This is a 89 year old  female with bilateral leg ulcers, the patient also has lower extremity edema which was also managed during today's clinic visit.          PLAN:   We'll bandage the open areas with Adaptic, an ABD pad and a spandagrip stocking change once a day or every other day depending on the drainage.    Separate from the wound care instructions we then discussed management strategies for lower extremity edema.  I explained the keys for managing lower extremity edema are compression and elevation.  I explained to the patient today that controlling the edema is probably the most important thing we can do to help heal the wound.  I have specifically recommended that they lay down with their legs above the level of the heart for 30 minutes at least twice a day.     I have explained to the patient the importance of protein intake to wound healing.  I have explained that increasing protein intake will speed wound healing.  We discussed several types of food that are high in protein and the wound care nurse gave the patient a handout that summarizes this information.  In addition to further speed wound healing I have encouraged the patient to take a protein supplement.   The patient will return to the wound clinic in 2 to 3 weeks to see me again.        45 minutes spent on the date of the encounter doing chart review, history and exam, documentation and further activities per the note, this time excludes any procedure time      Jaya Giordano MD  04/09/2024   12:05 PM   [facility]  [phone]    This note was electronically signed by Jaya Giordano MD        Further instructions from your care team         04/09/2024   Dayana Samano   8/30/1924    A DME order for supplies has been placed to [employer]. If there are any issues with your order including not receiving the order please call [employer] at [phone]. They can also  "provide a tracking number for you.    Dressing changes outside of clinic are being performed by Family    Resides at [facility]    Wound Dressing Change: Right anterior lower leg, Right lateral lower leg, and Left lateral lower leg  - Wash your hands with soap and water before you begin your dressing change and prepare a clean surface for dressings.  -Cleanse with mild unscented soap and water (such as Cetaphil, Cerave or Dove)   -Primary dressing: Apply 1/2 piece of 3x3\" Adaptic to each leg (cut to fit the wound beds)   -Secondary dressing: Apply 1/4 5x9 ABD pad to each wound  -Secure with 1/2 conforming roll gauze to each leg and secure with Medipore tape  -Pull up spandagrip size E from toes to behind the knee  -Change every other day    Compression:   Your compression is Spandagrip E and can be removed at night and put back on first thing in the morning.   Please remove compression dressing if toes turn blue and/or tingle and can not be relieved by raising the leg for one hour. If unable to reapply in the morning, keep compression on until next dressing change.    Walk as much as you can, as you are able. Whenever you sit raise your ankle above your hips to promote wound healing.       Elevation:  It is recommended that you elevate your legs above the level of your heart for 30 minutes: approximately 2-3 times each day   Ways to do this:   - Lay on the couch or your bed and prop your legs up on pillows   - Recline back as far as you can go in your recliner and prop your legs on pillows.   Doing these things will help reduce the edema in your legs.          A diet high in protein is important for wound healing, we recommend getting 90 grams of protein per day. Taking protein shakes or bars are a good way to get extra protein in your diet.     Good sources of protein:  Pork 26g per 3 oz  Whey protein powder - 24g per scoop (on average)  Greek yogurt - 23g per 8oz   Chicken " or Turkey - 23g per 3oz  Fish - 20-25g per 3oz  Beef - 18-23g per 3oz  Tofu - 10g per 1/2 cup  Navy beans - 20g per cup  Cottage cheese - 14g per 1/2 cup   Lentils - 13g per 1/4 cup  Beef jerky 13g per 1oz  2% milk - 8g per cup  Peanut butter - 8g per 2 tablespoons  Eggs - 6g per egg  Mixed nuts - 6g per 2oz         Main Provider: Jaya Giordano M.D. April 9, 2024    Call us at [phone] if you have any questions about your wounds, if you have redness or swelling around your wound, have a fever of 101 degrees Fahrenheit or greater or if you have any other problems or concerns. We answer the phone Monday through Friday 8 am to 4 pm, please leave a message as we check the voicemail frequently throughout the day. If you have a concern over the weekend, please leave a message and we will return your call Monday. If the need is urgent, go to the ER or urgent care.    If you had a positive experience please indicate that on your patient satisfaction survey form that [facility] will be sending you.    It was a pleasure meeting with you today.  Thank you for allowing me and my team the privilege of caring for you today.  YOU are the reason we are here, and I truly hope we provided you with the excellent service you deserve.  Please let us know if there is anything else we can do for you so that we can be sure you are leaving completely satisfied with your care experience.      If you have any billing related questions please call the [facility] Business office at [phone]. The clinic staff does not handle billing related matters.    If you are scheduled to have a follow up appointment, you will receive a reminder call the day before your visit. On the appointment day please arrive 15 minutes prior to your appointment time. If you are unable to keep that appointment, please call the clinic to cancel or reschedule. If you are more than 10 minutes late or greater for your scheduled appointment time, the clinic  policy is that you may be asked to reschedule.         ,

## 2024-04-09 NOTE — DISCHARGE INSTRUCTIONS
"04/09/2024   Dayana Samano   8/30/1924    A DME order for supplies has been placed to [employer]. If there are any issues with your order including not receiving the order please call [employer] at [phone]. They can also provide a tracking number for you.    Dressing changes outside of clinic are being performed by Family    Resides at [facility]    Wound Dressing Change: Right anterior lower leg, Right lateral lower leg, and Left lateral lower leg  - Wash your hands with soap and water before you begin your dressing change and prepare a clean surface for dressings.  -Cleanse with mild unscented soap and water (such as Cetaphil, Cerave or Dove)   -Primary dressing: Apply 1/2 piece of 3x3\" Adaptic to each leg (cut to fit the wound beds)   -Secondary dressing: Apply 1/4 5x9 ABD pad to each wound  -Secure with 1/2 conforming roll gauze to each leg and secure with Medipore tape  -Pull up spandagrip size E from toes to behind the knee  -Change every other day    Compression:   Your compression is Spandagrip E and can be removed at night and put back on first thing in the morning.   Please remove compression dressing if toes turn blue and/or tingle and can not be relieved by raising the leg for one hour. If unable to reapply in the morning, keep compression on until next dressing change.    Walk as much as you can, as you are able. Whenever you sit raise your ankle above your hips to promote wound healing.       Elevation:  It is recommended that you elevate your legs above the level of your heart for 30 minutes: approximately 2-3 times each day   Ways to do this:   - Lay on the couch or your bed and prop your legs up on pillows   - Recline back as far as you can go in your recliner and prop your legs on pillows.   Doing these things will help reduce the edema in your legs.          A diet high in protein is important for wound healing, we recommend getting 90 grams of " protein per day. Taking protein shakes or bars are a good way to get extra protein in your diet.     Good sources of protein:  Pork 26g per 3 oz  Whey protein powder - 24g per scoop (on average)  Greek yogurt - 23g per 8oz   Chicken or Turkey - 23g per 3oz  Fish - 20-25g per 3oz  Beef - 18-23g per 3oz  Tofu - 10g per 1/2 cup  Navy beans - 20g per cup  Cottage cheese - 14g per 1/2 cup   Lentils - 13g per 1/4 cup  Beef jerky 13g per 1oz  2% milk - 8g per cup  Peanut butter - 8g per 2 tablespoons  Eggs - 6g per egg  Mixed nuts - 6g per 2oz         Main Provider: Jaya Giordano M.D. April 9, 2024    Call us at [phone] if you have any questions about your wounds, if you have redness or swelling around your wound, have a fever of 101 degrees Fahrenheit or greater or if you have any other problems or concerns. We answer the phone Monday through Friday 8 am to 4 pm, please leave a message as we check the voicemail frequently throughout the day. If you have a concern over the weekend, please leave a message and we will return your call Monday. If the need is urgent, go to the ER or urgent care.    If you had a positive experience please indicate that on your patient satisfaction survey form that [facility] will be sending you.    It was a pleasure meeting with you today.  Thank you for allowing me and my team the privilege of caring for you today.  YOU are the reason we are here, and I truly hope we provided you with the excellent service you deserve.  Please let us know if there is anything else we can do for you so that we can be sure you are leaving completely satisfied with your care experience.      If you have any billing related questions please call the [facility] Business office at [phone]. The clinic staff does not handle billing related matters.    If you are scheduled to have a follow up appointment, you will receive a reminder call the day before your visit. On the appointment day please  arrive 15 minutes prior to your appointment time. If you are unable to keep that appointment, please call the clinic to cancel or reschedule. If you are more than 10 minutes late or greater for your scheduled appointment time, the clinic policy is that you may be asked to reschedule.

## 2024-04-09 NOTE — TELEPHONE ENCOUNTER
We dont know that she is dying, so I dont think hospice can be done.  She should be evaluated to find out what is going on at least, then the decision can be made.    That being said, if she is not wanting to go in there is nothing we can do, but I dont think hospice can be ordered without being seen by a provider    (1) Female

## 2024-04-09 NOTE — PROGRESS NOTES
Patient arrived for wound care visit. Certified Wound Care Nurse time spent evaluating patient record, completed a full evaluation and documented wound(s) & nazia-wound skin; provided recommendation based on treatment plan. Applied dressing, reviewed discharge instructions, patient education, and discussed plan of care with appropriate medical team staff members and patient and/or family members.

## 2024-04-09 NOTE — ED TRIAGE NOTES
"Pt is complaining of SOB that started today. Pt was at a clinic to have leg ulcers looked at by her primary DR. They noticed the shortness of breath, had an SPO2 reading of 90, and recommended she come in. Pt does have a history of CHF.     Over the last 1/2 weeks, Pt has been increasingly weak. Pt has fallen multiple times per family. \"She hasn't hit her head, but she will be walking and get too weak to continue, lean against the wall and slid down. On Saturday she stood up from toilet and became too weak, needed to slide to the ground.\"     Pt is complaining of right shoulder pain with decreased mobility post falls. Is not on blood thinners.         "

## 2024-04-09 NOTE — TELEPHONE ENCOUNTER
Advise ED given age and symptoms.  Tachycardia of 116 would have me concerned about possible atrial fibrillation, PE, CHF, etc.  Message sent in [employer].

## 2024-04-09 NOTE — TELEPHONE ENCOUNTER
Called daughter and discussed.  Daughter feels patient will not want to go to ER.  Discussed she does need to discuss with patient and discuss specific concerns and that some of the concerns can be fixed with medication.  Discussed if patient able to make her own decisions she needs to decide on what care she does or does not want.  Advised she does need to discuss with her today and Dayana does need to go today to ER if she is willing.  Daughter feels Dayana would just want Hospice.  Wondering about how to go about that.  Discussed likely would need face to face visit for insurance to cover and then when ordered Hospice would need to come so could be next week and concerned with breathing and fall safety before this would happen.  Strongly encouraged ER for any easily fixable issues with medications or IV's and can certainly look into Hospice but worried about her falling and breaking something and being in hospital which she wants to avoid.  Do you want me to call patient to discuss?  Please advise.  Flaquita King RN

## 2024-04-10 ENCOUNTER — APPOINTMENT (OUTPATIENT)
Dept: CARDIOLOGY | Facility: CLINIC | Age: 89
DRG: 166 | End: 2024-04-10
Attending: PHYSICIAN ASSISTANT
Payer: MEDICARE

## 2024-04-10 ENCOUNTER — APPOINTMENT (OUTPATIENT)
Dept: PHYSICAL THERAPY | Facility: CLINIC | Age: 89
DRG: 166 | End: 2024-04-10
Attending: PHYSICIAN ASSISTANT
Payer: MEDICARE

## 2024-04-10 ENCOUNTER — APPOINTMENT (OUTPATIENT)
Dept: GENERAL RADIOLOGY | Facility: CLINIC | Age: 89
DRG: 166 | End: 2024-04-10
Attending: SURGERY
Payer: MEDICARE

## 2024-04-10 LAB
ALBUMIN UR-MCNC: NEGATIVE MG/DL
ANION GAP SERPL CALCULATED.3IONS-SCNC: 10 MMOL/L (ref 7–15)
APPEARANCE UR: CLEAR
ATRIAL RATE - MUSE: 70 BPM
BILIRUB UR QL STRIP: NEGATIVE
BUN SERPL-MCNC: 23.4 MG/DL (ref 8–23)
CALCIUM SERPL-MCNC: 9.6 MG/DL (ref 8.2–9.6)
CHLORIDE SERPL-SCNC: 100 MMOL/L (ref 98–107)
COLOR UR AUTO: ABNORMAL
CREAT SERPL-MCNC: 0.99 MG/DL (ref 0.51–0.95)
DEPRECATED HCO3 PLAS-SCNC: 24 MMOL/L (ref 22–29)
DIASTOLIC BLOOD PRESSURE - MUSE: NORMAL MMHG
EGFRCR SERPLBLD CKD-EPI 2021: 51 ML/MIN/1.73M2
ERYTHROCYTE [DISTWIDTH] IN BLOOD BY AUTOMATED COUNT: 12.4 % (ref 10–15)
GLUCOSE SERPL-MCNC: 101 MG/DL (ref 70–99)
GLUCOSE UR STRIP-MCNC: NEGATIVE MG/DL
HCT VFR BLD AUTO: 32.4 % (ref 35–47)
HGB BLD-MCNC: 10.7 G/DL (ref 11.7–15.7)
HGB UR QL STRIP: NEGATIVE
INTERPRETATION ECG - MUSE: NORMAL
KETONES UR STRIP-MCNC: NEGATIVE MG/DL
LEUKOCYTE ESTERASE UR QL STRIP: NEGATIVE
LVEF ECHO: NORMAL
MAGNESIUM SERPL-MCNC: 2.2 MG/DL (ref 1.7–2.3)
MCH RBC QN AUTO: 33.8 PG (ref 26.5–33)
MCHC RBC AUTO-ENTMCNC: 33 G/DL (ref 31.5–36.5)
MCV RBC AUTO: 102 FL (ref 78–100)
NITRATE UR QL: NEGATIVE
P AXIS - MUSE: 79 DEGREES
PH UR STRIP: 7.5 [PH] (ref 5–7)
PHOSPHATE SERPL-MCNC: 2.2 MG/DL (ref 2.5–4.5)
PLATELET # BLD AUTO: 106 10E3/UL (ref 150–450)
POTASSIUM SERPL-SCNC: 4.7 MMOL/L (ref 3.4–5.3)
PR INTERVAL - MUSE: 148 MS
QRS DURATION - MUSE: 82 MS
QT - MUSE: 386 MS
QTC - MUSE: 416 MS
R AXIS - MUSE: 59 DEGREES
RBC # BLD AUTO: 3.17 10E6/UL (ref 3.8–5.2)
RBC URINE: <1 /HPF
SODIUM SERPL-SCNC: 134 MMOL/L (ref 135–145)
SP GR UR STRIP: 1 (ref 1–1.03)
SYSTOLIC BLOOD PRESSURE - MUSE: NORMAL MMHG
T AXIS - MUSE: 63 DEGREES
TRANSITIONAL EPI: 1 /HPF
UROBILINOGEN UR STRIP-MCNC: NORMAL MG/DL
VENTRICULAR RATE- MUSE: 70 BPM
WBC # BLD AUTO: 4.5 10E3/UL (ref 4–11)
WBC URINE: 1 /HPF
YEAST #/AREA URNS HPF: ABNORMAL /HPF

## 2024-04-10 PROCEDURE — 83735 ASSAY OF MAGNESIUM: CPT | Performed by: STUDENT IN AN ORGANIZED HEALTH CARE EDUCATION/TRAINING PROGRAM

## 2024-04-10 PROCEDURE — 81001 URINALYSIS AUTO W/SCOPE: CPT | Performed by: PHYSICIAN ASSISTANT

## 2024-04-10 PROCEDURE — 250N000013 HC RX MED GY IP 250 OP 250 PS 637: Performed by: STUDENT IN AN ORGANIZED HEALTH CARE EDUCATION/TRAINING PROGRAM

## 2024-04-10 PROCEDURE — 97161 PT EVAL LOW COMPLEX 20 MIN: CPT | Mod: GP | Performed by: PHYSICAL THERAPIST

## 2024-04-10 PROCEDURE — 97530 THERAPEUTIC ACTIVITIES: CPT | Mod: GP | Performed by: PHYSICAL THERAPIST

## 2024-04-10 PROCEDURE — 99222 1ST HOSP IP/OBS MODERATE 55: CPT | Performed by: SURGERY

## 2024-04-10 PROCEDURE — 250N000011 HC RX IP 250 OP 636: Performed by: PHYSICIAN ASSISTANT

## 2024-04-10 PROCEDURE — 97116 GAIT TRAINING THERAPY: CPT | Mod: GP | Performed by: PHYSICAL THERAPIST

## 2024-04-10 PROCEDURE — 999N000157 HC STATISTIC RCP TIME EA 10 MIN

## 2024-04-10 PROCEDURE — 250N000009 HC RX 250: Performed by: PHYSICIAN ASSISTANT

## 2024-04-10 PROCEDURE — 36415 COLL VENOUS BLD VENIPUNCTURE: CPT | Performed by: PHYSICIAN ASSISTANT

## 2024-04-10 PROCEDURE — 120N000001 HC R&B MED SURG/OB

## 2024-04-10 PROCEDURE — 94150 VITAL CAPACITY TEST: CPT

## 2024-04-10 PROCEDURE — 250N000013 HC RX MED GY IP 250 OP 250 PS 637: Performed by: EMERGENCY MEDICINE

## 2024-04-10 PROCEDURE — 93306 TTE W/DOPPLER COMPLETE: CPT

## 2024-04-10 PROCEDURE — 250N000013 HC RX MED GY IP 250 OP 250 PS 637: Performed by: PHYSICIAN ASSISTANT

## 2024-04-10 PROCEDURE — 85027 COMPLETE CBC AUTOMATED: CPT | Performed by: PHYSICIAN ASSISTANT

## 2024-04-10 PROCEDURE — 99232 SBSQ HOSP IP/OBS MODERATE 35: CPT | Performed by: STUDENT IN AN ORGANIZED HEALTH CARE EDUCATION/TRAINING PROGRAM

## 2024-04-10 PROCEDURE — 80048 BASIC METABOLIC PNL TOTAL CA: CPT | Performed by: PHYSICIAN ASSISTANT

## 2024-04-10 PROCEDURE — 84100 ASSAY OF PHOSPHORUS: CPT | Performed by: STUDENT IN AN ORGANIZED HEALTH CARE EDUCATION/TRAINING PROGRAM

## 2024-04-10 PROCEDURE — 93306 TTE W/DOPPLER COMPLETE: CPT | Mod: 26 | Performed by: INTERNAL MEDICINE

## 2024-04-10 PROCEDURE — 258N000003 HC RX IP 258 OP 636: Performed by: PHYSICIAN ASSISTANT

## 2024-04-10 PROCEDURE — 73030 X-RAY EXAM OF SHOULDER: CPT | Mod: RT

## 2024-04-10 RX ORDER — LIDOCAINE 4 G/G
1 PATCH TOPICAL
Status: DISCONTINUED | OUTPATIENT
Start: 2024-04-10 | End: 2024-04-12 | Stop reason: HOSPADM

## 2024-04-10 RX ORDER — ACETAMINOPHEN 325 MG/1
975 TABLET ORAL EVERY 8 HOURS SCHEDULED
Status: DISCONTINUED | OUTPATIENT
Start: 2024-04-10 | End: 2024-04-12 | Stop reason: HOSPADM

## 2024-04-10 RX ADMIN — SODIUM CHLORIDE, POTASSIUM CHLORIDE, SODIUM LACTATE AND CALCIUM CHLORIDE: 600; 310; 30; 20 INJECTION, SOLUTION INTRAVENOUS at 03:09

## 2024-04-10 RX ADMIN — LIDOCAINE 1 PATCH: 560 PATCH PERCUTANEOUS; TOPICAL; TRANSDERMAL at 11:39

## 2024-04-10 RX ADMIN — ASPIRIN 325 MG ORAL TABLET 325 MG: 325 PILL ORAL at 07:55

## 2024-04-10 RX ADMIN — TIMOLOL MALEATE 1 DROP: 5 SOLUTION/ DROPS OPHTHALMIC at 07:55

## 2024-04-10 RX ADMIN — METOPROLOL TARTRATE 100 MG: 50 TABLET, FILM COATED ORAL at 20:13

## 2024-04-10 RX ADMIN — POTASSIUM & SODIUM PHOSPHATES POWDER PACK 280-160-250 MG 1 PACKET: 280-160-250 PACK at 11:39

## 2024-04-10 RX ADMIN — ENOXAPARIN SODIUM 30 MG: 30 INJECTION SUBCUTANEOUS at 07:55

## 2024-04-10 RX ADMIN — ACETAMINOPHEN 975 MG: 325 TABLET, FILM COATED ORAL at 11:38

## 2024-04-10 RX ADMIN — POTASSIUM & SODIUM PHOSPHATES POWDER PACK 280-160-250 MG 1 PACKET: 280-160-250 PACK at 14:17

## 2024-04-10 RX ADMIN — ACETAMINOPHEN 975 MG: 325 TABLET, FILM COATED ORAL at 14:16

## 2024-04-10 RX ADMIN — METOPROLOL TARTRATE 100 MG: 50 TABLET, FILM COATED ORAL at 07:55

## 2024-04-10 ASSESSMENT — ACTIVITIES OF DAILY LIVING (ADL)
ADLS_ACUITY_SCORE: 50
ADLS_ACUITY_SCORE: 47
ADLS_ACUITY_SCORE: 50
ADLS_ACUITY_SCORE: 49
ADLS_ACUITY_SCORE: 38
ADLS_ACUITY_SCORE: 50
ADLS_ACUITY_SCORE: 42
ADLS_ACUITY_SCORE: 49
ADLS_ACUITY_SCORE: 50
ADLS_ACUITY_SCORE: 50
ADLS_ACUITY_SCORE: 38
ADLS_ACUITY_SCORE: 42
ADLS_ACUITY_SCORE: 46
ADLS_ACUITY_SCORE: 50
ADLS_ACUITY_SCORE: 47
ADLS_ACUITY_SCORE: 46
ADLS_ACUITY_SCORE: 49
DEPENDENT_IADLS:: INDEPENDENT
ADLS_ACUITY_SCORE: 42
ADLS_ACUITY_SCORE: 50
ADLS_ACUITY_SCORE: 47
ADLS_ACUITY_SCORE: 49
ADLS_ACUITY_SCORE: 42
ADLS_ACUITY_SCORE: 46

## 2024-04-10 NOTE — PROGRESS NOTES
[facility] [place]    Medicine Progress Note - Hospitalist Service    Date of Admission:  4/9/2024    Assessment & Plan      Dayana Samano is a 89 year old female with history of CVA and hypertension who lives independently and ambulates with a walker admitted on 4/9/2024 with increased generalized weakness, 3 falls in the last couple days, lack of appetite and shortness of breath.     In the ED she was afebrile with heart rate of 77, pressure 132/64 and breathing 22 breaths/min at 92% oxygen saturation.  The ED stated she got all the way down to 90% oxygen saturation and they put on 2 L of oxygen with improvement.  Lab work remarkable for creatinine 1.19, BUN 32.5, sodium 133, calcium 10.1, normal LFTs, glucose 114, , high-sensitivity troponin of 41 and CBC remarkable for WBC 5.9 and hemoglobin 11.1.  COVID/influenza/RSV are negative.  CT head and CT cervical spine are negative.  Right humerus x-ray does not show any fracture and chest x-ray shows cardiomegaly, and asymmetric density of the right upper lobe and mediastinum that is indeterminant.  Also seen are old left rib fractures.  CT of the chest was ordered in the ED and pending at this time and she was given 500 mL of fluids with admission request.    Shortness of breath with acute hypoxemic respiratory failure  Acute fractures of R lateral 8th and 9th ribs  Mildly comminuted fracture of manubrium  -Patient presents with shortness of breath that is developed over the last 24 to 36 hours without significant cough, chest discomfort or evidence of fluid overload  -She has fallen recently and does have an ecchymotic region on her chest wall but she states this is not painful and she is able to take deep breaths  -She and family report that she has just not been feeling well recently with poor intake and generalized weakness.  She has been on multiple different antibiotics for lower extremity wounds recently  -Chest x-ray does not show  obvious pneumonia, CT showed above fractures.  -Cardiac etiology unlikely. BNP is normal, troponin mildly elevated which will be discussed below. TTE with normal EF (did show moderately severe mitral regurg).  -low suspicion for infection. UA negative. Blood cultures NGTD.  -Per patient's family she was evaluated by wound clinic today with significant improvement of her lower extremity wounds and ended her antibiotic treatment with Bactrim on 4/8  -Suspect limited inspiration 2/2 rib and sternum fracture -> hypoxia  -Continue to wean oxygen  -Scheduled tylenol  -Incentive spirometry  -Gen surgery consult for trauma eval    Generalized weakness with more frequent falling  -She reports 3 falls in the last couple days all of them related to weakness when she is trying to stand and unable to.  She has fallen onto her arm and has a significant bruise on her chest wall.  No fractures seen on right humerus x-ray or chest x-ray  -Clinically she does appear dehydrated and lab work would support this.  It sounds like she has not been eating well recently which I suspect is related to multiple different antibiotics for her lower leg wounds  -Infectious workup as above  -PT and social work consults  -Nutrition consult  -She lives independently so there is a high likelihood she may need rehab    NSTEMI-suspect demand  -Troponin mildly elevated at 41 without complaint of chest pain but she is having new shortness of breath without history of coronary disease  -Repeat troponin stable  -continue PTA full dose Aspirin    Acute renal insufficiency  Hyponatremia  -Baseline creatinine of 0.8-0.9 with admission creatinine of 1.19 suspect due to poor oral intake and possibly recent Bactrim use  -Repeat Cr back to baseline  -Hold lisinopril and Lasix  -Sodium low at 133, repeat 134  -Avoid nephrotoxins    Right shoulder pain  -Patient does have increased right shoulder pain after her fall.  She has the most pain when lifting her right arm  in the shoulder joint.  I am able to passively move the arm without any pain.  Suspect underlying rotator cuff injury.  -R shoulder XR  -Tylenol and icing for pain    Chest wall ecchymosis  -Does not seem to bother her, rib fracture management as above    Lower extremity wounds  -Follows with wound clinic and she reports that she had an appointment with them today with improvement of her wounds and just ended her third course of antibiotics on 4/8  -Per her daughter she would like to wrap them daily so we will not do wound care nurse consult.  They are due to be rewrapped on 4/11    Hypertension  -Hold daily Lasix and Lisinopril            Diet: Combination Diet Regular Diet Adult    DVT Prophylaxis: Enoxaparin (Lovenox) SQ  Martinez Catheter: Not present  Lines: None     Cardiac Monitoring: ACTIVE order. Indication: AMI (NSTEMI/ STEMI) (48 hours)  Code Status: No CPR- Do NOT Intubate      Clinically Significant Risk Factors Present on Admission                # Drug Induced Platelet Defect: home medication list includes an antiplatelet medication   # Hypertension: Noted on problem list          # Financial/Environmental Concerns: none         Disposition Plan     Medically Ready for Discharge: Anticipated in 2-4 Days             Susannah Fischer DO  Hospitalist Service  [facility]  Securely message with [employer] (more info)  Text page via [employer] Paging/Directory   ______________________________________________________________________    Interval History   No acute overnight events.  Doing well. Breathing feels better. Noticed some R sided chest pain, but it is tolerable.     Physical Exam   Vital Signs: Temp: 97.4  F (36.3  C) Temp src: Oral BP: (!) 161/81 Pulse: 71   Resp: 14 SpO2: 99 % O2 Device: Nasal cannula Oxygen Delivery: 2.5 LPM  Weight: 115 lbs 8.34 oz    Constitutional: Awake, alert, no distress, and cooperative  Cardiovascular: Regular rate and rhythm, normal S1 and S2, no S3 or S4,  and no murmur noted  Respiratory: No increased work of breathing, good air exchange, clear to auscultation bilaterally, no crackles or wheezing  Gastrointestinal: Abdomen soft, non-tender, non-distended. BS normal. No masses, organomegaly     Medical Decision Making       45 MINUTES SPENT BY ME on the date of service doing chart review, history, exam, documentation & further activities per the note.      Data     I have personally reviewed the following data over the past 24 hrs:    4.5  \   10.7 (L)   / 106 (L)     134 (L) 100 23.4 (H) /  101 (H)   4.7 24 0.99 (H) \     ALT: 23 AST: 35 AP: 64 TBILI: 0.5   ALB: 4.3 TOT PROTEIN: 5.9 (L) LIPASE: N/A     Trop: 40 (H) BNP: 600

## 2024-04-10 NOTE — PLAN OF CARE
"Goal Outcome Evaluation:      Plan of Care Reviewed With: patient    Overall Patient Progress: no changeOverall Patient Progress: no change    Outcome Evaluation: Pt. reporting no pain in bed.  O2 sats stables on 1 LPM NC.      Patient arrived form ED at 1630.  A&O x4.  VSS.  Extensive bruising on upper body from fall. Q2 IS.      Problem: Adult Inpatient Plan of Care  Goal: Plan of Care Review  Description: The Plan of Care Review/Shift note should be completed every shift.  The Outcome Evaluation is a brief statement about your assessment that the patient is improving, declining, or no change.  This information will be displayed automatically on your shift  note.  Outcome: Progressing  Flowsheets (Taken 4/10/2024 1841)  Outcome Evaluation: Pt. reporting no pain in bed.  O2 sats stables on 1 LPM NC.  Plan of Care Reviewed With: patient  Overall Patient Progress: no change  Goal: Patient-Specific Goal (Individualized)  Description: You can add care plan individualizations to a care plan. Examples of Individualization might be:  \"Parent requests to be called daily at 9am for status\", \"I have a hard time hearing out of my right ear\", or \"Do not touch me to wake me up as it startles  me\".  Outcome: Progressing  Goal: Absence of Hospital-Acquired Illness or Injury  Outcome: Progressing  Intervention: Identify and Manage Fall Risk  Recent Flowsheet Documentation  Taken 4/10/2024 1830 by Alex Morales, RN  Safety Promotion/Fall Prevention:   safety round/check completed   room near nurse's station  Goal: Optimal Comfort and Wellbeing  Outcome: Progressing  Goal: Readiness for Transition of Care  Outcome: Progressing  Intervention: Mutually Develop Transition Plan  Recent Flowsheet Documentation  Taken 4/10/2024 1600 by Alex Morales, RN  Equipment Currently Used at Home: walker, rolling     Problem: Skin Injury Risk Increased  Goal: Skin Health and Integrity  Outcome: Progressing  Intervention: Plan: Nurse " Driven Intervention: Moisture Management  Recent Flowsheet Documentation  Taken 4/10/2024 1830 by Alex Morales, RN  Moisture Interventions: Encourage regular toileting     Problem: Gas Exchange Impaired  Goal: Optimal Gas Exchange  Outcome: Progressing

## 2024-04-10 NOTE — PHARMACY-ADMISSION MEDICATION HISTORY
Pharmacist Admission Medication History    Admission medication history is complete. The information provided in this note is only as accurate as the sources available at the time of the update.    Information Source(s): Patient, Family member, and CareEverywhere/SureScripts via in-person    Pertinent Information:   Completed 7-day course Bactrim yesterday.       Changes made to PTA medication list:  Added: Blink tears  Deleted: None  Changed: None    Allergies reviewed with patient and updates made in EHR: yes    Medication History Completed By: Omar Huggins [facility] 4/9/2024 8:55 PM    PTA Med List   Medication Sig Last Dose    acetaminophen (TYLENOL) 500 MG tablet Take 500-1,000 mg by mouth every 6 hours as needed for mild pain 4/9/2024 at AM    amoxicillin (AMOXIL) 500 MG capsule Take 4 capsules by mouth 1 hour prior to dental appt Unknown    aspirin (ASA) 325 MG tablet Take 325 mg by mouth daily 4/9/2024 at AM    CALCIUM 600 + D 600-200 MG-UNIT OR TABS Take 1 tablet by mouth daily  4/9/2024 at AM    furosemide (LASIX) 40 MG tablet Take 1 tablet (40 mg) by mouth daily 4/9/2024 at AM    ketoconazole (NIZORAL) 2 % external shampoo  Unknown    latanoprost (XALATAN) 0.005 % ophthalmic solution Place 1 drop into both eyes at bedtime 4/8/2024 at HS    lisinopril (ZESTRIL) 40 MG tablet Take 1 tablet (40 mg) by mouth daily 4/9/2024 at AM    metoprolol tartrate (LOPRESSOR) 100 MG tablet Take 1 tablet (100 mg) by mouth 2 times daily 4/9/2024 at AM    multivitamin (OCUVITE) TABS tablet Take 1 tablet by mouth daily 4/9/2024 at AM    polyethylene glycol 400 (BLINK TEARS) 0.25 % SOLN ophthalmic solution Place 1 drop into both eyes 2 times daily as needed for dry eyes Past Week    potassium chloride ER (KLOR-CON M) 20 MEQ CR tablet Take 1 tablet (20 mEq) by mouth daily 4/9/2024 at AM    timolol maleate (TIMOPTIC) 0.5 % ophthalmic solution Place 1 drop into both eyes every morning 4/9/2024 at AM

## 2024-04-10 NOTE — CONSULTS
[facility]   Trauma Surgical Consultation            Assessment and Plan:   Assessment:   Dayana Samano is a 89 year old female who presents after fall.    Acute traumatic injuries by imaging:   Right rib fractures 8 and 9.  Manubrium fracture, mildly comminuted    On exam has new findings of limited right shoulder ROM and tenderness around the shoulder joint      Plan:   Right shoulder pain and limited ROM: right shoulder XR. If any acute findings, consult ortho    Rib fractures and manubrium fracture:  Pain control - she is comfortable currently. On tylenol scheduled, lidoderm patch  Respiratory therapy - Monitor O2 sats, EZPAP TID, Acapella, HC vital capacity treatment TID, nebs prn, IS instruction and teaching - 10x/hr  Daily CXR  Regular diet  Monitor electrolytes, replace prn.    Case discussed with consulting provider:  Dr Fischer              Chief Complaint:   fall     History is obtained from the patient.         History of Present Illness:     Dayana Samano is a 89 year old  female who presented to the ED after multiple falls in the last few days with weakness, right shoulder pain and hypoxia.  Denies shortness of breath, abdominal pain, nausea, emesis, dizziness, visual changes, headache, neck pain  Ambulates with a walker at baseline            Past Medical History:    has a past medical history of Aftercare following surgery of the musculoskeletal system (5/30/2017), Closed fracture of multiple ribs of left side with routine healing (11/24/2016), Closed fracture of ramus of left pubis (H) (9/21/2017), Closed fracture of right femur (H) (6/26/2017), Essential hypertension, benign, Hearing loss, History of CVA (cerebrovascular accident), History of endocarditis, Osteoporosis, unspecified, and Personal history of colonic polyps.          Past Surgical History:     Past Surgical History:   Procedure Laterality Date    FEMUR SURGERY Right 05/16/2017    nail; right intertrochanteric  fracture    LAPAROSCOPIC APPENDECTOMY N/A 2020    Procedure: LAPAROSCOPIC APPENDECTOMY;  Surgeon: Spenser Kent MD;  Location: [facility] OR    ORIF ELBOW FRACTURE Left 01/01/2015    x2            Social History:     Social History     Tobacco Use    Smoking status: Former     Current packs/day: 0.00     Types: Cigarettes     Quit date: 1990     Years since quittin.2     Passive exposure: Past    Smokeless tobacco: Never   Substance Use Topics    Alcohol use: Yes     Comment: glass of wine with dinner-sometimes             Family History:     Family History   Problem Relation Age of Onset    Cerebrovascular Disease Father             Allergies:     Allergies   Allergen Reactions    Meperidine Nausea and Vomiting    Morphine Nausea and Vomiting    Hydromorphone Nausea and Vomiting    No Known Allergies              Medications:     Current Facility-Administered Medications   Medication Dose Route Frequency Provider Last Rate Last Admin    acetaminophen (TYLENOL) tablet 650 mg  650 mg Oral Q4H PRN Sanjuana Ash PA-C        Or    acetaminophen (TYLENOL) Suppository 650 mg  650 mg Rectal Q4H PRN Sanjuana Ash PA-C        acetaminophen (TYLENOL) tablet 975 mg  975 mg Oral Q8H [facility] Sus[facility] Fischer DO   975 mg at 04/10/24 1138    aspirin (ASA) tablet 325 mg  325 mg Oral Daily Sanjuana Ash PA-C   325 mg at 04/10/24 0755    calcium carbonate (TUMS) chewable tablet 1,000 mg  1,000 mg Oral 4x Daily PRN Sanjuana Ash PA-C        carboxymethylcellulose PF (REFRESH PLUS) 0.5 % ophthalmic solution 1 drop  1 drop Both Eyes BID PRN Sanjuana Ash PA-C        enoxaparin ANTICOAGULANT (LOVENOX) injection 30 mg  30 mg Subcutaneous Q24H Sanjuana Ash PA-C   30 mg at 04/10/24 0755    Lidocaine (LIDOCARE) 4 % Patch 1 patch  1 patch Transdermal Q24H Susannah Fischer DO   1 patch at 04/10/24 1139    lidocaine (LMX4) cream   Topical Q1H PRN Sanjuana Ash  MARY Preciado        lidocaine 1 % 0.1-1 mL  0.1-1 mL Other Q1H PRN Sanjuana Ash PA-C        melatonin tablet 1 mg  1 mg Oral At Bedtime PRN Sanjuana Ash PA-C        metoprolol tartrate (LOPRESSOR) tablet 100 mg  100 mg Oral BID Sanjuana Ash PA-C   100 mg at 04/10/24 0755    ondansetron (ZOFRAN ODT) ODT tab 4 mg  4 mg Oral Q6H PRN Sanjuana Ash PA-C        Or    ondansetron (ZOFRAN) injection 4 mg  4 mg Intravenous Q6H PRN Sanjuana Ash PA-C        potassium & sodium phosphates (NEUTRA-PHOS) Packet 1 packet  1 packet Oral or Feeding Tube Q4H Marnie Lua MD   1 packet at 04/10/24 1139    senna-docusate (SENOKOT-S/PERICOLACE) 8.6-50 MG per tablet 1 tablet  1 tablet Oral BID PRN Sanjuana Ash PA-C        Or    senna-docusate (SENOKOT-S/PERICOLACE) 8.6-50 MG per tablet 2 tablet  2 tablet Oral BID PRN Sanjuana Ash PA-C        sodium chloride (PF) 0.9% PF flush 3 mL  3 mL Intracatheter Q8H Sanjuana Ash PA-C        sodium chloride (PF) 0.9% PF flush 3 mL  3 mL Intracatheter q1 min prn Sanjuana Ash PA-C        timolol maleate (TIMOPTIC) 0.5 % ophthalmic solution 1 drop  1 drop Both Eyes QAM Sanjuana Ash PA-C   1 drop at 04/10/24 0755     Current Outpatient Medications   Medication Sig Dispense Refill    acetaminophen (TYLENOL) 500 MG tablet Take 500-1,000 mg by mouth every 6 hours as needed for mild pain      amoxicillin (AMOXIL) 500 MG capsule Take 4 capsules by mouth 1 hour prior to dental appt      aspirin (ASA) 325 MG tablet Take 325 mg by mouth daily      CALCIUM 600 + D 600-200 MG-UNIT OR TABS Take 1 tablet by mouth daily  3 MONTHS 1 YEAR    furosemide (LASIX) 40 MG tablet Take 1 tablet (40 mg) by mouth daily 90 tablet 3    ketoconazole (NIZORAL) 2 % external shampoo       latanoprost (XALATAN) 0.005 % ophthalmic solution Place 1 drop into both eyes at bedtime      lisinopril (ZESTRIL) 40 MG tablet  Take 1 tablet (40 mg) by mouth daily 90 tablet 3    metoprolol tartrate (LOPRESSOR) 100 MG tablet Take 1 tablet (100 mg) by mouth 2 times daily 180 tablet 3    multivitamin (OCUVITE) TABS tablet Take 1 tablet by mouth daily      polyethylene glycol 400 (BLINK TEARS) 0.25 % SOLN ophthalmic solution Place 1 drop into both eyes 2 times daily as needed for dry eyes      potassium chloride ER (KLOR-CON M) 20 MEQ CR tablet Take 1 tablet (20 mEq) by mouth daily 90 tablet 3    timolol maleate (TIMOPTIC) 0.5 % ophthalmic solution Place 1 drop into both eyes every morning       Current Facility-Administered Medications   Medication Dose Route Frequency Provider Last Rate Last Admin    acetaminophen (TYLENOL) tablet 975 mg  975 mg Oral Q8H [facility] Susannah Fischer DO   975 mg at 04/10/24 1138    aspirin (ASA) tablet 325 mg  325 mg Oral Daily Sanjuana Ash PA-C   325 mg at 04/10/24 0755    enoxaparin ANTICOAGULANT (LOVENOX) injection 30 mg  30 mg Subcutaneous Q24H Sanjuana Ash PA-C   30 mg at 04/10/24 0755    Lidocaine (LIDOCARE) 4 % Patch 1 patch  1 patch Transdermal Q24H Susannah Fischer DO   1 patch at 04/10/24 1139    metoprolol tartrate (LOPRESSOR) tablet 100 mg  100 mg Oral BID Sanjuana Ash PA-C   100 mg at 04/10/24 0755    potassium & sodium phosphates (NEUTRA-PHOS) Packet 1 packet  1 packet Oral or Feeding Tube Q4H Marnie Lua MD   1 packet at 04/10/24 1139    sodium chloride (PF) 0.9% PF flush 3 mL  3 mL Intracatheter Q8H Sanjuana Ash PA-C        timolol maleate (TIMOPTIC) 0.5 % ophthalmic solution 1 drop  1 drop Both Eyes QAM Sanjuana Ash PA-C   1 drop at 04/10/24 0755            Review of Systems:   The 10 point review of systems is negative other than noted in the HPI.           Physical Exam:   BP (!) 161/81 (BP Location: Right arm)   Pulse 71   Temp 97.4  F (36.3  C) (Oral)   Resp 14   Ht 1.524 m (5')   Wt 52.4 kg (115 lb 8.3 oz)   SpO2 99%    BMI 22.56 kg/m    General appearance: elderly frail appearing woman, no apparent distress  Head: Head is normocephalic and atraumatic.  Eyes: Pupils are equal and round and reactive to light. Eyes atraumatic. EOM full, no proptosis, no conjunctival injection  ENT: External ears normal. No external auditory canal discharge or bleeding. Nose without injury. Lips, tongue and oral mucosa without lacerations. No malocclusion. Trachea midline, no neck swelling or masses noted.   Neck: No midline tenderness  Chest:  Clear to auscultation bilaterally.    Heart with regular rate and rhythm, systolic murmur.    Significant ecchymosis of upper midline chest wall, extending to right lateral breast.  Abdomen:  Nondistended, soft, nontender to palpation  Back: mild TTP over midline thoracic spine  Extremities: Left upper extremity normal without injury  Right upper extremity with pain over posterior and lateral shoulder joint, minimal ability for active ROM abduction or flexion. Humeral head appears malaligned compared to left side. Ecchymosis over mid forearm at prior IV or blood draw site  Bilateral lower extremities normal without injury, Full ROM of all major joints. Bilateral calves in ace wraps  Neurologic: nonfocal, grossly intact times four extremities with normal strength, alert and oriented times three.  Cranial nerves II through XII intact grossly.  Psychiatric: mood and affect are appropriate.  Skin: Ecchymosis  as noted above.           Data:   WBC -   WBC   Date Value Ref Range Status   06/10/2021 4.9 4.0 - 11.0 10e9/L Final     WBC Count   Date Value Ref Range Status   04/10/2024 4.5 4.0 - 11.0 10e3/uL Final   ], HgB -   Hemoglobin   Date Value Ref Range Status   04/10/2024 10.7 (L) 11.7 - 15.7 g/dL Final   06/10/2021 12.7 11.7 - 15.7 g/dL Final   ]   Liver Function Studies -   Recent Labs   Lab Test 04/09/24 1919   PROTTOTAL 5.9*   ALBUMIN 4.3   BILITOTAL 0.5   ALKPHOS 64   AST 35   ALT 23          IMAGING:  Results for orders placed or performed during the hospital encounter of 04/09/24   XR Chest 2 Views    Narrative    EXAM: XR CHEST 2 VIEWS  LOCATION: [facility]  DATE: 4/9/2024    INDICATION: dyspnea  COMPARISON: None.      Impression    IMPRESSION: Cardiomegaly. Pulmonary vascularity normal. Prominent costochondral calcifications bilaterally. Asymmetric density right upper lobe and mediastinum, indeterminate. Mass not excluded. There is also a circular nodule versus artifact measuring   1.7 cm mid left lung. CT recommended for further evaluation. Old left rib fractures. Advanced degenerative changes right shoulder.   CT Head w/o Contrast    Narrative    EXAM: CT HEAD W/O CONTRAST, CT CERVICAL SPINE W/O CONTRAST  LOCATION: [facility]  DATE: 4/9/2024    INDICATION: Trauma, fall, confusion  COMPARISON: None.  TECHNIQUE:   1) Routine CT Head without IV contrast. Multiplanar reformats. Dose reduction techniques were used.  2) Routine CT Cervical Spine without IV contrast. Multiplanar reformats. Dose reduction techniques were used.    FINDINGS:   HEAD CT:   INTRACRANIAL CONTENTS: Incidental small calcified left anterior parafalcine meningioma. No intracranial hemorrhage, extraaxial collection, or mass effect.   Age-indeterminate, however likely chronic left corona radiata and corpus striatal infarcts. Small   chronic left precentral gyrus infarct at the left hand motor knob. Severe presumed chronic small vessel ischemic changes. Moderate generalized volume loss. No hydrocephalus.     VISUALIZED ORBITS/SINUSES/MASTOIDS: No intraorbital abnormality. No significant paranasal sinus mucosal disease. No middle ear or mastoid effusion.    BONES/SOFT TISSUES: No acute abnormality.    CERVICAL SPINE CT:   VERTEBRA: Exaggeration of the usual cervical lordosis. Minimal stepwise anterolisthesis from C2-C6. Chronic vertebral body height loss at T1-T2 with ankylosis.  Ankylosis of the C2-C4 lateral masses. No acute compression fracture or traumatic subluxation.    CANAL/FORAMINA: Multilevel spondylosis without high grade canal stenosis.    PARASPINAL: No prevertebral edema. Chronic right second posterior rib fracture deformity.      Impression    IMPRESSION:  HEAD CT:  1.  Age-indeterminate, however likely chronic left corona radiata and corpus striatal infarcts.  2.  No acute intracranial hemorrhage.    CERVICAL SPINE CT:  1.  No acute cervical spine fracture.   CT Cervical Spine w/o Contrast    Narrative    EXAM: CT HEAD W/O CONTRAST, CT CERVICAL SPINE W/O CONTRAST  LOCATION: [facility]  DATE: 4/9/2024    INDICATION: Trauma, fall, confusion  COMPARISON: None.  TECHNIQUE:   1) Routine CT Head without IV contrast. Multiplanar reformats. Dose reduction techniques were used.  2) Routine CT Cervical Spine without IV contrast. Multiplanar reformats. Dose reduction techniques were used.    FINDINGS:   HEAD CT:   INTRACRANIAL CONTENTS: Incidental small calcified left anterior parafalcine meningioma. No intracranial hemorrhage, extraaxial collection, or mass effect.   Age-indeterminate, however likely chronic left corona radiata and corpus striatal infarcts. Small   chronic left precentral gyrus infarct at the left hand motor knob. Severe presumed chronic small vessel ischemic changes. Moderate generalized volume loss. No hydrocephalus.     VISUALIZED ORBITS/SINUSES/MASTOIDS: No intraorbital abnormality. No significant paranasal sinus mucosal disease. No middle ear or mastoid effusion.    BONES/SOFT TISSUES: No acute abnormality.    CERVICAL SPINE CT:   VERTEBRA: Exaggeration of the usual cervical lordosis. Minimal stepwise anterolisthesis from C2-C6. Chronic vertebral body height loss at T1-T2 with ankylosis. Ankylosis of the C2-C4 lateral masses. No acute compression fracture or traumatic subluxation.    CANAL/FORAMINA: Multilevel spondylosis without high grade  canal stenosis.    PARASPINAL: No prevertebral edema. Chronic right second posterior rib fracture deformity.      Impression    IMPRESSION:  HEAD CT:  1.  Age-indeterminate, however likely chronic left corona radiata and corpus striatal infarcts.  2.  No acute intracranial hemorrhage.    CERVICAL SPINE CT:  1.  No acute cervical spine fracture.   Humerus XR, G/E 2 views, right    Narrative    EXAM: XR HUMERUS RIGHT G/E 2 VIEWS  LOCATION: [facility]  DATE: 4/9/2024    INDICATION: Right arm pain after fall.  COMPARISON: None.      Impression    IMPRESSION: No acute displaced right humerus fracture is identified. Chronic advanced arthropathy at the glenohumeral joint with chronic remodeling of the coracoid process. Axillary view of the right shoulder suggested if there is concern for   glenohumeral joint malalignment. Arthrosis at the AC joint with lateral downsloping of the acromion and diffuse bone demineralization. No appreciable elbow joint effusion. Visualized right lung is grossly clear.   Chest CT w IV contrast only, TRAUMA / DISSECTION    Narrative    EXAM: CT CHEST W CONTRAST  LOCATION: [facility]  DATE: 4/9/2024    INDICATION: abnormal mediastinum on CXR, falls, dsypnea  COMPARISON: None.  TECHNIQUE: CT chest with IV contrast. Multiplanar reformats were obtained. Dose reduction techniques were used.    CONTRAST: 58mL Isovue 370    FINDINGS:   LUNGS AND PLEURA: A heterogeneous attenuation, rounded nodule in the left upper lobe adjacent to the lateral major fissure and contacting the lateral costal pleura measures 1.8 cm and is consistent with a benign hamartoma. There is a benign triangular   calcification in the basilar left lower lobe. No actionable lung nodules. Pleural parenchymal scarring with calcification in the apices. No airspace opacities. Bands of atelectasis are present in the lower lobes along the mediastinal and posterior costal   pleura. Central  airways are patent and normal caliber. No pleural effusion.    MEDIASTINUM: Cardiac chambers are normal in size. Mitral annular calcifications. No pericardial effusion. Nonaneurysmal thoracic aorta. The thyroid gland is normal. No enlarged mediastinal or hilar lymph nodes. The esophagus is decompressed. No   mediastinal mass.    CORONARY ARTERY CALCIFICATION: Severe.    UPPER ABDOMEN: Benign calcified granuloma in the spleen. There are several benign cysts in the left lobe of the liver one of which has peripheral calcification.     MUSCULOSKELETAL: There is a mildly comminuted fractures of the manubrium. Additional acute fractures of the right lateral eighth and ninth ribs. There are multiple healed fracture deformities of left lateral ribs. Fat atrophy of both rotator cuff muscles   right worse than left. Right worse than left glenohumeral osteoarthrosis.      Impression    IMPRESSION:     1.  Acute fractures of the right lateral eighth and ninth ribs and mildly comminuted fracture of the manubrium.  2.  Severe atheromatous coronary calcifications.  3.  Solid nodule in the lingula with attenuation consistent with a benign hamartoma. No acute lung, airway, or pleural inflammatory process.   Echocardiogram Complete     Value    LVEF  60-65%    [facility]    [id number]  [id number]  [id number]  [id number]^KETAN^CONY^JENISE     [facility]  Echocardiography Laboratory  [address] [place] [address]     Name: CARRI KOENIG  MRN: [id number]  : 1924  Study Date: 04/10/2024 10:52 AM  Age: 89 yrs  Gender: Female  Patient Location[place] [facility]  [place] [facility] [place]  Ordering Physician: CONY ACEVES  Referring Physician: Delmer April  Performed By: Bradley Velázquez RDCS     BSA: 1.5 m2  Height: 60 in  Weight: 115 lb  HR: 65  BP: 132/64 mmHg  ______________________________________________________________________________  Procedure  Complete Echo  Adult.  ______________________________________________________________________________  Interpretation Summary     Left ventricular systolic function is normal.  The visual ejection fraction is 60-65%.  No regional wall motion abnormalities noted.  There is moderately severe (3+) mitral regurgitation.  Eccentric MR jet along the interatrial septum.  Flow reversal noted in pulmonary veins consistent with significant mitral  regurgitation.  The study was technically difficult. There is no comparison study available.  ______________________________________________________________________________  Left Ventricle  The left ventricle is normal in size. There is normal left ventricular wall  thickness. Left ventricular systolic function is normal. The visual ejection  fraction is 60-65%. Left ventricular diastolic function is indeterminate. No  regional wall motion abnormalities noted.     Right Ventricle  The right ventricle is normal size. The right ventricular systolic function is  normal.     Atria  Normal left atrial size. Right atrial size is normal.     Mitral Valve  There is mild mitral annular calcification. There is moderately severe (3+)  mitral regurgitation. Eccentric MR jet along the interatrial septum. Flow  reversal noted in pulmonary veins consistent with significant mitral  regurgitation.     Tricuspid Valve  There is mild (1+) tricuspid regurgitation. The right ventricular systolic  pressure is approximated at 29.5 mmHg plus the right atrial pressure.     Aortic Valve  There is mild trileaflet aortic sclerosis. There is mild (1+) aortic  regurgitation. No aortic stenosis is present.     Pulmonic Valve  The pulmonic valve is not well visualized.     Vessels  The aortic root is normal size.     Pericardium  There is no pericardial effusion.     Rhythm  Sinus rhythm was noted.  ______________________________________________________________________________  MMode/2D Measurements & Calculations  IVSd: 0.86  cm     LVIDd: 4.4 cm  LVIDs: 3.1 cm  LVPWd: 0.86 cm  IVC diam: 0.81 cm  FS: 27.8 %  LV mass(C)d: 117.7 grams  LV mass(C)dI: 79.8 grams/m2  Ao root diam: 3.2 cm  asc Aorta Diam: 3.5 cm  LVOT diam: 1.9 cm  LVOT area: 2.8 cm2  Ao root diam index Ht(cm/m): 2.1  Ao root diam index BSA (cm/m2): 2.2  Asc Ao diam index BSA (cm/m2): 2.4  Asc Ao diam index Ht(cm/m): 2.3  LA Volume (BP): 30.7 ml     LA Volume Index (BP): 20.7 ml/m2  RWT: 0.39  TAPSE: 1.9 cm     Time Measurements  Aortic HR: 64.0 BPM     Doppler Measurements & Calculations  MV E max jordin: 104.0 cm/sec  MV A max jordin: 125.0 cm/sec  MV E/A: 0.83  MV max P.5 mmHg  MV mean PG: 3.4 mmHg  MV V2 VTI: 46.6 cm  MVA(VTI): 1.9 cm2  MV dec time: 0.27 sec  AI P1/2t: 630.2 msec  LV V1 max P.5 mmHg  LV V1 max: 137.0 cm/sec  LV V1 VTI: 30.7 cm  MR PISA: 3.7 cm2  MR ERO: 0.32 cm2  MR volume: 17.4 ml  CO(LVOT): 5.6 l/min  CI(LVOT): 3.8 l/min/m2  SV(LVOT): 86.8 ml  SI(LVOT): 58.8 ml/m2     PA acc time: 0.10 sec  TR max jordin: 271.6 cm/sec  TR max P.5 mmHg  E/E' av.8  Lateral E/e': 13.7  Medial E/e': 11.9  RV S Jordin: 17.6 cm/sec     ______________________________________________________________________________  Report approved by: Fransisco Crenshaw 04/10/2024 11:51 AM             This note was created using voice recognition software. Undetected word substitutions or other errors may have occurred.     Lissett Lynne MD

## 2024-04-10 NOTE — ED PROVIDER NOTES
History     Chief Complaint:  Shortness of Breath       HPI   Dayana Samano is a 89 year old female with history of congestive heart failure, endocarditis, CVA, mitral regurgitation, stage III CKD, and wounds of bilateral lower extremities who presents with complaints of generalized weakness, shortness of breath, and right shoulder pain after a fall.  History was also obtained with the help of the patient's daughter.  Daughter reports patient fell last Friday, unwitnessed, no loss of consciousness.  The patient states when she fell she landed on a wall on her right shoulder and then slid down the wall.  The daughter states since then the patient appears to have dyspnea on exertion and some mild confusion.  The patient states she has felt very weak since the fall with decreased appetite.  She denies headache, neck or back pain, chest pain, abdominal pain, nausea vomiting, fever and chills.  She also denies increased swelling in her bilateral lower extremities.  Patient was at her wound care clinic today for her leg wounds, she was found to be short of breath, pulse oximetry was 90% on room air.  Patient reports her shortness of breath has improved after use of oxygen today.        Independent Historian:   Daughter - They report as noted above    Review of External Notes:   4/1/2024 family practice office visit note reviewed for cellulitis of right lower extremity, patient was given 1 g of Rocephin IM and Bactrim DS for 7 days      Medications:    acetaminophen (TYLENOL) 500 MG tablet  amoxicillin (AMOXIL) 500 MG capsule  aspirin (ASA) 325 MG tablet  CALCIUM 600 + D 600-200 MG-UNIT OR TABS  furosemide (LASIX) 40 MG tablet  ketoconazole (NIZORAL) 2 % external shampoo  latanoprost (XALATAN) 0.005 % ophthalmic solution  lisinopril (ZESTRIL) 40 MG tablet  metoprolol tartrate (LOPRESSOR) 100 MG tablet  multivitamin (OCUVITE) TABS tablet  polyethylene glycol 400 (BLINK TEARS) 0.25 % SOLN ophthalmic solution  potassium  chloride ER (KLOR-CON M) 20 MEQ CR tablet  timolol maleate (TIMOPTIC) 0.5 % ophthalmic solution        Past Medical History:    Past Medical History:   Diagnosis Date    Aftercare following surgery of the musculoskeletal system 5/30/2017    Closed fracture of multiple ribs of left side with routine healing 11/24/2016    Closed fracture of ramus of left pubis (H) 9/21/2017    Closed fracture of right femur (H) 6/26/2017    Essential hypertension, benign     Hearing loss     History of CVA (cerebrovascular accident)     History of endocarditis     Osteoporosis, unspecified     Personal history of colonic polyps        Past Surgical History:    Past Surgical History:   Procedure Laterality Date    FEMUR SURGERY Right 05/16/2017    nail; right intertrochanteric fracture    LAPAROSCOPIC APPENDECTOMY N/A 5/8/2020    Procedure: LAPAROSCOPIC APPENDECTOMY;  Surgeon: Spenser Kent MD;  Location: [facility] OR    ORIF ELBOW FRACTURE Left 01/01/2015    x2        Physical Exam   Patient Vitals for the past 24 hrs:   BP Temp Temp src Pulse Resp SpO2 Height Weight   04/09/24 1825 132/64 97.8  F (36.6  C) Oral 77 22 92 % 1.524 m (5') 52.4 kg (115 lb 8.3 oz)        Physical Exam    Physical Exam:  GENERAL: Warm, dry, alert, mildly increased increased work of breathing.  HEENT: PERRLA, no nystagmus, EOMs intact, clear conjunctiva, oropharynx clear  NECK: No JVD, supple without lymphadenopathy.  No stiffness or restricted range of motion  HEART: Regular rate and rhythm, no murmur or rubs  LUNGS: CTAB, moving air well.  No crackles or wheezes are heard.  CHEST: Ecchymosis across upper chest, tender to palpation, no crepitus, no instability  ABD: Soft, nontender, nondistended, no guarding, with good bowel sounds heard.  BACK: No CVAT, no obvious deformities  EXTREMITIES: Ecchymosis posterior right upper extremity over the triceps muscle, bilateral lower extremities wrapped in bandages however there is no erythema nor edema noted bilateral  feet, 3+ bilateral dorsalis pedis pulses.  SKIN: Warm and dry without rash or lesions.  NEUROLOGICAL: No focal deficits.  CN II-XII intact.  PSYCH: Appropriate mood and affect.     Emergency Department Course   ECG  ECG results from 04/09/24   EKG 12 lead     Value    Systolic Blood Pressure     Diastolic Blood Pressure     Ventricular Rate 70    Atrial Rate 70    MI Interval 148    QRS Duration 82        QTc 416    P Axis 79    R AXIS 59    T Axis 63    Interpretation ECG      Sinus rhythm  Normal ECG  When compared with ECG of 08-MAY-2020 13:41,  No significant change was found         Imaging:  XR Chest 2 Views   Final Result   IMPRESSION: Cardiomegaly. Pulmonary vascularity normal. Prominent costochondral calcifications bilaterally. Asymmetric density right upper lobe and mediastinum, indeterminate. Mass not excluded. There is also a circular nodule versus artifact measuring    1.7 cm mid left lung. CT recommended for further evaluation. Old left rib fractures. Advanced degenerative changes right shoulder.      Humerus XR, G/E 2 views, right   Final Result   IMPRESSION: No acute displaced right humerus fracture is identified. Chronic advanced arthropathy at the glenohumeral joint with chronic remodeling of the coracoid process. Axillary view of the right shoulder suggested if there is concern for    glenohumeral joint malalignment. Arthrosis at the AC joint with lateral downsloping of the acromion and diffuse bone demineralization. No appreciable elbow joint effusion. Visualized right lung is grossly clear.      CT Cervical Spine w/o Contrast   Final Result   IMPRESSION:   HEAD CT:   1.  Age-indeterminate, however likely chronic left corona radiata and corpus striatal infarcts.   2.  No acute intracranial hemorrhage.      CERVICAL SPINE CT:   1.  No acute cervical spine fracture.      CT Head w/o Contrast   Final Result   IMPRESSION:   HEAD CT:   1.  Age-indeterminate, however likely chronic left corona  radiata and corpus striatal infarcts.   2.  No acute intracranial hemorrhage.      CERVICAL SPINE CT:   1.  No acute cervical spine fracture.      Chest CT w IV contrast only, TRAUMA / DISSECTION    (Results Pending)          Laboratory:  Labs Ordered and Resulted from Time of ED Arrival to Time of ED Departure   COMPREHENSIVE METABOLIC PANEL - Abnormal       Result Value    Sodium 133 (*)     Potassium 4.9      Carbon Dioxide (CO2) 27      Anion Gap 9      Urea Nitrogen 32.5 (*)     Creatinine 1.19 (*)     GFR Estimate 41 (*)     Calcium 10.1 (*)     Chloride 97 (*)     Glucose 114 (*)     Alkaline Phosphatase 64      AST 35      ALT 23      Protein Total 5.9 (*)     Albumin 4.3      Bilirubin Total 0.5     BLOOD GAS VENOUS - Abnormal    pH Venous 7.39      pCO2 Venous 47      pO2 Venous 43      Bicarbonate Venous 29 (*)     Base Excess/Deficit Venous 3.1 (*)     FIO2 0      Oxyhemoglobin Venous 76 (*)     O2 Sat, Venous 77.2 (*)    TROPONIN T, HIGH SENSITIVITY - Abnormal    Troponin T, High Sensitivity 41 (*)    CBC WITH PLATELETS AND DIFFERENTIAL - Abnormal    WBC Count 5.9      RBC Count 3.24 (*)     Hemoglobin 11.1 (*)     Hematocrit 32.7 (*)      (*)     MCH 34.3 (*)     MCHC 33.9      RDW 12.4      Platelet Count 119 (*)     % Neutrophils 74      % Lymphocytes 14      % Monocytes 9      % Eosinophils 3      % Basophils 0      % Immature Granulocytes 0      NRBCs per 100 WBC 0      Absolute Neutrophils 4.3      Absolute Lymphocytes 0.8      Absolute Monocytes 0.5      Absolute Eosinophils 0.2      Absolute Basophils 0.0      Absolute Immature Granulocytes 0.0      Absolute NRBCs 0.0     NT PROBNP INPATIENT - Normal    N terminal Pro BNP Inpatient 600     INFLUENZA A/B, RSV, & SARS-COV2 PCR - Normal    Influenza A PCR Negative      Influenza B PCR Negative      RSV PCR Negative      SARS CoV2 PCR Negative     TROPONIN T, HIGH SENSITIVITY        Procedures   None    Emergency Department Course &  Assessments:    Interventions:  Medications   sodium chloride 0.9% BOLUS 500 mL (500 mLs Intravenous $New Bag 4/9/24 2040)            Independent Interpretation (X-rays, CTs, rhythm strip):  Head CT no acute ICH  CT cervical spine no fracture nor subluxation  X-ray right humerus no fracture  Chest x-ray no fracture, enlarged cardiac silhouette, pulmonary nodule right upper lung      Consultations/Discussion of Management or Tests:  Staffed with Dr. Adams   ED Course as of 04/09/24 2101 Tue Apr 09, 2024 1855 I evaluated and examined the patient   2022  Asymmetric density right upper lobe and mediastinum, circular nodule vs artifact mid left lung           Social Determinants of Health affecting care:   None    Disposition:  The patient was admitted to the hospital under the care of Dr. Corey.     Impression & Plan    CMS Diagnoses: None      MIPS (If applicable):  N/A    Medical Decision Making:  Patient is a 89 year old female with history of CHF, stage III CKD, and wounds of bilateral lower extremities who presents with complaints of generalized weakness, shortness of breath, and right shoulder pain after a fall.  Differential includes but is not limited to acute heart failure, ACS, respiratory infection, pneumothorax, rib fracture, and other musculoskeletal injuries among many others.  Vital signs were significant for SpO2 90% on room air otherwise she was normotensive, nontachycardic, and afebrile.  Physical exam was significant for ecchymosis across her upper chest and right upper arm with tenderness to palpation of the right shoulder.  Her bilateral lower extremities were wrapped in bandages from wound care however there was no edema and she had 3+ bilateral dorsalis pedis pulses.  VBG reveals no CO2 retention and normal pH.  Viral swab was negative for RSV, influenza, and COVID-19.  Her BNP was 600, her chart review demonstrates previous BNP in 2017 was 162 however I do not believe that today's  presentation is an acute congestive heart failure.  There was no leukocytosis, there was anemia noted however this is consistent with previous findings.  Patient does have mild hyponatremia.  Her BUN and creatinine are mildly elevated above baseline and GFR mildly decreased below baseline representing a mild NERY.  Patient also found to have elevated troponin however there is no previous troponin which to compare, this is most likely a demand ischemia rather than NSTEMI.  A repeat troponin was ordered.  Radiological findings are described above, in short there are no acute abnormalities on CT of cervical spine and head, no acute rib fractures or right humerus fracture or right shoulder fracture or dislocation.  Chest x-ray did demonstrate an asymmetric density in the right upper lobe and mediastinum in a circular nodule on the mid left lung, radiologist recommends follow-up with a chest CT.  Given this new oxygen dependence with some confusion, generalized weakness, and difficulties with activities of daily living I recommended patient should be admitted for evaluation and good nursing cares.  She and her daughter agree to this plan.  Spoke with the hospitalist Tammi Ash, patient will be admitted to the new oxygen dependence.  Patient was admitted in stable condition.      Diagnosis:    ICD-10-CM    1. Multiple falls  R29.6       2. Weakness  R53.1       3. Shortness of breath  R06.02       4. Elevated troponin  R79.89       5. Acute renal failure superimposed on chronic kidney disease, unspecified acute renal failure type, unspecified CKD stage  (H24)  N17.9     N18.9            Discharge Medications:  New Prescriptions    No medications on file          Shaji Carbone PA-C  4/9/2024   Trevor Adams MD Speakman, John, PA-C  04/09/24 [id number]

## 2024-04-10 NOTE — ED NOTES
[facility]  ED Nurse Handoff Report    ED Chief complaint: Shortness of Breath  . ED Diagnosis:   Final diagnoses:   Multiple falls   Weakness   Shortness of breath   Elevated troponin   Acute renal failure superimposed on chronic kidney disease, unspecified acute renal failure type, unspecified CKD stage  (H24)       Allergies:   Allergies   Allergen Reactions    Meperidine Nausea and Vomiting    Morphine Nausea and Vomiting    Hydromorphone Nausea and Vomiting    No Known Allergies        Code Status: DNR / DNI    Activity level - Baseline/Home:  standby.  Activity Level - Current:   assist of 1.   Lift room needed: No.   Bariatric: No   Needed: No   Isolation: No.   Infection: Not Applicable.     Respiratory status: Nasal cannula    Vital Signs (within 30 minutes):   Vitals:    04/09/24 1825   BP: 132/64   Pulse: 77   Resp: 22   Temp: 97.8  F (36.6  C)   TempSrc: Oral   SpO2: 92%   Weight: 52.4 kg (115 lb 8.3 oz)   Height: 1.524 m (5')       Cardiac Rhythm:  ,      Pain level:    Patient confused: Yes.   Patient Falls Risk: nonskid shoes/slippers when out of bed, patient and family education, activity supervised, and room door open.   Elimination Status: Has voided     Patient Report - Initial Complaint: SOB.   Focused Assessment: Pt is a 89 year old female with history of congestive heart failure, endocarditis, CVA, mitral regurgitation, stage III CKD, and wounds of bilateral lower extremities who presents with complaints of generalized weakness, shortness of breath, and right shoulder pain after a fall.  History was also obtained with the help of the patient's daughter.  Daughter reports patient fell last Friday, unwitnessed, no loss of consciousness.  The patient states when she fell she landed on a wall on her right shoulder and then slid down the wall.  The daughter states since then the patient appears to have dyspnea on exertion and some mild confusion.  The patient states  she has felt very weak since the fall with decreased appetite.  She denies headache, neck or back pain, chest pain, abdominal pain, nausea vomiting, fever and chills.  She also denies increased swelling in her bilateral lower extremities.  Patient was at her wound care clinic today for her leg wounds, she was found to be short of breath, pulse oximetry was 90% on room air.  Patient reports her shortness of breath has improved after use of oxygen today.       Abnormal Results:   Labs Ordered and Resulted from Time of ED Arrival to Time of ED Departure   COMPREHENSIVE METABOLIC PANEL - Abnormal       Result Value    Sodium 133 (*)     Potassium 4.9      Carbon Dioxide (CO2) 27      Anion Gap 9      Urea Nitrogen 32.5 (*)     Creatinine 1.19 (*)     GFR Estimate 41 (*)     Calcium 10.1 (*)     Chloride 97 (*)     Glucose 114 (*)     Alkaline Phosphatase 64      AST 35      ALT 23      Protein Total 5.9 (*)     Albumin 4.3      Bilirubin Total 0.5     BLOOD GAS VENOUS - Abnormal    pH Venous 7.39      pCO2 Venous 47      pO2 Venous 43      Bicarbonate Venous 29 (*)     Base Excess/Deficit Venous 3.1 (*)     FIO2 0      Oxyhemoglobin Venous 76 (*)     O2 Sat, Venous 77.2 (*)    TROPONIN T, HIGH SENSITIVITY - Abnormal    Troponin T, High Sensitivity 41 (*)    CBC WITH PLATELETS AND DIFFERENTIAL - Abnormal    WBC Count 5.9      RBC Count 3.24 (*)     Hemoglobin 11.1 (*)     Hematocrit 32.7 (*)      (*)     MCH 34.3 (*)     MCHC 33.9      RDW 12.4      Platelet Count 119 (*)     % Neutrophils 74      % Lymphocytes 14      % Monocytes 9      % Eosinophils 3      % Basophils 0      % Immature Granulocytes 0      NRBCs per 100 WBC 0      Absolute Neutrophils 4.3      Absolute Lymphocytes 0.8      Absolute Monocytes 0.5      Absolute Eosinophils 0.2      Absolute Basophils 0.0      Absolute Immature Granulocytes 0.0      Absolute NRBCs 0.0     NT PROBNP INPATIENT - Normal    N terminal Pro BNP Inpatient 600     INFLUENZA  A/B, RSV, & SARS-COV2 PCR - Normal    Influenza A PCR Negative      Influenza B PCR Negative      RSV PCR Negative      SARS CoV2 PCR Negative     TROPONIN T, HIGH SENSITIVITY        XR Chest 2 Views   Final Result   IMPRESSION: Cardiomegaly. Pulmonary vascularity normal. Prominent costochondral calcifications bilaterally. Asymmetric density right upper lobe and mediastinum, indeterminate. Mass not excluded. There is also a circular nodule versus artifact measuring    1.7 cm mid left lung. CT recommended for further evaluation. Old left rib fractures. Advanced degenerative changes right shoulder.      Humerus XR, G/E 2 views, right   Final Result   IMPRESSION: No acute displaced right humerus fracture is identified. Chronic advanced arthropathy at the glenohumeral joint with chronic remodeling of the coracoid process. Axillary view of the right shoulder suggested if there is concern for    glenohumeral joint malalignment. Arthrosis at the AC joint with lateral downsloping of the acromion and diffuse bone demineralization. No appreciable elbow joint effusion. Visualized right lung is grossly clear.      CT Cervical Spine w/o Contrast   Final Result   IMPRESSION:   HEAD CT:   1.  Age-indeterminate, however likely chronic left corona radiata and corpus striatal infarcts.   2.  No acute intracranial hemorrhage.      CERVICAL SPINE CT:   1.  No acute cervical spine fracture.      CT Head w/o Contrast   Final Result   IMPRESSION:   HEAD CT:   1.  Age-indeterminate, however likely chronic left corona radiata and corpus striatal infarcts.   2.  No acute intracranial hemorrhage.      CERVICAL SPINE CT:   1.  No acute cervical spine fracture.      Chest CT w IV contrast only, TRAUMA / DISSECTION    (Results Pending)       Treatments provided: See MAR  Family Comments: Daughter at bedside  OBS brochure/video discussed/provided to patient:  N/A  ED Medications:   Medications   sodium chloride 0.9% BOLUS 500 mL (500 mLs  Intravenous $New Bag 4/9/24 2040)       Drips infusing:  Yes  For the majority of the shift this patient was Green.   Interventions performed were N/A.    Sepsis treatment initiated: No    Cares/treatment/interventions/medications to be completed following ED care: None    ED Nurse Name: Oanh Shirley RN  8:49 PM  RECEIVING UNIT ED HANDOFF REVIEW    Above ED Nurse Handoff Report was reviewed: Yes  Reviewed by: Alex Morales RN on April 10, 2024 at 2:01 PM   BRUNILDA Reid called the ED to inform them the note was read: Yes      yes

## 2024-04-10 NOTE — PROGRESS NOTES
[facility]    ED Boarding Nurse Handoff Addendum Report:    Date/time: 4/10/2024, 2:22 PM    Activity Level: assist of 1 and assist of 2    Fall Risk: Yes:  bed/chair alarm on and mobility aid in reach    Active Infusions: None    Current Meds Due: None    Current care needs: Ambulation assistance, eating assistance     Oxygen requirements (liters/min and/or FiO2): 1 L NC    Respiratory status: Nasal cannula    Vital signs (within last 30 minutes):    Vitals:    04/10/24 0257 04/10/24 0700 04/10/24 0747 04/10/24 1410   BP: (!) 158/69  (!) 161/81 116/53   BP Location: Left arm  Right arm    Pulse: 65 71  66   Resp: 20 14  17   Temp: 97.6  F (36.4  C)  97.4  F (36.3  C) 97.8  F (36.6  C)   TempSrc: Oral  Oral Oral   SpO2: 100% 99%  99%   Weight:       Height:           Focused assessment within last 30 minutes:    A&Ox4,  VSS on 1 L NC. Tele: SR. Bilateral leg ulcers currently being cared for by outpatient wound clinic. Drsg changed yesterday. Generalized bruising.     ED Boarding Nurse name: Arlyn Michel RN

## 2024-04-10 NOTE — PROGRESS NOTES
04/10/24 1400   Appointment Info   Signing Clinician's Name / Credentials (PT) Lakeshia Prasad, PT   Living Environment   People in Home alone   Current Living Arrangements independent living facility   Home Accessibility no concerns   Living Environment Comments PT lives alone in an ILF.  Pt uses a 4WW for all mobility.  Pt is Ind with all ADLs except for receiving A for showering once per week.  Dtr does the grocery shopping.  Pt makes own breakfast and lunch and amb to dinning room for dinner.   Self-Care   Usual Activity Tolerance good   Current Activity Tolerance moderate   Regular Exercise Yes   Activity/Exercise Type walking;other (see comments)  (walking halls and Nustep)   Equipment Currently Used at Home walker, rolling   Fall history within last six months yes   Number of times patient has fallen within last six months 3   Activity/Exercise/Self-Care Comment Pt does own laundry and has assist once per week for cleaning.  Pt walks daily and does the Nustep.  Pt manages own meds.   General Information   Onset of Illness/Injury or Date of Surgery 04/09/24   Referring Physician Sanjuana Ash PA-C   Patient/Family Therapy Goals Statement (PT) Pt and daughter prefer for pt to return home with increased services   Pertinent History of Current Problem (include personal factors and/or comorbidities that impact the POC) Per medical chart: Dayana Samano is a 89 year old female with history of CVA and hypertension who lives independently and ambulates with a walker admitted on 4/9/2024 with increased generalized weakness, 3 falls in the last couple days, lack of appetite and shortness of breath.  Pt dx with acute hypoxic respiratory failure and NSTEMI   Existing Precautions/Restrictions fall;oxygen therapy device and L/min   General Observations Pt lying in bed with daughter present.  HR 70 bpm, /53 and SpO2  97% on 1L   Cognition   Affect/Mental Status (Cognition) WNL   Orientation Status (Cognition) oriented  x 3   Cognitive Status Comments Pt pleasant and cooperative   Pain Assessment   Patient Currently in Pain Yes, see Vital Sign flowsheet  (Pt reports R > L shoulder pain and R rib fx pain with mobilty- did not rate)   Integumentary/Edema   Integumentary/Edema Comments bruising noted on back, buttocks, chest   Posture    Posture Forward head position;Protracted shoulders   Posture Comments cervical flexion to ~90deeg   Range of Motion (ROM)   ROM Comment significantly limited B shld ROM.  R active flex to ~ 10 deg, L to ~ 20 deg, R shld flex PROM to ~ 130 deg, L to ~ 80 deg.  (pt reports hx of rotator cuff issues), tight hamstrings and gastrocs B with B SLR to ~ 60 deg and ankle DF to ~ 5-10 deg   Strength (Manual Muscle Testing)   Strength (Manual Muscle Testing) Deficits observed during functional mobility   Strength Comments Decreased B UE strength at shoulders.  Pt unable to lift UEs against gravity more than 10-30 deg.  Decreased B LE strength. B hip flex 4-/5, B knee ext ~4/5   Bed Mobility   Bed Mobility supine-sit   Comment, (Bed Mobility) sup > sit from [facility] with Min A at trunk.   Transfers   Transfers sit-stand transfer   Sit-Stand Transfer   Comment, (Sit-Stand Transfer) sit > stand from [facility] CGA to 4WW (high surface with feet dangling off ground)   Gait/Stairs (Locomotion)   [place] Level (Gait) contact guard   Assistive Device (Gait) walker, 4-wheeled   Distance in Feet (Gait) 2   Comment, (Gait/Stairs) Pt amb bed > commode with 4WW and CGA   Sensory Examination   Sensory Perception patient reports no sensory changes   Coordination   Coordination no deficits were identified   Muscle Tone   Muscle Tone no deficits were identified   Clinical Impression   Criteria for Skilled Therapeutic Intervention Yes, treatment indicated   PT Diagnosis (PT) Impaired functional mobility   Influenced by the following impairments R shoulder and rib pain, decreased UE and LE strength, impaired balance, decreased  activity tolerance   Functional limitations due to impairments Impaired gait, pt desaturates on RA with mobiity, assisted mobility and ADLs, increased falls risk   Clinical Presentation (PT Evaluation Complexity) stable   Clinical Presentation Rationale Pt medically stable with multiple comorbidities and advanced age   Clinical Decision Making (Complexity) low complexity   Planned Therapy Interventions (PT) balance training;bed mobility training;cryotherapy;gait training;home exercise program;patient/family education;ROM (range of motion);strengthening;transfer training;progressive activity/exercise;risk factor education;home program guidelines   Risk & Benefits of therapy have been explained evaluation/treatment results reviewed;care plan/treatment goals reviewed;risks/benefits reviewed;current/potential barriers reviewed;participants voiced agreement with care plan;participants included;patient;daughter   PT Total Evaluation Time   PT Desireeal, Low Complexity Minutes (91453) 15   Physical Therapy Goals   PT Frequency Daily   PT Predicted Duration/Target Date for Goal Attainment 04/12/24   PT Goals Bed Mobility;Transfers;Gait   PT: Bed Mobility Independent;Supine to/from sit   PT: Transfers Modified independent;Sit to/from stand;Assistive device  (with safe use of 4WW brakes)   PT: Gait Modified independent;Rolling walker;150 feet  (SpO2 >/= 90% on RA)   PT Discharge Planning   PT Plan Progress bed mobility (gets out on R side of bed at home), safety with transfers and 4WW brake use, progress amb distance, wean off O2 as able   PT Discharge Recommendation (DC Rec) Transitional Care Facility;home with assist;home with home care physical therapy   PT Rationale for DC Rec Pt is below baseline with mobility.  Pt lives alone at an [facility] and was independent with  mobility, using a 4WW and all ADLs, except for A with showering.  Pt has had 3 falls in the past few days.  Pt currently requires Min A with bed mobility and  transfers from a lower surface.  Pt also desaturated to 87% on room air, with gait up to 80'.  Recommend TCU to increase activity tolerance, strength, balance and progress safety and independence with all mobility.  If pt returns to her apt, recommend A x 1 with all mobility and ADLs, including amb to dinning room or WC transport if  O2 sats low.  HHPT to address afore mentioned deficits.   PT Brief overview of current status Min A bed mobility and transfers, A x 1 4WW up to 80' (desaturates to 87% on RA)   PT Equipment Needed at Discharge shower chair   Total Session Time   Total Session Time (sum of timed and untimed services) 15

## 2024-04-10 NOTE — CONSULTS
Care Management Initial Consult    General Information  Assessment completed with: Patient, Patient  Type of CM/SW Visit: Initial Assessment    Primary Care Provider verified and updated as needed: No   Readmission within the last 30 days: no previous admission in last 30 days      Reason for Consult: discharge planning  Advance Care Planning:            Communication Assessment  Patient's communication style: spoken language (English or Bilingual)             Cognitive  Cognitive/Neuro/Behavioral: WDL                      Living Environment:   People in home: alone     Current living Arrangements: apartment, independent living facility      Able to return to prior arrangements: yes       Family/Social Support:  Care provided by: self  Provides care for: no one  Marital Status:  (2 husbands have passed away)  Children (2 daughters, one daughter is RN)          Description of Support System: Supportive, Involved    Support Assessment: Adequate family and caregiver support, Patient communicates needs well met    Current Resources:   Patient receiving home care services: No     Community Resources: None  Equipment currently used at home:    Supplies currently used at home: Incontinence Supplies    Employment/Financial:  Employment Status: retired        Financial Concerns: none   Referral to Financial Worker: No       Does the patient's insurance plan have a 3 day qualifying hospital stay waiver?  Yes     Which insurance plan 3 day waiver is available? ACO reach    Will the waiver be used for post-acute placement? Undetermined at this time    Lifestyle & Psychosocial Needs:  Social Determinants of Health     Food Insecurity: No Food Insecurity (8/2/2023)    Hunger Vital Sign     Worried About Running Out of Food in the Last Year: Never true     Ran Out of Food in the Last Year: Never true   Depression: At risk (3/21/2024)    PHQ-2     PHQ-2 Score: 4   Housing Stability: Low Risk  (8/2/2023)    Housing Stability  Vital Sign     Unable to Pay for Housing in the Last Year: No     Number of Places Lived in the Last Year: 1     Unstable Housing in the Last Year: No   Tobacco Use: Medium Risk (4/1/2024)    Patient History     Smoking Tobacco Use: Former     Smokeless Tobacco Use: Never     Passive Exposure: Past   Financial Resource Strain: Low Risk  (8/2/2023)    Overall Financial Resource Strain (CARDIA)     Difficulty of Paying Living Expenses: Not hard at all   Alcohol Use: Not At Risk (8/2/2023)    AUDIT-C     Frequency of Alcohol Consumption: 2-3 times a week     Average Number of Drinks: 1 or 2     Frequency of Binge Drinking: Never   Transportation Needs: No Transportation Needs (8/2/2023)    PRAPARE - Transportation     Lack of Transportation (Medical): No     Lack of Transportation (Non-Medical): No   Physical Activity: Insufficiently Active (8/2/2023)    Exercise Vital Sign     Days of Exercise per Week: 7 days     Minutes of Exercise per Session: 20 min   Interpersonal Safety: Low Risk  (3/28/2024)    Interpersonal Safety     Do you feel physically and emotionally safe where you currently live?: Yes     Within the past 12 months, have you been hit, slapped, kicked or otherwise physically hurt by someone?: No     Within the past 12 months, have you been humiliated or emotionally abused in other ways by your partner or ex-partner?: No   Stress: No Stress Concern Present (8/2/2023)    [employer] [place] [employer] - Occupational Stress Questionnaire     Feeling of Stress : Not at all   Social Connections: Moderately Isolated (8/2/2023)    Social Connection and Isolation Panel [NHANES]     Frequency of Communication with Friends and Family: More than three times a week     Frequency of Social Gatherings with Friends and Family: More than three times a week     Attends Muslim Services: More than 4 times per year     Active Member of Clubs or Organizations: No     Attends Club or Organization Meetings: Not on  "file     Marital Status:    Health Literacy: Not on file       Functional Status:  Prior to admission patient needed assistance:   Dependent ADLs:: Ambulation-walker  Dependent IADLs:: Independent       Mental Health Status:  Mental Health Status: No Current Concerns       Chemical Dependency Status:  Chemical Dependency Status: No Current Concerns             Values/Beliefs:  Spiritual, Cultural Beliefs, Jew Practices, Values that affect care:                 Additional Information:  Met with patient at bedside while boarding in the ED.     Patient lives at [facility]. She does not pay for any services. Patient states \"I do my pills and pay my bills.\" Patient has a 4ww that is with her in the ED. Patient has 2 daughters in the area. One of her daughters is an RN. Patient states she is independent in her cares but it is getting harder for her to grasp items.     Patient has been to [facility] in the past. Patient is unsure about returning to TCU. Will follow PT recs.       SW will continue to follow.     Addendum 1512: PT recs TCU vs return to [facility] with services. SW met with patient and daughter at bedside. Patient has had an RN assessment in the past, but not recently. They would prefer to return with services. They gave SW permission to contact the DON at [facility]. RN line: [phone]. Fax:[phone]. They state she is SOSA with showers only and linen. They would have to do an on-site assessment before adding services. They will call SW back once they have a time arranged. Updated patient and family. They agree to on-site assessment.     WHIT Goodson, SW  Emergency Room   Please contact the SW on the floor in which the patient is staying for any questions or concerns      "

## 2024-04-10 NOTE — PLAN OF CARE
"Pt reports of mild pain. U/A collected. 2L oxygen NC. TELE SR 70s assist of 2 to bedside commode.  Problem: Adult Inpatient Plan of Care  Goal: Plan of Care Review  Description: The Plan of Care Review/Shift note should be completed every shift.  The Outcome Evaluation is a brief statement about your assessment that the patient is improving, declining, or no change.  This information will be displayed automatically on your shift  note.  Outcome: Progressing  Flowsheets (Taken 4/10/2024 1421)  Plan of Care Reviewed With: patient  Overall Patient Progress: improving  Goal: Patient-Specific Goal (Individualized)  Description: You can add care plan individualizations to a care plan. Examples of Individualization might be:  \"Parent requests to be called daily at 9am for status\", \"I have a hard time hearing out of my right ear\", or \"Do not touch me to wake me up as it startles  me\".  Outcome: Progressing  Goal: Absence of Hospital-Acquired Illness or Injury  Outcome: Progressing  Goal: Optimal Comfort and Wellbeing  Outcome: Progressing  Goal: Readiness for Transition of Care  Outcome: Progressing   Goal Outcome Evaluation:      Plan of Care Reviewed With: patient    Overall Patient Progress: improvingOverall Patient Progress: improving           "

## 2024-04-10 NOTE — H&P
[facility]    History and Physical - Hospitalist Service       Date of Admission:  4/9/2024    Assessment & Plan      Dayana Samano is a 89 year old female with history of CVA and hypertension who lives independently and ambulates with a walker admitted on 4/9/2024 with increased generalized weakness, 3 falls in the last couple days, lack of appetite and shortness of breath.     In the ED she was afebrile with heart rate of 77, pressure 132/64 and breathing 22 breaths/min at 92% oxygen saturation.  The ED stated she got all the way down to 90% oxygen saturation and they put on 2 L of oxygen with improvement.  Lab work remarkable for creatinine 1.19, BUN 32.5, sodium 133, calcium 10.1, normal LFTs, glucose 114, , high-sensitivity troponin of 41 and CBC remarkable for WBC 5.9 and hemoglobin 11.1.  COVID/influenza/RSV are negative.  CT head and CT cervical spine are negative.  Right humerus x-ray does not show any fracture and chest x-ray shows cardiomegaly, and asymmetric density of the right upper lobe and mediastinum that is indeterminant.  Also seen are old left rib fractures.  CT of the chest was ordered in the ED and pending at this time and she was given 500 mL of fluids with admission request.    # Shortness of breath with acute hypoxemic respiratory failure  -Patient presents with shortness of breath that is developed over the last 24 to 36 hours without significant cough, chest discomfort or evidence of fluid overload  -She has fallen recently and does have an ecchymotic region on her chest wall but she states this is not painful and she is able to take deep breaths  -She and family report that she has just not been feeling well recently with poor intake and generalized weakness.  She has been on multiple different antibiotics for lower extremity wounds recently  -Chest x-ray does not show obvious pneumonia, CT scan is ordered due to findings of asymmetric density of the right  upper lobe and mediastinum that is indeterminate  -Low suspicion for PE given presentation  -Will obtain echocardiogram but BNP is normal, troponin mildly elevated which will be discussed below  -Will obtain UA and blood cultures given generalized weakness and poor oral intake  -Per patient's family she was evaluated by wound clinic today with significant improvement of her lower extremity wounds and ended her antibiotic treatment with Bactrim on 4/8  -Continue to wean oxygen  -Incentive spirometry    # NSTEMI-suspect demand  -Troponin mildly elevated at 41 without complaint of chest pain but she is having new shortness of breath without history of coronary disease  -Will repeat troponin, monitor on telemetry and obtain echocardiogram  -continue PTA full dose Aspirin    # Acute renal insufficiency  #Hyponatremia  -Baseline creatinine of 0.8-0.9 with admission creatinine of 1.19 suspect due to poor oral intake and possibly recent Bactrim use  -Hold lisinopril and Lasix  -Sodium low at 133, recheck in AM  -Avoid nephrotoxins    # Generalized weakness with more frequent falling  -She reports 3 falls in the last couple days all of them related to weakness when she is trying to stand and unable to.  She has fallen onto her arm and has a significant bruise on her chest wall.  No fractures seen on right humerus x-ray or chest x-ray  -Clinically she does appear dehydrated and lab work would support this.  It sounds like she has not been eating well recently which I suspect is related to multiple different antibiotics for her lower leg wounds  -LR at 75 mL/h tonight  -Infectious workup as above  -PT and social work consults  -She lives independently so there is a high likelihood she may need rehab    # Right shoulder pain  -Patient does have increased right shoulder pain after her fall.  She has the most pain when lifting her right arm in the shoulder joint.  I am able to passively move the arm without any pain.  Suspect  underlying rotator cuff injury.  -Tylenol and icing for pain    # Chest wall ecchymosis  -Does not seem to bother her  -CT scan as above    # Lower extremity wounds  -Follows with wound clinic and she reports that she had an appointment with them today with improvement of her wounds and just ended her third course of antibiotics on 4/8  -Per her daughter she would like to wrap them daily so we will not do wound care nurse consult.  They are due to be rewrapped on 4/11    # Hypertension  -Hold daily Lasix and Lisinopril            Diet:  Regular diet  DVT Prophylaxis: Enoxaparin (Lovenox) SQ  Martinez Catheter: Not present  Lines: None     Cardiac Monitoring: None  Code Status:  DNR/DNI    Clinically Significant Risk Factors Present on Admission                # Drug Induced Platelet Defect: home medication list includes an antiplatelet medication   # Hypertension: Noted on problem list                 Disposition Plan      Expected Discharge Date: 04/11/2024                The patient's care was discussed with the Attending Physician, Dr. Corey, Patient, Patient's Family, and ED Consultant(s).    Sanjuana Ash PA-C  Hospitalist Service  [facility]  Securely message with [employer] (more info)  Text page via [facility] Paging/Directory     ______________________________________________________________________    Chief Complaint   Generalized weakness, more frequent falling, poor appetite and shortness of breath    History is obtained from the patient and daughter    History of Present Illness   Dayana Samano is a 89 year old female who presents from her independent assisted living facility with multiple concerns.  She has been falling recently and fell 3 times in the last couple days.  Her and her daughter agree it seems to be weakness as she denies lightheadedness, dizziness and passing out.  She attempts to stand but is unable to and leans against a wall and then slide to the floor.  This is  caused injury to her chest wall and arm.  She also does not feel well in general and has not been eating or drinking very much.  She has no energy to do anything which is unusual for her.  She has not had fever or chills but has felt short of breath without cough.  She denies diarrhea, vomiting and urinary symptoms.  She has been on antibiotics for the past 3 to 4 weeks for lower extremity wounds that per the wound clinic are improving.  She ended antibiotics on 4/8.  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 Taking?   CALCIUM 600 + D 600-200 MG-UNIT OR TABS 4/9/2024 at AM  Yes Yes   Sig: Take 1 tablet by mouth daily    acetaminophen (TYLENOL) 500 MG tablet 4/9/2024 at AM  Yes Yes   Sig: Take 500-1,000 mg by mouth every 6 hours as needed for mild pain   amoxicillin (AMOXIL) 500 MG capsule Unknown  Yes Yes    Sig: Take 4 capsules by mouth 1 hour prior to dental appt   aspirin (ASA) 325 MG tablet 4/9/2024 at AM  Yes Yes   Sig: Take 325 mg by mouth daily   furosemide (LASIX) 40 MG tablet 4/9/2024 at AM  No Yes   Sig: Take 1 tablet (40 mg) by mouth daily   ketoconazole (NIZORAL) 2 % external shampoo Unknown  Yes Yes   latanoprost (XALATAN) 0.005 % ophthalmic solution 4/8/2024 at HS  Yes Yes   Sig: Place 1 drop into both eyes at bedtime   lisinopril (ZESTRIL) 40 MG tablet 4/9/2024 at AM  No Yes   Sig: Take 1 tablet (40 mg) by mouth daily   metoprolol tartrate (LOPRESSOR) 100 MG tablet 4/9/2024 at AM  No Yes   Sig: Take 1 tablet (100 mg) by mouth 2 times daily   multivitamin (OCUVITE) TABS tablet 4/9/2024 at AM  Yes Yes   Sig: Take 1 tablet by mouth daily   polyethylene glycol 400 (BLINK TEARS) 0.25 % SOLN ophthalmic solution Past Week  Yes Yes   Sig: Place 1 drop into both eyes 2 times daily as needed for dry eyes   potassium chloride ER (KLOR-CON M) 20 MEQ CR tablet 4/9/2024 at AM  No Yes   Sig: Take 1 tablet (20 mEq) by mouth daily   timolol maleate (TIMOPTIC) 0.5 % ophthalmic solution 4/9/2024 at AM  Yes Yes   Sig: Place 1 drop into both eyes every morning      Facility-Administered Medications: None          Physical Exam   Vital Signs: Temp: 97.8  F (36.6  C) Temp src: Oral BP: 132/64 Pulse: 77   Resp: 22 SpO2: 92 % O2 Device: None (Room air)    Weight: 115 lbs 8.34 oz    General Appearance: Frail, alert and oriented x 3, hard of hearing  Respiratory: Clear to auscultation bilaterally  Cardiovascular: RRR without murmur.  Significant bruising noted on the center to right side of her chest wall that does not seem painful to palpation or deep inspiration  GI: Bowel sounds are present without tenderness  Skin: Ecchymosis as described above on her chest wall.  Her lower extremities are wrapped as she was seen by the wound clinic earlier today with improvement of lower wounds  Other: No significant swelling noted in the  lower extremity    Medical Decision Making       55 MINUTES SPENT BY ME on the date of service doing chart review, history, exam, documentation & further activities per the note.      Data     I have personally reviewed the following data over the past 24 hrs:    5.9  \   11.1 (L)   / 119 (L)     133 (L) 97 (L) 32.5 (H) /  114 (H)   4.9 27 1.19 (H) \     ALT: 23 AST: 35 AP: 64 TBILI: 0.5   ALB: 4.3 TOT PROTEIN: 5.9 (L) LIPASE: N/A     Trop: 41 (H) BNP: 600       Imaging results reviewed over the past 24 hrs:   Recent Results (from the past 24 hour(s))   CT Head w/o Contrast    Narrative    EXAM: CT HEAD W/O CONTRAST, CT CERVICAL SPINE W/O CONTRAST  LOCATION: [facility]  DATE: 4/9/2024    INDICATION: Trauma, fall, confusion  COMPARISON: None.  TECHNIQUE:   1) Routine CT Head without IV contrast. Multiplanar reformats. Dose reduction techniques were used.  2) Routine CT Cervical Spine without IV contrast. Multiplanar reformats. Dose reduction techniques were used.    FINDINGS:   HEAD CT:   INTRACRANIAL CONTENTS: Incidental small calcified left anterior parafalcine meningioma. No intracranial hemorrhage, extraaxial collection, or mass effect.   Age-indeterminate, however likely chronic left corona radiata and corpus striatal infarcts. Small   chronic left precentral gyrus infarct at the left hand motor knob. Severe presumed chronic small vessel ischemic changes. Moderate generalized volume loss. No hydrocephalus.     VISUALIZED ORBITS/SINUSES/MASTOIDS: No intraorbital abnormality. No significant paranasal sinus mucosal disease. No middle ear or mastoid effusion.    BONES/SOFT TISSUES: No acute abnormality.    CERVICAL SPINE CT:   VERTEBRA: Exaggeration of the usual cervical lordosis. Minimal stepwise anterolisthesis from C2-C6. Chronic vertebral body height loss at T1-T2 with ankylosis. Ankylosis of the C2-C4 lateral masses. No acute compression fracture or traumatic  subluxation.    CANAL/FORAMINA: Multilevel spondylosis without high grade canal stenosis.    PARASPINAL: No prevertebral edema. Chronic right second posterior rib fracture deformity.      Impression    IMPRESSION:  HEAD CT:  1.  Age-indeterminate, however likely chronic left corona radiata and corpus striatal infarcts.  2.  No acute intracranial hemorrhage.    CERVICAL SPINE CT:  1.  No acute cervical spine fracture.   CT Cervical Spine w/o Contrast    Narrative    EXAM: CT HEAD W/O CONTRAST, CT CERVICAL SPINE W/O CONTRAST  LOCATION: [facility]  DATE: 4/9/2024    INDICATION: Trauma, fall, confusion  COMPARISON: None.  TECHNIQUE:   1) Routine CT Head without IV contrast. Multiplanar reformats. Dose reduction techniques were used.  2) Routine CT Cervical Spine without IV contrast. Multiplanar reformats. Dose reduction techniques were used.    FINDINGS:   HEAD CT:   INTRACRANIAL CONTENTS: Incidental small calcified left anterior parafalcine meningioma. No intracranial hemorrhage, extraaxial collection, or mass effect.   Age-indeterminate, however likely chronic left corona radiata and corpus striatal infarcts. Small   chronic left precentral gyrus infarct at the left hand motor knob. Severe presumed chronic small vessel ischemic changes. Moderate generalized volume loss. No hydrocephalus.     VISUALIZED ORBITS/SINUSES/MASTOIDS: No intraorbital abnormality. No significant paranasal sinus mucosal disease. No middle ear or mastoid effusion.    BONES/SOFT TISSUES: No acute abnormality.    CERVICAL SPINE CT:   VERTEBRA: Exaggeration of the usual cervical lordosis. Minimal stepwise anterolisthesis from C2-C6. Chronic vertebral body height loss at T1-T2 with ankylosis. Ankylosis of the C2-C4 lateral masses. No acute compression fracture or traumatic subluxation.    CANAL/FORAMINA: Multilevel spondylosis without high grade canal stenosis.    PARASPINAL: No prevertebral edema. Chronic right second  posterior rib fracture deformity.      Impression    IMPRESSION:  HEAD CT:  1.  Age-indeterminate, however likely chronic left corona radiata and corpus striatal infarcts.  2.  No acute intracranial hemorrhage.    CERVICAL SPINE CT:  1.  No acute cervical spine fracture.   Humerus XR, G/E 2 views, right    Narrative    EXAM: XR HUMERUS RIGHT G/E 2 VIEWS  LOCATION: [facility]  DATE: 4/9/2024    INDICATION: Right arm pain after fall.  COMPARISON: None.      Impression    IMPRESSION: No acute displaced right humerus fracture is identified. Chronic advanced arthropathy at the glenohumeral joint with chronic remodeling of the coracoid process. Axillary view of the right shoulder suggested if there is concern for   glenohumeral joint malalignment. Arthrosis at the AC joint with lateral downsloping of the acromion and diffuse bone demineralization. No appreciable elbow joint effusion. Visualized right lung is grossly clear.   XR Chest 2 Views    Narrative    EXAM: XR CHEST 2 VIEWS  LOCATION: [facility]  DATE: 4/9/2024    INDICATION: dyspnea  COMPARISON: None.      Impression    IMPRESSION: Cardiomegaly. Pulmonary vascularity normal. Prominent costochondral calcifications bilaterally. Asymmetric density right upper lobe and mediastinum, indeterminate. Mass not excluded. There is also a circular nodule versus artifact measuring   1.7 cm mid left lung. CT recommended for further evaluation. Old left rib fractures. Advanced degenerative changes right shoulder.

## 2024-04-11 ENCOUNTER — APPOINTMENT (OUTPATIENT)
Dept: GENERAL RADIOLOGY | Facility: CLINIC | Age: 89
DRG: 166 | End: 2024-04-11
Attending: STUDENT IN AN ORGANIZED HEALTH CARE EDUCATION/TRAINING PROGRAM
Payer: MEDICARE

## 2024-04-11 ENCOUNTER — APPOINTMENT (OUTPATIENT)
Dept: PHYSICAL THERAPY | Facility: CLINIC | Age: 89
DRG: 166 | End: 2024-04-11
Payer: MEDICARE

## 2024-04-11 LAB
ANION GAP SERPL CALCULATED.3IONS-SCNC: 8 MMOL/L (ref 7–15)
BUN SERPL-MCNC: 19.1 MG/DL (ref 8–23)
CALCIUM SERPL-MCNC: 9.5 MG/DL (ref 8.2–9.6)
CHLORIDE SERPL-SCNC: 101 MMOL/L (ref 98–107)
CREAT SERPL-MCNC: 0.95 MG/DL (ref 0.51–0.95)
DEPRECATED HCO3 PLAS-SCNC: 25 MMOL/L (ref 22–29)
EGFRCR SERPLBLD CKD-EPI 2021: 54 ML/MIN/1.73M2
ERYTHROCYTE [DISTWIDTH] IN BLOOD BY AUTOMATED COUNT: 12.4 % (ref 10–15)
GLUCOSE SERPL-MCNC: 99 MG/DL (ref 70–99)
HCT VFR BLD AUTO: 32.1 % (ref 35–47)
HGB BLD-MCNC: 10.3 G/DL (ref 11.7–15.7)
MCH RBC QN AUTO: 33.7 PG (ref 26.5–33)
MCHC RBC AUTO-ENTMCNC: 32.1 G/DL (ref 31.5–36.5)
MCV RBC AUTO: 105 FL (ref 78–100)
PHOSPHATE SERPL-MCNC: 2.8 MG/DL (ref 2.5–4.5)
PLATELET # BLD AUTO: 99 10E3/UL (ref 150–450)
POTASSIUM SERPL-SCNC: 4.5 MMOL/L (ref 3.4–5.3)
RBC # BLD AUTO: 3.06 10E6/UL (ref 3.8–5.2)
SODIUM SERPL-SCNC: 134 MMOL/L (ref 135–145)
VIT B12 SERPL-MCNC: 293 PG/ML (ref 232–1245)
WBC # BLD AUTO: 3.4 10E3/UL (ref 4–11)

## 2024-04-11 PROCEDURE — 999N000157 HC STATISTIC RCP TIME EA 10 MIN

## 2024-04-11 PROCEDURE — 99232 SBSQ HOSP IP/OBS MODERATE 35: CPT | Performed by: STUDENT IN AN ORGANIZED HEALTH CARE EDUCATION/TRAINING PROGRAM

## 2024-04-11 PROCEDURE — 250N000013 HC RX MED GY IP 250 OP 250 PS 637: Performed by: PHYSICIAN ASSISTANT

## 2024-04-11 PROCEDURE — 120N000001 HC R&B MED SURG/OB

## 2024-04-11 PROCEDURE — 97116 GAIT TRAINING THERAPY: CPT | Mod: GP

## 2024-04-11 PROCEDURE — 97110 THERAPEUTIC EXERCISES: CPT | Mod: GP

## 2024-04-11 PROCEDURE — 71045 X-RAY EXAM CHEST 1 VIEW: CPT

## 2024-04-11 PROCEDURE — 250N000013 HC RX MED GY IP 250 OP 250 PS 637: Performed by: INTERNAL MEDICINE

## 2024-04-11 PROCEDURE — 85027 COMPLETE CBC AUTOMATED: CPT | Performed by: PHYSICIAN ASSISTANT

## 2024-04-11 PROCEDURE — 84100 ASSAY OF PHOSPHORUS: CPT | Performed by: EMERGENCY MEDICINE

## 2024-04-11 PROCEDURE — 82607 VITAMIN B-12: CPT | Performed by: STUDENT IN AN ORGANIZED HEALTH CARE EDUCATION/TRAINING PROGRAM

## 2024-04-11 PROCEDURE — 250N000013 HC RX MED GY IP 250 OP 250 PS 637: Performed by: STUDENT IN AN ORGANIZED HEALTH CARE EDUCATION/TRAINING PROGRAM

## 2024-04-11 PROCEDURE — 94150 VITAL CAPACITY TEST: CPT

## 2024-04-11 PROCEDURE — 36415 COLL VENOUS BLD VENIPUNCTURE: CPT | Performed by: PHYSICIAN ASSISTANT

## 2024-04-11 PROCEDURE — 250N000011 HC RX IP 250 OP 636: Performed by: PHYSICIAN ASSISTANT

## 2024-04-11 PROCEDURE — 80048 BASIC METABOLIC PNL TOTAL CA: CPT | Performed by: PHYSICIAN ASSISTANT

## 2024-04-11 RX ORDER — LISINOPRIL 40 MG/1
40 TABLET ORAL DAILY
Status: DISCONTINUED | OUTPATIENT
Start: 2024-04-11 | End: 2024-04-12 | Stop reason: HOSPADM

## 2024-04-11 RX ORDER — FUROSEMIDE 40 MG
40 TABLET ORAL DAILY
Status: DISCONTINUED | OUTPATIENT
Start: 2024-04-12 | End: 2024-04-12 | Stop reason: HOSPADM

## 2024-04-11 RX ORDER — LATANOPROST 50 UG/ML
1 SOLUTION/ DROPS OPHTHALMIC AT BEDTIME
Status: DISCONTINUED | OUTPATIENT
Start: 2024-04-11 | End: 2024-04-12 | Stop reason: HOSPADM

## 2024-04-11 RX ADMIN — LATANOPROST 1 DROP: 50 SOLUTION/ DROPS OPHTHALMIC at 23:46

## 2024-04-11 RX ADMIN — ASPIRIN 325 MG ORAL TABLET 325 MG: 325 PILL ORAL at 08:14

## 2024-04-11 RX ADMIN — LISINOPRIL 40 MG: 40 TABLET ORAL at 18:09

## 2024-04-11 RX ADMIN — ENOXAPARIN SODIUM 30 MG: 30 INJECTION SUBCUTANEOUS at 08:15

## 2024-04-11 RX ADMIN — ACETAMINOPHEN 975 MG: 325 TABLET, FILM COATED ORAL at 15:38

## 2024-04-11 RX ADMIN — METOPROLOL TARTRATE 100 MG: 50 TABLET, FILM COATED ORAL at 21:20

## 2024-04-11 RX ADMIN — ACETAMINOPHEN 975 MG: 325 TABLET, FILM COATED ORAL at 06:03

## 2024-04-11 RX ADMIN — TIMOLOL MALEATE 1 DROP: 5 SOLUTION/ DROPS OPHTHALMIC at 08:16

## 2024-04-11 RX ADMIN — METOPROLOL TARTRATE 100 MG: 50 TABLET, FILM COATED ORAL at 08:14

## 2024-04-11 ASSESSMENT — ACTIVITIES OF DAILY LIVING (ADL)
ADLS_ACUITY_SCORE: 45
ADLS_ACUITY_SCORE: 46
ADLS_ACUITY_SCORE: 45
ADLS_ACUITY_SCORE: 45
ADLS_ACUITY_SCORE: 46
ADLS_ACUITY_SCORE: 45
ADLS_ACUITY_SCORE: 46
ADLS_ACUITY_SCORE: 45
ADLS_ACUITY_SCORE: 45
ADLS_ACUITY_SCORE: 46
ADLS_ACUITY_SCORE: 50
ADLS_ACUITY_SCORE: 46
ADLS_ACUITY_SCORE: 45
ADLS_ACUITY_SCORE: 51
ADLS_ACUITY_SCORE: 46
ADLS_ACUITY_SCORE: 45
ADLS_ACUITY_SCORE: 45

## 2024-04-11 NOTE — CONSULTS
"CLINICAL NUTRITION SERVICES  -  ASSESSMENT NOTE    Recommendations Ordered by Registered Dietitian (RD):   - Ordered ensure 1x daily per pt request  - Encourage adequate intake     MALNUTRITION:  % Weight Loss:  None noted  % Intake:  Decreased intake does not meet criteria for malnutrition -- pt noted decreased intake for 2-3 days PTA  Subcutaneous Fat Loss:  mild losses appropriate for aging  Muscle Loss:  mild losses appropriate for aging  Fluid Retention:  None noted    Malnutrition Diagnosis: Patient does not meet two of the above criteria necessary for diagnosing malnutrition      REASON FOR ASSESSMENT  Dayana Samano is a 89 year old female seen by Registered Dietitian for Provider Order - \"assess for malnutrition\"     Dayana Samano admitted for generalized weakness, 3 falls, lack of appetite, and SOB.   Found to have acute hypoxic respiratory failure, NSTEMI, right rib fractures and manubrium fracture.     PMH: CVA, HTN    NUTRITION HISTORY  Information obtained from pt.   - from independent living facility  - Per PT note on 4/10, pt's daughter does grocery shopping. Pt will make her own breakfast and lunch and then go to the dining room for dinner.   - Pt stated if was difficult for her to eat for a couple days PTA d/t the pain she was in after her falls. It was also hard for her to reach certain foods in her cupboard.   - When she feels well she typically eats about 3 meals per day with one ensure. She likes all flavors of ensure.     CURRENT NUTRITION ORDERS  Diet Order:  Regular     Current Intake/Tolerance:   - Per [facility]: pt ordered 3 meals yesterday and breakfast this morning  - Per Flowsheet: 75% intakes.   - Per pt report: Pt reports her appetite has significantly improved. She ate a big breakfast this morning which she said was the biggest meals she's had in a couple of days. Denies n/v.   - RD encouraged adequate intake with 3 meals/day as well as continuing her daily ensure in the hospital. " "Pt was agreeable.     Food Allergies/Intolerances: NKFA    Labs: reviewed; Na 134 (L)    Medications: reviewed     Skin: no edema; lower extremity wounds noted upon admit    I/Os: 1 BM noted 4/10      ANTHROPOMETRICS  Height: 5' 0\"  Weight: 117 lbs 4.56 oz  Body mass index is 22.91 kg/m .  IBW: 100 lbs  %IBW: 117%  Weight History:   Wt Readings from Last 10 Encounters:   04/11/24 53.2 kg (117 lb 4.6 oz)   04/09/24 52.2 kg (115 lb)   04/01/24 54 kg (119 lb)   03/28/24 53.9 kg (118 lb 14.4 oz)   08/02/23 53.6 kg (118 lb 1.6 oz)   11/28/22 52.5 kg (115 lb 12.8 oz)   07/12/22 53.5 kg (118 lb)   10/15/21 54.4 kg (120 lb)   10/14/21 54.4 kg (120 lb)   06/10/21 57.1 kg (125 lb 12.8 oz)   - per chart review, wt appears to be stable for the past couple years around 118 lbs  - Pt stated her wt has been stable PTA, noted no concerns about wt.        ASSESSED NUTRITION NEEDS PER APPROVED PRACTICE GUIDELINES:  Dosing Weight 53.2 kg  Estimated Energy Needs: 2369-5812 kcals (25-30 Kcal/Kg)  Justification: maintenance  Estimated Protein Needs: 64-80 grams protein (1.2-1.5 g pro/Kg)  Justification: maintenance  Estimated Fluid Needs: 1 mL/Kcal  Justification: maintenance OR per provider pending fluid status      NUTRITION DIAGNOSIS:  Inadequate oral intake related to mechanical falls x 3 as evidenced by report of poor appetite r/t pain and inability to reach for food for a couple of days PTA.       NUTRITION INTERVENTIONS  Recommendations / Nutrition Prescription  See above.       Implementation  Medical Food Supplement      Nutrition Goals  % of nutritionally adequate meals/supplements TID      MONITORING AND EVALUATION:  Progress towards goals will be monitored and evaluated per protocol and Practice Guidelines      Christiana Alicea, MS, RD, LD  Clinical Dietitian  3rd floor/ICU: [phone]  All other floors: [phone]  Weekend/holiday: [phone]  Office: [phone]          "

## 2024-04-11 NOTE — PROGRESS NOTES
[facility]    Medicine Progress Note - Hospitalist Service    Date of Admission:  4/9/2024    Assessment & Plan   Dayana Samano is a 89 year old female with history of CVA and hypertension who lives independently and ambulates with a walker admitted on 4/9/2024 with increased generalized weakness, 3 falls in the last couple days, lack of appetite and shortness of breath. FTH acute rib and sternal fractures.    Shortness of breath with acute hypoxemic respiratory failure  Acute fractures of R lateral 8th and 9th ribs  Mildly comminuted fracture of manubrium  -Patient presents with shortness of breath that is developed over the last 24 to 36 hours without significant cough, chest discomfort or evidence of fluid overload  -She has fallen recently and does have an ecchymotic region on her chest wall but she states this is not painful and she is able to take deep breaths  -She and family report that she has just not been feeling well recently with poor intake and generalized weakness.  She has been on multiple different antibiotics for lower extremity wounds recently  -Chest x-ray does not show obvious pneumonia, CT showed above fractures.  -Cardiac etiology unlikely. BNP is normal, troponin mildly elevated which will be discussed below. TTE with normal EF (did show moderately severe mitral regurg).  -low suspicion for infection. UA negative. Blood cultures NGTD.  -Per patient's family she was evaluated by wound clinic today with significant improvement of her lower extremity wounds and ended her antibiotic treatment with Bactrim on 4/8  -Suspect limited inspiration 2/2 rib and sternum fracture -> hypoxia  -Continue to wean oxygen  -Scheduled tylenol  -Incentive spirometry  -Gen surgery consult for trauma eval    Generalized weakness with more frequent falling  -She reports 3 falls in the last couple days all of them related to weakness when she is trying to stand and unable to.  She has fallen  onto her arm and has a significant bruise on her chest wall.  No fractures seen on right humerus x-ray or chest x-ray  -Clinically she does appear dehydrated and lab work would support this.  It sounds like she has not been eating well recently which I suspect is related to multiple different antibiotics for her lower leg wounds  -Infectious workup as above  -PT and social work consults - TCU discharge tomorrow  -Nutrition consult    NSTEMI-suspect demand  -Troponin mildly elevated at 41 without complaint of chest pain but she is having new shortness of breath without history of coronary disease  -Repeat troponin stable  -continue PTA full dose Aspirin    Acute renal insufficiency  Hyponatremia  -Baseline creatinine of 0.8-0.9 with admission creatinine of 1.19 suspect due to poor oral intake and possibly recent Bactrim use  -Repeat Cr back to baseline  -Hold lisinopril and Lasix  -Sodium low at 133, repeat 134  -Avoid nephrotoxins    Right shoulder pain  -Patient does have increased right shoulder pain after her fall.  She has the most pain when lifting her right arm in the shoulder joint.  I am able to passively move the arm without any pain.  Suspect underlying rotator cuff injury.  -R shoulder XR  -Tylenol and icing for pain    Chest wall ecchymosis  -Does not seem to bother her, rib fracture management as above    Lower extremity wounds  -Follows with wound clinic and she reports that she had an appointment with them today with improvement of her wounds and just ended her third course of antibiotics on 4/8  -Per her daughter she would like to wrap them daily so we will not do wound care nurse consult.  They are due to be rewrapped on 4/11    Hypertension  -resume daily Lasix and Lisinopril        Diet: Combination Diet Regular Diet Adult    DVT Prophylaxis: Enoxaparin (Lovenox) SQ  Martinez Catheter: Not present  Lines: None     Cardiac Monitoring: ACTIVE order. Indication: AMI (NSTEMI/ STEMI) (48 hours)  Code  Status: No CPR- Do NOT Intubate      Clinically Significant Risk Factors                # Thrombocytopenia: Lowest platelets = 99 in last 2 days, will monitor for bleeding   # Hypertension: Noted on problem list  # Chronic heart failure with preserved ejection fraction: heart failure noted on problem list and last echo with EF >50%           # Financial/Environmental Concerns: none         Disposition Plan     Medically Ready for Discharge: Anticipated Tomorrow             Susannah Fischer DO  Hospitalist Service  [facility]  Securely message with [employer] (more info)  Text page via [employer] Paging/Directory   ______________________________________________________________________    Interval History   No acute overnight events.  Doing well today. Grateful for care. No complaints.     Physical Exam   Vital Signs: Temp: 97.9  F (36.6  C) Temp src: Oral BP: (!) 152/59 Pulse: 63   Resp: 16 SpO2: 99 % O2 Device: Nasal cannula Oxygen Delivery: 1 LPM  Weight: 117 lbs 4.56 oz    Constitutional: Awake, alert, no distress, and cooperative  Cardiovascular: Regular rate and rhythm, normal S1 and S2, no S3 or S4, and no murmur noted  Respiratory: No increased work of breathing, good air exchange, clear to auscultation bilaterally, no crackles or wheezing  Gastrointestinal: Abdomen soft, non-tender, non-distended. BS normal. No masses, organomegaly     Medical Decision Making       45 MINUTES SPENT BY ME on the date of service doing chart review, history, exam, documentation & further activities per the note.      Data     I have personally reviewed the following data over the past 24 hrs:    3.4 (L)  \   10.3 (L)   / 99 (L)     134 (L) 101 19.1 /  99   4.5 25 0.95 \

## 2024-04-11 NOTE — PROGRESS NOTES
Care Management Discharge Note    Discharge Date: 04/11/2024       Discharge Disposition: Transitional Care    Discharge Services:      Discharge DME: None    Discharge Transportation:      Private pay costs discussed: insurance costs out of pocket expenses    Does the patient's insurance plan have a 3 day qualifying hospital stay waiver?  No    PAS Confirmation Code: [id number]  Patient/family educated on Medicare website which has current facility and service quality ratings: yes    Education Provided on the Discharge Plan:    Persons Notified of Discharge Plans: Patient, MD, TCU  Patient/Family in Agreement with the Plan:      Handoff Referral Completed: No    Additional Information:  Patient has been accepted at [facility] for tomorrow. She would need to arrive between [id number]. PAS completed. See above     Addendum 1652: Daughter will transport tomorrow around 1300.       WHIT Goodson, LGSW  Emergency Room   Please contact the SW on the floor in which the patient is staying for any questions or concerns

## 2024-04-11 NOTE — PROGRESS NOTES
Care Management Follow Up    Length of Stay (days): 2    Expected Discharge Date: 04/11/2024     Concerns to be Addressed: discharge planning     Patient plan of care discussed at interdisciplinary rounds: Yes    Anticipated Discharge Disposition:  TCU      Anticipated Discharge Services:  TCU  Anticipated Discharge DME:      Patient/family educated on Medicare website which has current facility and service quality ratings:    Education Provided on the Discharge Plan:    Patient/Family in Agreement with the Plan:      Referrals Placed by CM/SW:  [facility], TCU  Private pay costs discussed: insurance costs out of pocket expenses    Additional Information:  THIAGO received call from NADREI at [facility]. P: [phone]. Afte  reviewing the paperwork that was sent yesterday, they cannot meet patient's needs since PT said TCU. SW explained that the recs are TCU or return with services as an assist x1; both would be a safe discharge plan. shelter states she has had too big of a change and cannot meet patient's needs until she goes to TCU.     Met with patient at bedside. She is agreeable to TCU but does not want [facility]. She wanted SW to call her daughter Marion for choices.     Addendum 0958: Patient and family prefer [facility]. Gave patient and family a list of other SNFs with respected ratings near [place].     WHIT Goodson, LGSW  Emergency Room   Please contact the SW on the floor in which the patient is staying for any questions or concerns

## 2024-04-11 NOTE — PLAN OF CARE
"  Problem: Adult Inpatient Plan of Care  Goal: Plan of Care Review  Description: The Plan of Care Review/Shift note should be completed every shift.  The Outcome Evaluation is a brief statement about your assessment that the patient is improving, declining, or no change.  This information will be displayed automatically on your shift  note.  Outcome: Progressing  Flowsheets (Taken 4/11/2024 0530)  Outcome Evaluation: Developed pain with activity post ambulated to the bathroom. 2/10 pain \"sore\". Scattered bruises. 1 L of O2 overnight. LS clear and diminished. A2 with walker d/t weakness. Tele SR.  Plan of Care Reviewed With: patient  Overall Patient Progress: improving  Goal: Patient-Specific Goal (Individualized)  Description: You can add care plan individualizations to a care plan. Examples of Individualization might be:  \"Parent requests to be called daily at 9am for status\", \"I have a hard time hearing out of my right ear\", or \"Do not touch me to wake me up as it startles  me\".  Outcome: Progressing  Goal: Absence of Hospital-Acquired Illness or Injury  Outcome: Progressing  Intervention: Identify and Manage Fall Risk  Recent Flowsheet Documentation  Taken 4/10/2024 2013 by Frank Darby RN  Safety Promotion/Fall Prevention:   treat underlying cause   treat reversible contributory factors   safety round/check completed   room near nurse's station   room door open  Intervention: Prevent Skin Injury  Recent Flowsheet Documentation  Taken 4/10/2024 2013 by Frank Darby RN  Body Position:   supine, legs elevated   supine, head elevated  Intervention: Prevent and Manage VTE (Venous Thromboembolism) Risk  Recent Flowsheet Documentation  Taken 4/10/2024 2013 by Frank Darby RN  VTE Prevention/Management: SCDs (sequential compression devices) off  Goal: Optimal Comfort and Wellbeing  Outcome: Progressing  Goal: Readiness for Transition of Care  Outcome: Progressing     Problem: Skin Injury Risk " Increased  Goal: Skin Health and Integrity  Outcome: Progressing  Intervention: Plan: Nurse Driven Intervention: Moisture Management  Recent Flowsheet Documentation  Taken 4/10/2024 2013 by Frank Darby RN  Bathing/Skin Care: incontinence care  Intervention: Optimize Skin Protection  Recent Flowsheet Documentation  Taken 4/10/2024 2013 by Frank Darby RN  Activity Management: ambulated to bathroom  Head of Bed (HOB) Positioning: HOB at 30 degrees     Problem: Gas Exchange Impaired  Goal: Optimal Gas Exchange  Outcome: Progressing  Intervention: Optimize Oxygenation and Ventilation  Recent Flowsheet Documentation  Taken 4/10/2024 2013 by Frank Darby RN  Head of Bed (HOB) Positioning: HOB at 30 degrees     Problem: Fall Injury Risk  Goal: Absence of Fall and Fall-Related Injury  Outcome: Progressing  Intervention: Identify and Manage Contributors  Recent Flowsheet Documentation  Taken 4/10/2024 2013 by Frank Darby RN  Medication Review/Management: medications reviewed  Intervention: Promote Injury-Free Environment  Recent Flowsheet Documentation  Taken 4/10/2024 2013 by Frank Darby RN  Safety Promotion/Fall Prevention:   treat underlying cause   treat reversible contributory factors   safety round/check completed   room near nurse's station   room door open     Problem: Pain Acute  Goal: Optimal Pain Control and Function  Outcome: Progressing  Intervention: Prevent or Manage Pain  Recent Flowsheet Documentation  Taken 4/10/2024 2013 by Frank Darby RN  Medication Review/Management: medications reviewed   Goal Outcome Evaluation:      Plan of Care Reviewed With: patient    Overall Patient Progress: improvingOverall Patient Progress: improving    Outcome Evaluation: Denied pain throughout shift. Scattered bruises. 1 L of O2 overnight. LS clear and diminished. A2 with walker d/t weakness. Tele SR.

## 2024-04-12 ENCOUNTER — APPOINTMENT (OUTPATIENT)
Dept: GENERAL RADIOLOGY | Facility: CLINIC | Age: 89
DRG: 166 | End: 2024-04-12
Attending: STUDENT IN AN ORGANIZED HEALTH CARE EDUCATION/TRAINING PROGRAM
Payer: MEDICARE

## 2024-04-12 ENCOUNTER — DOCUMENTATION ONLY (OUTPATIENT)
Dept: GERIATRICS | Facility: CLINIC | Age: 89
End: 2024-04-12
Payer: MEDICARE

## 2024-04-12 VITALS
SYSTOLIC BLOOD PRESSURE: 181 MMHG | WEIGHT: 113.8 LBS | BODY MASS INDEX: 22.34 KG/M2 | RESPIRATION RATE: 18 BRPM | OXYGEN SATURATION: 92 % | DIASTOLIC BLOOD PRESSURE: 80 MMHG | TEMPERATURE: 97.9 F | HEART RATE: 72 BPM | HEIGHT: 60 IN

## 2024-04-12 LAB
ANION GAP SERPL CALCULATED.3IONS-SCNC: 11 MMOL/L (ref 7–15)
BUN SERPL-MCNC: 20.7 MG/DL (ref 8–23)
CALCIUM SERPL-MCNC: 9.8 MG/DL (ref 8.2–9.6)
CHLORIDE SERPL-SCNC: 100 MMOL/L (ref 98–107)
CREAT SERPL-MCNC: 0.76 MG/DL (ref 0.51–0.95)
DEPRECATED HCO3 PLAS-SCNC: 24 MMOL/L (ref 22–29)
EGFRCR SERPLBLD CKD-EPI 2021: 70 ML/MIN/1.73M2
ERYTHROCYTE [DISTWIDTH] IN BLOOD BY AUTOMATED COUNT: 12.2 % (ref 10–15)
FOLATE SERPL-MCNC: 9.4 NG/ML (ref 4.6–34.8)
GLUCOSE SERPL-MCNC: 104 MG/DL (ref 70–99)
HCT VFR BLD AUTO: 34 % (ref 35–47)
HGB BLD-MCNC: 11.3 G/DL (ref 11.7–15.7)
MCH RBC QN AUTO: 33.9 PG (ref 26.5–33)
MCHC RBC AUTO-ENTMCNC: 33.2 G/DL (ref 31.5–36.5)
MCV RBC AUTO: 102 FL (ref 78–100)
PHOSPHATE SERPL-MCNC: 2.5 MG/DL (ref 2.5–4.5)
PLATELET # BLD AUTO: 122 10E3/UL (ref 150–450)
POTASSIUM SERPL-SCNC: 4.6 MMOL/L (ref 3.4–5.3)
RBC # BLD AUTO: 3.33 10E6/UL (ref 3.8–5.2)
SODIUM SERPL-SCNC: 135 MMOL/L (ref 135–145)
WBC # BLD AUTO: 3.4 10E3/UL (ref 4–11)

## 2024-04-12 PROCEDURE — 250N000013 HC RX MED GY IP 250 OP 250 PS 637: Performed by: PHYSICIAN ASSISTANT

## 2024-04-12 PROCEDURE — 99239 HOSP IP/OBS DSCHRG MGMT >30: CPT | Performed by: STUDENT IN AN ORGANIZED HEALTH CARE EDUCATION/TRAINING PROGRAM

## 2024-04-12 PROCEDURE — 82746 ASSAY OF FOLIC ACID SERUM: CPT | Performed by: STUDENT IN AN ORGANIZED HEALTH CARE EDUCATION/TRAINING PROGRAM

## 2024-04-12 PROCEDURE — 85027 COMPLETE CBC AUTOMATED: CPT | Performed by: PHYSICIAN ASSISTANT

## 2024-04-12 PROCEDURE — 80048 BASIC METABOLIC PNL TOTAL CA: CPT | Performed by: PHYSICIAN ASSISTANT

## 2024-04-12 PROCEDURE — 250N000013 HC RX MED GY IP 250 OP 250 PS 637: Performed by: STUDENT IN AN ORGANIZED HEALTH CARE EDUCATION/TRAINING PROGRAM

## 2024-04-12 PROCEDURE — 250N000011 HC RX IP 250 OP 636: Performed by: STUDENT IN AN ORGANIZED HEALTH CARE EDUCATION/TRAINING PROGRAM

## 2024-04-12 PROCEDURE — 94150 VITAL CAPACITY TEST: CPT

## 2024-04-12 PROCEDURE — 71045 X-RAY EXAM CHEST 1 VIEW: CPT

## 2024-04-12 PROCEDURE — 36415 COLL VENOUS BLD VENIPUNCTURE: CPT | Performed by: PHYSICIAN ASSISTANT

## 2024-04-12 PROCEDURE — 84100 ASSAY OF PHOSPHORUS: CPT | Performed by: STUDENT IN AN ORGANIZED HEALTH CARE EDUCATION/TRAINING PROGRAM

## 2024-04-12 RX ORDER — ACETAMINOPHEN 325 MG/1
975 TABLET ORAL EVERY 8 HOURS
DISCHARGE
Start: 2024-04-12 | End: 2024-04-15

## 2024-04-12 RX ORDER — ENOXAPARIN SODIUM 100 MG/ML
40 INJECTION SUBCUTANEOUS EVERY 24 HOURS
Status: DISCONTINUED | OUTPATIENT
Start: 2024-04-12 | End: 2024-04-12 | Stop reason: HOSPADM

## 2024-04-12 RX ADMIN — ENOXAPARIN SODIUM 40 MG: 40 INJECTION SUBCUTANEOUS at 08:22

## 2024-04-12 RX ADMIN — TIMOLOL MALEATE 1 DROP: 5 SOLUTION/ DROPS OPHTHALMIC at 08:23

## 2024-04-12 RX ADMIN — METOPROLOL TARTRATE 100 MG: 50 TABLET, FILM COATED ORAL at 08:22

## 2024-04-12 RX ADMIN — ASPIRIN 325 MG ORAL TABLET 325 MG: 325 PILL ORAL at 08:22

## 2024-04-12 RX ADMIN — LISINOPRIL 40 MG: 40 TABLET ORAL at 08:22

## 2024-04-12 RX ADMIN — FUROSEMIDE 40 MG: 40 TABLET ORAL at 08:22

## 2024-04-12 ASSESSMENT — ACTIVITIES OF DAILY LIVING (ADL)
ADLS_ACUITY_SCORE: 51
ADLS_ACUITY_SCORE: 51
ADLS_ACUITY_SCORE: 48
ADLS_ACUITY_SCORE: 51
ADLS_ACUITY_SCORE: 48
ADLS_ACUITY_SCORE: 51

## 2024-04-12 NOTE — PLAN OF CARE
Physical Therapy Discharge Summary     Reason for therapy discharge:    Discharged to transitional care facility.     Progress towards therapy goal(s). [facility] for goal details.  Goals not met.  Barriers to achieving goals:  discharge to TCU     Therapy recommendation(s):    Continued therapy is recommended.  Rationale/Recommendations:  Pt is below baseline with functional mobility and strength and would benefit from continued PT to progress skills.

## 2024-04-12 NOTE — PLAN OF CARE
"  Problem: Adult Inpatient Plan of Care  Goal: Plan of Care Review  Description: The Plan of Care Review/Shift note should be completed every shift.  The Outcome Evaluation is a brief statement about your assessment that the patient is improving, declining, or no change.  This information will be displayed automatically on your shift  note.  Outcome: Progressing  Flowsheets (Taken 4/12/2024 1828)  Outcome Evaluation: 1 L NC. Stating well. D/C today.  Plan of Care Reviewed With: patient  Overall Patient Progress: improving  Goal: Patient-Specific Goal (Individualized)  Description: You can add care plan individualizations to a care plan. Examples of Individualization might be:  \"Parent requests to be called daily at 9am for status\", \"I have a hard time hearing out of my right ear\", or \"Do not touch me to wake me up as it startles  me\".  Outcome: Progressing  Goal: Absence of Hospital-Acquired Illness or Injury  Outcome: Progressing  Intervention: Identify and Manage Fall Risk  Recent Flowsheet Documentation  Taken 4/11/2024 2120 by Frank Darby RN  Safety Promotion/Fall Prevention:   treat underlying cause   treat reversible contributory factors   safety round/check completed   room near nurse's station   room organization consistent  Intervention: Prevent Skin Injury  Recent Flowsheet Documentation  Taken 4/11/2024 2120 by Frank Darby RN  Body Position:   supine, legs elevated   supine, head elevated  Goal: Optimal Comfort and Wellbeing  Outcome: Progressing  Goal: Readiness for Transition of Care  Outcome: Progressing     Problem: Skin Injury Risk Increased  Goal: Skin Health and Integrity  Outcome: Progressing  Intervention: Optimize Skin Protection  Recent Flowsheet Documentation  Taken 4/11/2024 2120 by Frank Darby RN  Activity Management: activity adjusted per tolerance  Head of Bed (HOB) Positioning: HOB at 20 degrees     Problem: Gas Exchange Impaired  Goal: Optimal Gas Exchange  Outcome: " Progressing  Intervention: Optimize Oxygenation and Ventilation  Recent Flowsheet Documentation  Taken 4/11/2024 2120 by Frank Darby RN  Head of Bed (HOB) Positioning: HOB at 20 degrees     Problem: Fall Injury Risk  Goal: Absence of Fall and Fall-Related Injury  Outcome: Progressing  Intervention: Identify and Manage Contributors  Recent Flowsheet Documentation  Taken 4/11/2024 2120 by Frank Darby RN  Medication Review/Management: medications reviewed  Intervention: Promote Injury-Free Environment  Recent Flowsheet Documentation  Taken 4/11/2024 2120 by Frank Darby RN  Safety Promotion/Fall Prevention:   treat underlying cause   treat reversible contributory factors   safety round/check completed   room near nurse's station   room organization consistent   Goal Outcome Evaluation:      Plan of Care Reviewed With: patient    Overall Patient Progress: improvingOverall Patient Progress: improving    Outcome Evaluation: 1 L NC. Stating well. D/C today.

## 2024-04-12 NOTE — PLAN OF CARE
Patient discharged to TCU with daughter providing transportation.   Patient verbalized and received copy of discharge instructions.  Daughter received discharge packet to hand over to personnel at the TCU.  Personal belongings gathered and sent with patient.  VSS. IV removed prior to discharge.

## 2024-04-12 NOTE — PROGRESS NOTES
Care Management Discharge Note    Discharge Date: 04/12/2024       Discharge Disposition: Transitional Care    Discharge Services:      Discharge DME: None    Discharge Transportation:      Private pay costs discussed: Not applicable    Does the patient's insurance plan have a 3 day qualifying hospital stay waiver?  No    PAS Confirmation Code: [id number]  Patient/family educated on Medicare website which has current facility and service quality ratings: yes    Education Provided on the Discharge Plan:    Persons Notified of Discharge Plans: MD, facility, daughter  Patient/Family in Agreement with the Plan:      Handoff Referral Completed: No    Additional Information:    Pt is set for discharge to [facility] today 4/12 via daughter transport around 1300. Spoke with [facility] who is aware and agreeable. PAS completed. Orders faxed. VM left for daughter to confirm ride time for today.     Charity Soler/WHIT Lentz, INOCENCIO  Inpatient Care Coordination  Emergency Room /Charity Shin LGSW

## 2024-04-12 NOTE — DISCHARGE SUMMARY
[facility]  Hospitalist Discharge Summary      Date of Admission:  4/9/2024  Date of Discharge:  4/12/2024  1:10 PM  Discharging Provider: Susannah Fischer DO  Discharge Service: Hospitalist Service    Discharge Diagnoses   Shortness of breath with acute hypoxemic respiratory failure  Acute fractures of R lateral 8th and 9th ribs  Mildly comminuted fracture of manubrium  Generalized weakness with more frequent falling  NSTEMI-suspect demand  Acute renal insufficiency  Hyponatremia  Right shoulder pain  Chest wall ecchymosis  Lower extremity wounds  Hypertension    Clinically Significant Risk Factors          Follow-ups Needed After Discharge   Follow-up Appointments     Follow Up and recommended labs and tests      Follow up with halfway physician.  The following labs/tests are   recommended: BMP.            Discharge Disposition   Discharged to TCU  Condition at discharge: Stable    Hospital Course   Dayana Samnao is a 89 year old female with history of CVA and hypertension who lives independently and ambulates with a walker admitted on 4/9/2024 with increased generalized weakness, 3 falls in the last couple days, lack of appetite and shortness of breath. FTH acute rib and sternal fractures.    Shortness of breath with acute hypoxemic respiratory failure  Acute fractures of R lateral 8th and 9th ribs  Mildly comminuted fracture of manubrium  -Patient presents with shortness of breath that is developed over the last 24 to 36 hours without significant cough, chest discomfort or evidence of fluid overload  -She has fallen recently and does have an ecchymotic region on her chest wall but she states this is not painful and she is able to take deep breaths  -She and family report that she has just not been feeling well recently with poor intake and generalized weakness.  She has been on multiple different antibiotics for lower extremity wounds recently  -Chest x-ray does not show obvious  pneumonia, CT showed above fractures.  -Cardiac etiology unlikely. BNP is normal, troponin mildly elevated which will be discussed below. TTE with normal EF (did show moderately severe mitral regurg).  -low suspicion for infection. UA negative. Blood cultures NGTD.  -Per patient's family she was evaluated by wound clinic today with significant improvement of her lower extremity wounds and ended her antibiotic treatment with Bactrim on 4/8  -Suspect limited inspiration 2/2 rib and sternum fracture -> hypoxia. Weaned to room air on discharge.   -Scheduled tylenol  -Incentive spirometry    Generalized weakness with more frequent falling  -She reports 3 falls in the last couple days all of them related to weakness when she is trying to stand and unable to.  She has fallen onto her arm and has a significant bruise on her chest wall.  No fractures seen on right humerus x-ray or chest x-ray  -Clinically she does appear dehydrated and lab work would support this.  It sounds like she has not been eating well recently which I suspect is related to multiple different antibiotics for her lower leg wounds  -Infectious workup as above  -PT and social work consults - TCU discharge   -Nutrition consult    NSTEMI-suspect demand  -Troponin mildly elevated at 41 without complaint of chest pain but she is having new shortness of breath without history of coronary disease  -Repeat troponin stable  -continue PTA full dose Aspirin    Acute renal insufficiency  Hyponatremia  -Baseline creatinine of 0.8-0.9 with admission creatinine of 1.19 suspect due to poor oral intake and possibly recent Bactrim use  -Repeat Cr back to baseline  -Sodium low at 133, repeat 134  -Avoid nephrotoxins    Right shoulder pain  -Patient does have increased right shoulder pain after her fall.  She has the most pain when lifting her right arm in the shoulder joint.  I am able to passively move the arm without any pain.  Suspect underlying rotator cuff injury.  -R  shoulder XR with no acute fracture  -Tylenol and icing for pain    Chest wall ecchymosis  -Does not seem to bother her, rib fracture management as above    Lower extremity wounds  -Follows with wound clinic and she reports that she had an appointment with them today with improvement of her wounds and just ended her third course of antibiotics on 4/8  -Per her daughter she would like to wrap them daily so did not do wound care nurse consult.     Hypertension  -resume daily Lasix and Lisinopril    Consultations This Hospital Stay   PHYSICAL THERAPY ADULT IP CONSULT  CARE MANAGEMENT / SOCIAL WORK IP CONSULT  SURGERY GENERAL IP CONSULT  NUTRITION SERVICES ADULT IP CONSULT  PHYSICAL THERAPY ADULT IP CONSULT  OCCUPATIONAL THERAPY ADULT IP CONSULT    Code Status   Prior    Time Spent on this Encounter   ISusannah DO, personally saw the patient today and spent greater than 30 minutes discharging this patient.       Susannah Fischer DO  [facility] MEDICAL SURGICAL  [address] [place] [address]  Phone: [phone]  Fax: [phone]  ______________________________________________________________________    Physical Exam   Vital Signs: Temp: 97.9  F (36.6  C) Temp src: Oral BP: (!) 181/80 Pulse: 72   Resp: 18 SpO2: 92 % O2 Device: None (Room air)    Weight: 113 lbs 12.8 oz  Constitutional: Awake, alert, no distress, and cooperative  Cardiovascular: Regular rate and rhythm, normal S1 and S2, no S3 or S4, and no murmur noted  Respiratory: No increased work of breathing, good air exchange, clear to auscultation bilaterally, no crackles or wheezing  Gastrointestinal: Abdomen soft, non-tender, non-distended. BS normal. No masses, organomegaly        Primary Care Physician   Elisha Mcdaniel    Discharge Orders      General info for SNF    Length of Stay Estimate: Short Term Care: Estimated # of Days <30  Condition at Discharge: Stable  Level of care:skilled   Rehabilitation  Potential: Fair  Admission H&P remains valid and up-to-date: Yes  Recent Chemotherapy: N/A  Use Nursing Home Standing Orders: Yes     Mantoux instructions    Give two-step Mantoux (PPD) Per Facility Policy Yes     Follow Up and recommended labs and tests    Follow up with residential physician.  The following labs/tests are recommended: BMP.     Reason for your hospital stay    You were in the hospital for weakness, falls, and shortness of breath. You have 2 rib fractures and a sternal fracture that likely led to your shortness of breath. The weakness may be related to dehydration and poor nutrition. Protein drinks can help with this.     Activity - Up with nursing assistance     Physical Therapy Adult Consult    Evaluate and treat as clinically indicated.    Reason:  deconditioning     Occupational Therapy Adult Consult    Evaluate and treat as clinically indicated.    Reason:  deconditioning     Diet    Follow this diet upon discharge:       Snacks/Supplements Adult: Ensure Enlive; With Meals      Combination Diet Regular Diet Adult       Significant Results and Procedures   Results for orders placed or performed during the hospital encounter of 04/09/24   XR Chest 2 Views    Narrative    EXAM: XR CHEST 2 VIEWS  LOCATION: [facility]  DATE: 4/9/2024    INDICATION: dyspnea  COMPARISON: None.      Impression    IMPRESSION: Cardiomegaly. Pulmonary vascularity normal. Prominent costochondral calcifications bilaterally. Asymmetric density right upper lobe and mediastinum, indeterminate. Mass not excluded. There is also a circular nodule versus artifact measuring   1.7 cm mid left lung. CT recommended for further evaluation. Old left rib fractures. Advanced degenerative changes right shoulder.   CT Head w/o Contrast    Narrative    EXAM: CT HEAD W/O CONTRAST, CT CERVICAL SPINE W/O CONTRAST  LOCATION: [facility]  DATE: 4/9/2024    INDICATION: Trauma, fall,  confusion  COMPARISON: None.  TECHNIQUE:   1) Routine CT Head without IV contrast. Multiplanar reformats. Dose reduction techniques were used.  2) Routine CT Cervical Spine without IV contrast. Multiplanar reformats. Dose reduction techniques were used.    FINDINGS:   HEAD CT:   INTRACRANIAL CONTENTS: Incidental small calcified left anterior parafalcine meningioma. No intracranial hemorrhage, extraaxial collection, or mass effect.   Age-indeterminate, however likely chronic left corona radiata and corpus striatal infarcts. Small   chronic left precentral gyrus infarct at the left hand motor knob. Severe presumed chronic small vessel ischemic changes. Moderate generalized volume loss. No hydrocephalus.     VISUALIZED ORBITS/SINUSES/MASTOIDS: No intraorbital abnormality. No significant paranasal sinus mucosal disease. No middle ear or mastoid effusion.    BONES/SOFT TISSUES: No acute abnormality.    CERVICAL SPINE CT:   VERTEBRA: Exaggeration of the usual cervical lordosis. Minimal stepwise anterolisthesis from C2-C6. Chronic vertebral body height loss at T1-T2 with ankylosis. Ankylosis of the C2-C4 lateral masses. No acute compression fracture or traumatic subluxation.    CANAL/FORAMINA: Multilevel spondylosis without high grade canal stenosis.    PARASPINAL: No prevertebral edema. Chronic right second posterior rib fracture deformity.      Impression    IMPRESSION:  HEAD CT:  1.  Age-indeterminate, however likely chronic left corona radiata and corpus striatal infarcts.  2.  No acute intracranial hemorrhage.    CERVICAL SPINE CT:  1.  No acute cervical spine fracture.   CT Cervical Spine w/o Contrast    Narrative    EXAM: CT HEAD W/O CONTRAST, CT CERVICAL SPINE W/O CONTRAST  LOCATION: [facility]  DATE: 4/9/2024    INDICATION: Trauma, fall, confusion  COMPARISON: None.  TECHNIQUE:   1) Routine CT Head without IV contrast. Multiplanar reformats. Dose reduction techniques were used.  2) Routine  CT Cervical Spine without IV contrast. Multiplanar reformats. Dose reduction techniques were used.    FINDINGS:   HEAD CT:   INTRACRANIAL CONTENTS: Incidental small calcified left anterior parafalcine meningioma. No intracranial hemorrhage, extraaxial collection, or mass effect.   Age-indeterminate, however likely chronic left corona radiata and corpus striatal infarcts. Small   chronic left precentral gyrus infarct at the left hand motor knob. Severe presumed chronic small vessel ischemic changes. Moderate generalized volume loss. No hydrocephalus.     VISUALIZED ORBITS/SINUSES/MASTOIDS: No intraorbital abnormality. No significant paranasal sinus mucosal disease. No middle ear or mastoid effusion.    BONES/SOFT TISSUES: No acute abnormality.    CERVICAL SPINE CT:   VERTEBRA: Exaggeration of the usual cervical lordosis. Minimal stepwise anterolisthesis from C2-C6. Chronic vertebral body height loss at T1-T2 with ankylosis. Ankylosis of the C2-C4 lateral masses. No acute compression fracture or traumatic subluxation.    CANAL/FORAMINA: Multilevel spondylosis without high grade canal stenosis.    PARASPINAL: No prevertebral edema. Chronic right second posterior rib fracture deformity.      Impression    IMPRESSION:  HEAD CT:  1.  Age-indeterminate, however likely chronic left corona radiata and corpus striatal infarcts.  2.  No acute intracranial hemorrhage.    CERVICAL SPINE CT:  1.  No acute cervical spine fracture.   Humerus XR, G/E 2 views, right    Narrative    EXAM: XR HUMERUS RIGHT G/E 2 VIEWS  LOCATION: [facility]  DATE: 4/9/2024    INDICATION: Right arm pain after fall.  COMPARISON: None.      Impression    IMPRESSION: No acute displaced right humerus fracture is identified. Chronic advanced arthropathy at the glenohumeral joint with chronic remodeling of the coracoid process. Axillary view of the right shoulder suggested if there is concern for   glenohumeral joint malalignment.  Arthrosis at the AC joint with lateral downsloping of the acromion and diffuse bone demineralization. No appreciable elbow joint effusion. Visualized right lung is grossly clear.   Chest CT w IV contrast only, TRAUMA / DISSECTION    Narrative    EXAM: CT CHEST W CONTRAST  LOCATION: [facility]  DATE: 4/9/2024    INDICATION: abnormal mediastinum on CXR, falls, dsypnea  COMPARISON: None.  TECHNIQUE: CT chest with IV contrast. Multiplanar reformats were obtained. Dose reduction techniques were used.    CONTRAST: 58mL Isovue 370    FINDINGS:   LUNGS AND PLEURA: A heterogeneous attenuation, rounded nodule in the left upper lobe adjacent to the lateral major fissure and contacting the lateral costal pleura measures 1.8 cm and is consistent with a benign hamartoma. There is a benign triangular   calcification in the basilar left lower lobe. No actionable lung nodules. Pleural parenchymal scarring with calcification in the apices. No airspace opacities. Bands of atelectasis are present in the lower lobes along the mediastinal and posterior costal   pleura. Central airways are patent and normal caliber. No pleural effusion.    MEDIASTINUM: Cardiac chambers are normal in size. Mitral annular calcifications. No pericardial effusion. Nonaneurysmal thoracic aorta. The thyroid gland is normal. No enlarged mediastinal or hilar lymph nodes. The esophagus is decompressed. No   mediastinal mass.    CORONARY ARTERY CALCIFICATION: Severe.    UPPER ABDOMEN: Benign calcified granuloma in the spleen. There are several benign cysts in the left lobe of the liver one of which has peripheral calcification.     MUSCULOSKELETAL: There is a mildly comminuted fractures of the manubrium. Additional acute fractures of the right lateral eighth and ninth ribs. There are multiple healed fracture deformities of left lateral ribs. Fat atrophy of both rotator cuff muscles   right worse than left. Right worse than left glenohumeral  osteoarthrosis.      Impression    IMPRESSION:     1.  Acute fractures of the right lateral eighth and ninth ribs and mildly comminuted fracture of the manubrium.  2.  Severe atheromatous coronary calcifications.  3.  Solid nodule in the lingula with attenuation consistent with a benign hamartoma. No acute lung, airway, or pleural inflammatory process.   XR Shoulder Right G/E 3 Views    Narrative    SHOULDER RIGHT THREE OR MORE VIEWS   4/10/2024 4:20 PM     HISTORY: Fall, pain, limited ROM.    COMPARISON: None.      Impression    IMPRESSION: Severe osteoarthrosis of the glenohumeral joint with  remodeling of the subchondral bone plate of the humeral head and  glenoid from long-standing bone on bone contact. There is also chronic  medial and cephalad positioning of the glenoid and humeral head, in  close proximity to the undersurface of the distal clavicle. Diffuse  bone demineralization. No evidence of an acute fracture.    KOSTA JARRETT MD         SYSTEM ID:  [id number]   XR Chest Port 1 View    Narrative    CHEST ONE VIEW April 11, 2024 7:06 AM     HISTORY: Trauma, patient with rib fracture(s).    COMPARISON: April 9, 2024.      Impression    IMPRESSION: No pneumothorax. No acute infiltrates.    ROBBI CEBALLOS MD         SYSTEM ID:  [id number]   XR Chest Port 1 View    Narrative    EXAM: XR CHEST PORT 1 VIEW  LOCATION: [facility]  DATE: 4/12/2024    INDICATION: Trauma Patient with Rib Fracture(s)  COMPARISON: Two-view chest radiograph and chest CT 04/09/2024      Impression    IMPRESSION: Known right rib fractures. No pneumothorax. No new focal pulmonary consolidation or pleural effusion. Known benign left lingular hamartoma. Multiple old left rib fractures reidentified. Upper limits normal heart size. No pulmonary vascular   congestion. Coronary artery atherosclerotic calcifications. Atherosclerotic calcifications of the aortic arch.   Echocardiogram Complete     Value    LVEF  60-65%     [facility]    [id number]  [id number]  [id number]  [id number]^KETAN^CONY^JENISE     [facility]  Echocardiography Laboratory  [address]  [place] [address]     Name: CARRI KOENIG  MRN: [id number]  : 1924  Study Date: 04/10/2024 10:52 AM  Age: 89 yrs  Gender: Female  Patient Location: [facility]  Reason For Study: [facility] [place]  Ordering Physician: CONY ACEVES  Referring Physician: Delmer April  Performed By: Bradley Velázquez RDCS     BSA: 1.5 m2  Height: 60 in  Weight: 115 lb  HR: 65  BP: 132/64 mmHg  ______________________________________________________________________________  Procedure  Complete Echo Adult.  ______________________________________________________________________________  Interpretation Summary     Left ventricular systolic function is normal.  The visual ejection fraction is 60-65%.  No regional wall motion abnormalities noted.  There is moderately severe (3+) mitral regurgitation.  Eccentric MR jet along the interatrial septum.  Flow reversal noted in pulmonary veins consistent with significant mitral  regurgitation.  The study was technically difficult. There is no comparison study available.  ______________________________________________________________________________  Left Ventricle  The left ventricle is normal in size. There is normal left ventricular wall  thickness. Left ventricular systolic function is normal. The visual ejection  fraction is 60-65%. Left ventricular diastolic function is indeterminate. No  regional wall motion abnormalities noted.     Right Ventricle  The right ventricle is normal size. The right ventricular systolic function is  normal.     Atria  Normal left atrial size. Right atrial size is normal.     Mitral Valve  There is mild mitral annular calcification. There is moderately severe (3+)  mitral regurgitation. Eccentric MR jet along the interatrial septum. Flow  reversal noted in pulmonary veins consistent with significant  mitral  regurgitation.     Tricuspid Valve  There is mild (1+) tricuspid regurgitation. The right ventricular systolic  pressure is approximated at 29.5 mmHg plus the right atrial pressure.     Aortic Valve  There is mild trileaflet aortic sclerosis. There is mild (1+) aortic  regurgitation. No aortic stenosis is present.     Pulmonic Valve  The pulmonic valve is not well visualized.     Vessels  The aortic root is normal size.     Pericardium  There is no pericardial effusion.     Rhythm  Sinus rhythm was noted.  ______________________________________________________________________________  MMode/2D Measurements & Calculations  IVSd: 0.86 cm     LVIDd: 4.4 cm  LVIDs: 3.1 cm  LVPWd: 0.86 cm  IVC diam: 0.81 cm  FS: 27.8 %  LV mass(C)d: 117.7 grams  LV mass(C)dI: 79.8 grams/m2  Ao root diam: 3.2 cm  asc Aorta Diam: 3.5 cm  LVOT diam: 1.9 cm  LVOT area: 2.8 cm2  Ao root diam index Ht(cm/m): 2.1  Ao root diam index BSA (cm/m2): 2.2  Asc Ao diam index BSA (cm/m2): 2.4  Asc Ao diam index Ht(cm/m): 2.3  LA Volume (BP): 30.7 ml     LA Volume Index (BP): 20.7 ml/m2  RWT: 0.39  TAPSE: 1.9 cm     Time Measurements  Aortic HR: 64.0 BPM     Doppler Measurements & Calculations  MV E max jordin: 104.0 cm/sec  MV A max jordin: 125.0 cm/sec  MV E/A: 0.83  MV max P.5 mmHg  MV mean PG: 3.4 mmHg  MV V2 VTI: 46.6 cm  MVA(VTI): 1.9 cm2  MV dec time: 0.27 sec  AI P1/2t: 630.2 msec  LV V1 max P.5 mmHg  LV V1 max: 137.0 cm/sec  LV V1 VTI: 30.7 cm  MR PISA: 3.7 cm2  MR ERO: 0.32 cm2  MR volume: 17.4 ml  CO(LVOT): 5.6 l/min  CI(LVOT): 3.8 l/min/m2  SV(LVOT): 86.8 ml  SI(LVOT): 58.8 ml/m2     PA acc time: 0.10 sec  TR max jordin: 271.6 cm/sec  TR max P.5 mmHg  E/E' av.8  Lateral E/e': 13.7  Medial E/e': 11.9  RV S Jordin: 17.6 cm/sec     ______________________________________________________________________________  Report approved by: Fransisco Crenshaw 04/10/2024 11:51 AM             Discharge Medications   Discharge Medication List as  of 4/12/2024 12:59 PM        CONTINUE these medications which have CHANGED    Details   acetaminophen (TYLENOL) 325 MG tablet Take 3 tablets (975 mg) by mouth every 8 hours, Transitional           CONTINUE these medications which have NOT CHANGED    Details   aspirin (ASA) 325 MG tablet Take 325 mg by mouth daily, Historical      CALCIUM 600 + D 600-200 MG-UNIT OR TABS Take 1 tablet by mouth daily , Disp-3 MONTHS,R-1 YEAR, Historical      furosemide (LASIX) 40 MG tablet Take 1 tablet (40 mg) by mouth daily, Disp-90 tablet, R-3, E-Prescribe      ketoconazole (NIZORAL) 2 % external shampoo Historical      latanoprost (XALATAN) 0.005 % ophthalmic solution Place 1 drop into both eyes at bedtime, Historical      lisinopril (ZESTRIL) 40 MG tablet Take 1 tablet (40 mg) by mouth daily, Disp-90 tablet, R-3, E-Prescribe      metoprolol tartrate (LOPRESSOR) 100 MG tablet Take 1 tablet (100 mg) by mouth 2 times daily, Disp-180 tablet, R-3, E-Prescribe      multivitamin (OCUVITE) TABS tablet Take 1 tablet by mouth daily, Historical      polyethylene glycol 400 (BLINK TEARS) 0.25 % SOLN ophthalmic solution Place 1 drop into both eyes 2 times daily as needed for dry eyes, Historical      timolol maleate (TIMOPTIC) 0.5 % ophthalmic solution Place 1 drop into both eyes every morning, Historical           STOP taking these medications       amoxicillin (AMOXIL) 500 MG capsule Comments:   Reason for Stopping:         potassium chloride ER (KLOR-CON M) 20 MEQ CR tablet Comments:   Reason for Stopping:             Allergies   Allergies   Allergen Reactions    Meperidine Nausea and Vomiting    Morphine Nausea and Vomiting    Hydromorphone Nausea and Vomiting    No Known Allergies

## 2024-04-12 NOTE — PLAN OF CARE
"Goal Outcome Evaluation:     Overall Patient Progress: improvingOverall Patient Progress: improving    Outcome Evaluation: Denied pain throughout the shift.  Ambulated throughout halls.  LS clear & diminished.      Problem: Adult Inpatient Plan of Care  Goal: Plan of Care Review  Description: The Plan of Care Review/Shift note should be completed every shift.  The Outcome Evaluation is a brief statement about your assessment that the patient is improving, declining, or no change.  This information will be displayed automatically on your shift  note.  Outcome: Progressing  Flowsheets (Taken 4/11/2024 2012)  Outcome Evaluation: Denied pain throughout the shift.  Ambulated throughout halls.  LS clear & diminished.  Overall Patient Progress: improving  Goal: Patient-Specific Goal (Individualized)  Description: You can add care plan individualizations to a care plan. Examples of Individualization might be:  \"Parent requests to be called daily at 9am for status\", \"I have a hard time hearing out of my right ear\", or \"Do not touch me to wake me up as it startles  me\".  Outcome: Progressing  Goal: Absence of Hospital-Acquired Illness or Injury  Outcome: Progressing  Intervention: Identify and Manage Fall Risk  Recent Flowsheet Documentation  Taken 4/11/2024 0815 by Alex Morales RN  Safety Promotion/Fall Prevention:   safety round/check completed   supervised activity  Goal: Optimal Comfort and Wellbeing  Outcome: Progressing  Goal: Readiness for Transition of Care  Outcome: Progressing     Problem: Skin Injury Risk Increased  Goal: Skin Health and Integrity  Outcome: Progressing  Intervention: Plan: Nurse Driven Intervention: Moisture Management  Recent Flowsheet Documentation  Taken 4/11/2024 0815 by Alex Morales RN  Moisture Interventions: Encourage regular toileting  Intervention: Optimize Skin Protection  Recent Flowsheet Documentation  Taken 4/11/2024 0815 by Alex Morales, RN  Activity Management: " activity adjusted per tolerance     Problem: Gas Exchange Impaired  Goal: Optimal Gas Exchange  Outcome: Progressing     Problem: Fall Injury Risk  Goal: Absence of Fall and Fall-Related Injury  Outcome: Progressing  Intervention: Promote Injury-Free Environment  Recent Flowsheet Documentation  Taken 4/11/2024 0815 by Alex Morales, RN  Safety Promotion/Fall Prevention:   safety round/check completed   supervised activity    Problem: Pain Acute  Goal: Optimal Pain Control and Function  Outcome: Adequate for Care Transition

## 2024-04-15 ENCOUNTER — TRANSITIONAL CARE UNIT VISIT (OUTPATIENT)
Dept: GERIATRICS | Facility: CLINIC | Age: 89
End: 2024-04-15
Payer: MEDICARE

## 2024-04-15 VITALS
BODY MASS INDEX: 22.15 KG/M2 | DIASTOLIC BLOOD PRESSURE: 60 MMHG | SYSTOLIC BLOOD PRESSURE: 138 MMHG | WEIGHT: 112.8 LBS | HEIGHT: 60 IN | RESPIRATION RATE: 18 BRPM | HEART RATE: 70 BPM | TEMPERATURE: 97.9 F | OXYGEN SATURATION: 93 %

## 2024-04-15 DIAGNOSIS — I50.32 CHRONIC DIASTOLIC CONGESTIVE HEART FAILURE (H): ICD-10-CM

## 2024-04-15 DIAGNOSIS — N18.31 STAGE 3A CHRONIC KIDNEY DISEASE (H): ICD-10-CM

## 2024-04-15 DIAGNOSIS — N17.9 ACUTE RENAL FAILURE, UNSPECIFIED ACUTE RENAL FAILURE TYPE (H): ICD-10-CM

## 2024-04-15 DIAGNOSIS — S22.41XD MULTIPLE FRACTURES OF RIBS, RIGHT SIDE, SUBSEQUENT ENCOUNTER FOR FRACTURE WITH ROUTINE HEALING: Primary | ICD-10-CM

## 2024-04-15 DIAGNOSIS — R29.6 REPEATED FALLS: ICD-10-CM

## 2024-04-15 DIAGNOSIS — I21.4 NSTEMI (NON-ST ELEVATED MYOCARDIAL INFARCTION) (H): ICD-10-CM

## 2024-04-15 DIAGNOSIS — E87.1 HYPO-OSMOLALITY AND HYPONATREMIA: ICD-10-CM

## 2024-04-15 DIAGNOSIS — S81.802D UNSPECIFIED OPEN WOUND, LEFT LOWER LEG, SUBSEQUENT ENCOUNTER: ICD-10-CM

## 2024-04-15 DIAGNOSIS — R53.81 PHYSICAL DECONDITIONING: ICD-10-CM

## 2024-04-15 DIAGNOSIS — I10 ESSENTIAL (PRIMARY) HYPERTENSION: ICD-10-CM

## 2024-04-15 DIAGNOSIS — S81.801D UNSPECIFIED OPEN WOUND, RIGHT LOWER LEG, SUBSEQUENT ENCOUNTER: ICD-10-CM

## 2024-04-15 DIAGNOSIS — S22.21XD FRACTURE OF MANUBRIUM, SUBSEQUENT ENCOUNTER FOR FRACTURE WITH ROUTINE HEALING: ICD-10-CM

## 2024-04-15 DIAGNOSIS — D69.6 THROMBOCYTOPENIA (H): ICD-10-CM

## 2024-04-15 LAB
BACTERIA BLD CULT: NO GROWTH
BACTERIA BLD CULT: NO GROWTH

## 2024-04-15 PROCEDURE — 99310 SBSQ NF CARE HIGH MDM 45: CPT | Performed by: NURSE PRACTITIONER

## 2024-04-15 RX ORDER — ACETAMINOPHEN 325 MG/1
650 TABLET ORAL 4 TIMES DAILY
COMMUNITY
Start: 2024-04-15 | End: 2024-05-03

## 2024-04-15 NOTE — PROGRESS NOTES
[place] [facility]  PRIMARY CARE PROVIDER AND CLINIC:  Elisha Mcdaniel MD, [address] [facility] / [facility] [id number]  Chief Complaint   Patient presents with    Hospital F/U     [place] Medical Record Number:  [id number]  Place of Service where encounter took place:  [facility] ([facility]) [[id number]]    Dayana Samano  is a 89 year old  (8/30/1924), admitted to the above facility from  [facility]. Hospital stay 4/9/24 through 4/12/24..  Admitted to this facility for  rehab, medical management, and nursing care.     Multiple fractures of ribs, right side, subsequent encounter for fracture with routine healing  Fracture of manubrium, subsequent encounter for fracture with routine healing  Repeated falls  Physical deconditioning  Essential (primary) hypertension  Chronic diastolic congestive heart failure (H)  NSTEMI (non-ST elevated myocardial infarction) (H)  Acute renal failure, unspecified acute renal failure type (H24)  Stage 3a chronic kidney disease (H)  Hypo-osmolality and hyponatremia  Unspecified open wound, left lower leg, subsequent encounter  Unspecified open wound, right lower leg, subsequent encounter  Thrombocytopenia (H24)    HPI:    HPI information obtained from: facility chart records, facility staff, patient report, [facility] chart review, and Care Everywhere [facility] chart review.   Brief Summary of Hospital Course: pt came in  3 recent falls, feeling sob-- workup negative for pneumonia or other acute resp concerns and felt to be from fractured ribs and upper sternal fracture.   UA  (-) also. She had a sore shoulder also from the falls.  Trop up--?NSTEMI, cont with PTA ASA.  Her  Na low, 133.  Also NERY--possibly dehydrated or due to recent bactrim that she took for the bilat leg wound. She is being treated at wound clinic for chronic leg wounds on shins. Sent for rehab    TODAY DURING EXAM/ROS:  No CP, SOB, Cough, dizziness, fevers,  chills, HA, N/V, dysuria or Bowel Abnormalities. Appetite is down a bit. Pain except legs, right side, sternum, upper right chest      CODE STATUS/ADVANCE DIRECTIVES DISCUSSION:   DNR / DNI  Patient's living condition: lives alone  ALLERGIES: Meperidine, Morphine, Hydromorphone, and No known allergies  PAST MEDICAL HISTORY:  has a past medical history of Aftercare following surgery of the musculoskeletal system (5/30/2017), Closed fracture of multiple ribs of left side with routine healing (11/24/2016), Closed fracture of ramus of left pubis (H) (9/21/2017), Closed fracture of right femur (H) (6/26/2017), Essential hypertension, benign, Hearing loss, History of CVA (cerebrovascular accident), History of endocarditis, Osteoporosis, unspecified, and Personal history of colonic polyps.  PAST SURGICAL HISTORY:   has a past surgical history that includes Femur Surgery (Right, 05/16/2017); Laparoscopic appendectomy (N/A, 5/8/2020); and Orif Elbow Fracture (Left, 01/01/2015).  FAMILY HISTORY: family history includes Cerebrovascular Disease in her father.  SOCIAL HISTORY:   reports that she quit smoking about 34 years ago. Her smoking use included cigarettes. She has been exposed to tobacco smoke. She has never used smokeless tobacco. She reports current alcohol use. She reports that she does not use drugs.    Post Discharge Medication Reconciliation Status: discharge medications reconciled and changed, per note/orders     Current Outpatient Medications   Medication Sig Dispense Refill    acetaminophen (TYLENOL) 325 MG tablet Take 650 mg by mouth 4 times daily  Between BF and HS  And Tylenol 650 mg daily prn pain      aspirin (ASA) 325 MG tablet Take 325 mg by mouth daily      CALCIUM 600 + D 600-200 MG-UNIT OR TABS Take 1 tablet by mouth daily  3 MONTHS 1 YEAR    furosemide (LASIX) 40 MG tablet Take 1 tablet (40 mg) by mouth daily 90 tablet 3    ketoconazole (NIZORAL) 2 % external shampoo daily as needed for itching or  irritation      latanoprost (XALATAN) 0.005 % ophthalmic solution Place 1 drop into both eyes at bedtime      lisinopril (ZESTRIL) 40 MG tablet Take 1 tablet (40 mg) by mouth daily 90 tablet 3    metoprolol tartrate (LOPRESSOR) 100 MG tablet Take 1 tablet (100 mg) by mouth 2 times daily 180 tablet 3    multivitamin (OCUVITE) TABS tablet Take 1 tablet by mouth daily      polyethylene glycol 400 (BLINK TEARS) 0.25 % SOLN ophthalmic solution Place 1 drop into both eyes 2 times daily as needed for dry eyes      timolol maleate (TIMOPTIC) 0.5 % ophthalmic solution Place 1 drop into both eyes every morning            Vitals:  /60   Pulse 70   Temp 97.9  F (36.6  C)   Resp 18   Ht 1.524 m (5')   Wt 51.2 kg (112 lb 12.8 oz)   SpO2 93%   BMI 22.03 kg/m    Exam:  GENERAL APPEARANCE:  Alert, in no distress, oriented, cooperative  ENT:  Mouth and posterior oropharynx normal, moist mucous membranes, [place]  EYES:  EOM, conjunctivae, lids, pupils and irises normal  RESP:  respiratory effort and palpation of chest normal, lungs clear to auscultation , no respiratory distress, diminished breath sounds mildly in bases  CV:  Palpation and auscultation of heart done ,IRRR, RRR. Soft systolic murmur. Mild LE edema, +2 pedal pulses  ABDOMEN:  normal bowel sounds, soft, nontender, no hepatosplenomegaly or other masses  M/S:   GREGORY equally , seen in bed. Slow to move  SKIN:  Inspection of skin and subcutaneous tissue baseline, except: bruises on right upper side, upper chest, sternum, left leg. See pics in PCC  NEURO:   Cranial nerves 2-12 are normal tested and grossly at patient's baseline  PSYCH:  oriented X 3, normal insight, judgement and memory, affect and mood normal    Lab/Diagnostic data:  Recent Labs   Lab Test 04/12/24  0636 04/11/24  0642    134*   POTASSIUM 4.6 4.5   CHLORIDE 100 101   CO2 24 25   ANIONGAP 11 8   * 99   BUN 20.7 19.1   CR 0.76 0.95   LEONARD 9.8* 9.5     CBC RESULTS:   Recent Labs   Lab Test  24  0636   WBC 3.4*   RBC 3.33*   HGB 11.3*   HCT 34.0*   *   MCH 33.9*   MCHC 33.2   RDW 12.2   *       ASSESSMENT / PLAN:  (S22.41XD) Multiple fractures of ribs, right side, subsequent encounter for fracture with routine healin&9  (primary encounter diagnosis)  (S22.21XD) Fracture of manubrium, subsequent encounter for fracture with routine healing  (R29.6) Repeated falls  (R53.81) Physical deconditioning  Comment: scheduled tylenol qid, she thinks that will be enough, wants to avoid narcs, PT OT, monitor. Check Vit D on     (I10) Essential (primary) hypertension  (I50.32) Chronic diastolic congestive heart failure (H)  (I21.4) NSTEMI (non-ST elevated myocardial infarction) (H)  Comment: cont metoprolol, Zestril, lasix, monitor Bps, HR.    (N17.9) Acute renal failure, unspecified acute renal failure type (H24)  (N18.31) Stage 3a chronic kidney disease (H)  (E87.1) Hypo-osmolality and hyponatremia  Comment:  check BMP on , encourage po    (S81.802D) Unspecified open wound, left lower leg, subsequent encounter  (S81.801D) Unspecified open wound, right lower leg, subsequent encounter  Comment: cont with wound cares per the wound clinic--she has upcoming appts with them also,  monitor.    (D69.6) Thrombocytopenia (H24)  Comment: chronic--steady, check prn as condition warrants.      Total time spent with patient visit at the skilled nursing facility was 50 min including patient visit, review of past records, and d/w daughter in room. Greater than 50% of total time spent with counseling and coordinating care..     Electronically signed by:  AMANDA Dean CNP

## 2024-04-17 ENCOUNTER — LAB REQUISITION (OUTPATIENT)
Dept: LAB | Facility: CLINIC | Age: 89
End: 2024-04-17
Payer: MEDICARE

## 2024-04-17 DIAGNOSIS — I10 ESSENTIAL (PRIMARY) HYPERTENSION: ICD-10-CM

## 2024-04-18 PROCEDURE — 82306 VITAMIN D 25 HYDROXY: CPT | Performed by: NURSE PRACTITIONER

## 2024-04-18 PROCEDURE — 36415 COLL VENOUS BLD VENIPUNCTURE: CPT | Performed by: NURSE PRACTITIONER

## 2024-04-18 PROCEDURE — P9604 ONE-WAY ALLOW PRORATED TRIP: HCPCS | Performed by: NURSE PRACTITIONER

## 2024-04-18 PROCEDURE — 80048 BASIC METABOLIC PNL TOTAL CA: CPT | Performed by: NURSE PRACTITIONER

## 2024-04-19 ENCOUNTER — TRANSITIONAL CARE UNIT VISIT (OUTPATIENT)
Dept: GERIATRICS | Facility: CLINIC | Age: 89
End: 2024-04-19
Payer: MEDICARE

## 2024-04-19 VITALS
OXYGEN SATURATION: 94 % | RESPIRATION RATE: 18 BRPM | TEMPERATURE: 97.4 F | SYSTOLIC BLOOD PRESSURE: 135 MMHG | HEIGHT: 60 IN | HEART RATE: 65 BPM | WEIGHT: 113 LBS | DIASTOLIC BLOOD PRESSURE: 65 MMHG | BODY MASS INDEX: 22.19 KG/M2

## 2024-04-19 DIAGNOSIS — S22.21XD FRACTURE OF MANUBRIUM, SUBSEQUENT ENCOUNTER FOR FRACTURE WITH ROUTINE HEALING: ICD-10-CM

## 2024-04-19 DIAGNOSIS — I10 ESSENTIAL (PRIMARY) HYPERTENSION: Primary | ICD-10-CM

## 2024-04-19 DIAGNOSIS — S22.41XD MULTIPLE FRACTURES OF RIBS, RIGHT SIDE, SUBSEQUENT ENCOUNTER FOR FRACTURE WITH ROUTINE HEALING: ICD-10-CM

## 2024-04-19 DIAGNOSIS — N18.31 STAGE 3A CHRONIC KIDNEY DISEASE (H): ICD-10-CM

## 2024-04-19 DIAGNOSIS — E55.9 VITAMIN D DEFICIENCY: ICD-10-CM

## 2024-04-19 LAB
ANION GAP SERPL CALCULATED.3IONS-SCNC: 15 MMOL/L (ref 7–15)
BUN SERPL-MCNC: 27.3 MG/DL (ref 8–23)
CALCIUM SERPL-MCNC: 10.7 MG/DL (ref 8.2–9.6)
CHLORIDE SERPL-SCNC: 96 MMOL/L (ref 98–107)
CREAT SERPL-MCNC: 0.95 MG/DL (ref 0.51–0.95)
DEPRECATED HCO3 PLAS-SCNC: 26 MMOL/L (ref 22–29)
EGFRCR SERPLBLD CKD-EPI 2021: 54 ML/MIN/1.73M2
GLUCOSE SERPL-MCNC: 87 MG/DL (ref 70–99)
POTASSIUM SERPL-SCNC: 4.4 MMOL/L (ref 3.4–5.3)
SODIUM SERPL-SCNC: 137 MMOL/L (ref 135–145)
VIT D+METAB SERPL-MCNC: 47 NG/ML (ref 20–50)

## 2024-04-19 PROCEDURE — 99309 SBSQ NF CARE MODERATE MDM 30: CPT | Performed by: NURSE PRACTITIONER

## 2024-04-19 NOTE — PROGRESS NOTES
CC: Lab Recheck     HPI:    Dayana Samano is a 89 year old  (1924), who is being seen today for an episodic care visit at [facility] ([facility]) . Today's concern: lab recheck of BMP, Vit D.          Essential (primary) hypertension  Stage 3a chronic kidney disease (H)    SUBJECTIVE/OBJECTIVE: No C/O's  . A & O x 3, NAD. In chair in room.    /65   Pulse 65   Temp 97.4  F (36.3  C)   Resp 18   Ht 1.524 m (5')   Wt 51.3 kg (113 lb)   SpO2 94%   BMI 22.07 kg/m      LABS:   Recent Labs   Lab Test 24  1052 24  0636    135   POTASSIUM 4.4 4.6   CHLORIDE 96* 100   CO2 26 24   ANIONGAP 15 11   GLC 87 104*   BUN 27.3* 20.7   CR 0.95 0.76   LEONARD 10.7* 9.8*   2024: Vit D=47    ASSESSMENT / PLAN:  (I10) Essential (primary) hypertension  (primary encounter diagnosis)  (N18.31) Stage 3a chronic kidney disease (H)  Comment: SBP runs mostly 130-140,  her Bun, GFR up a bit, review shows this has happened in past.  Will review meds, BP next visit, consider recheck as needed    (E55.9) Vitamin D deficiency  (S22.41XD) Multiple fractures of ribs, right side, subsequent encounter for fracture with routine healin&9  (S22.21XD) Fracture of manubrium, subsequent encounter for fracture with routine healing  Comment:  cont same POC, meds,   her Vit d level is fine--checked due to fractures.        Electronically Signed by:    Gena Ann RN, CNP

## 2024-04-22 ENCOUNTER — TRANSITIONAL CARE UNIT VISIT (OUTPATIENT)
Dept: GERIATRICS | Facility: CLINIC | Age: 89
End: 2024-04-22
Payer: MEDICARE

## 2024-04-22 VITALS
BODY MASS INDEX: 22.19 KG/M2 | SYSTOLIC BLOOD PRESSURE: 123 MMHG | DIASTOLIC BLOOD PRESSURE: 61 MMHG | OXYGEN SATURATION: 96 % | HEART RATE: 66 BPM | WEIGHT: 113 LBS | TEMPERATURE: 97.5 F | HEIGHT: 60 IN | RESPIRATION RATE: 18 BRPM

## 2024-04-22 DIAGNOSIS — S81.801D UNSPECIFIED OPEN WOUND, RIGHT LOWER LEG, SUBSEQUENT ENCOUNTER: ICD-10-CM

## 2024-04-22 DIAGNOSIS — S22.21XD FRACTURE OF MANUBRIUM, SUBSEQUENT ENCOUNTER FOR FRACTURE WITH ROUTINE HEALING: ICD-10-CM

## 2024-04-22 DIAGNOSIS — R53.81 PHYSICAL DECONDITIONING: ICD-10-CM

## 2024-04-22 DIAGNOSIS — I10 ESSENTIAL (PRIMARY) HYPERTENSION: ICD-10-CM

## 2024-04-22 DIAGNOSIS — I50.32 CHRONIC DIASTOLIC CONGESTIVE HEART FAILURE (H): ICD-10-CM

## 2024-04-22 DIAGNOSIS — S81.802D UNSPECIFIED OPEN WOUND, LEFT LOWER LEG, SUBSEQUENT ENCOUNTER: ICD-10-CM

## 2024-04-22 DIAGNOSIS — S22.41XD MULTIPLE FRACTURES OF RIBS, RIGHT SIDE, SUBSEQUENT ENCOUNTER FOR FRACTURE WITH ROUTINE HEALING: Primary | ICD-10-CM

## 2024-04-22 DIAGNOSIS — N18.31 STAGE 3A CHRONIC KIDNEY DISEASE (H): ICD-10-CM
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
## 2024-04-22 PROCEDURE — 99309 SBSQ NF CARE MODERATE MDM 30: CPT | Performed by: NURSE PRACTITIONER

## 2024-04-22 NOTE — PROGRESS NOTES
[facility] GERIATRIC SERVICES    Chief Complaint   Patient presents with    Nursing Home Acute       [facility] Medical Record Number:  [id number]  Place of Service where encounter took place:  [facility]) [[id number]]    HPI:    Dayana Samano is a 89 year old  (8/30/1924), who is being seen today for an episodic care visit.  HPI information obtained from: facility chart records, facility staff, patient report, and [facility] chart review.Today's concern is:     Multiple fractures of ribs, right side, subsequent encounter for fracture with routine healing  Fall, sequela  Fracture of manubrium, subsequent encounter for fracture with routine healing  Physical deconditioning  Stage 3a chronic kidney disease (H)  Essential (primary) hypertension  Chronic diastolic congestive heart failure (H)  Unspecified open wound, left lower leg, subsequent encounter  Unspecified open wound, right lower leg, subsequent encounter     ALLERGIES: Meperidine, Morphine, Hydromorphone, and No known allergies  Past Medical, Surgical, Family and Social History reviewed and updated in [facility].    Current Outpatient Medications   Medication Sig Dispense Refill    acetaminophen (TYLENOL) 325 MG tablet Take 650 mg by mouth 4 times daily  Between BF and HS  And Tylenol 650 mg daily prn pain      aspirin (ASA) 325 MG tablet Take 325 mg by mouth daily      CALCIUM 600 + D 600-200 MG-UNIT OR TABS Take 1 tablet by mouth daily  3 MONTHS 1 YEAR    furosemide (LASIX) 40 MG tablet Take 1 tablet (40 mg) by mouth daily 90 tablet 3    ketoconazole (NIZORAL) 2 % external shampoo daily as needed for itching or irritation      latanoprost (XALATAN) 0.005 % ophthalmic solution Place 1 drop into both eyes at bedtime      lisinopril (ZESTRIL) 40 MG tablet Take 1 tablet (40 mg) by mouth daily 90 tablet 3    metoprolol tartrate (LOPRESSOR) 100 MG tablet Take 1 tablet (100 mg) by mouth 2 times daily 180 tablet 3    multivitamin  (OCUVITE) TABS tablet Take 1 tablet by mouth daily      polyethylene glycol 400 (BLINK TEARS) 0.25 % SOLN ophthalmic solution Place 1 drop into both eyes 2 times daily as needed for dry eyes      timolol maleate (TIMOPTIC) 0.5 % ophthalmic solution Place 1 drop into both eyes every morning       Medications reviewed:  Medications reconciled to facility chart and changes were made to reflect current medications as identified as above med list. Below are the changes that were made:   Medications stopped since last EPIC medication reconciliation:   There are no discontinued medications.    Medications started since last [facility] medication reconciliation:  No orders of the defined types were placed in this encounter.     TODAY DURING EXAM/ROS:  No CP, SOB, Cough, dizziness, fevers, chills, HA, N/V, dysuria or Bowel Abnormalities. Appetite is good.  No pain except mild in chest, rib areas    Physical Exam:  /61   Pulse 66   Temp 97.5  F (36.4  C)   Resp 18   Ht 1.524 m (5')   Wt 51.3 kg (113 lb)   SpO2 96%   BMI 22.07 kg/m    GENERAL APPEARANCE:  Alert, in no distress, oriented, cooperative  ENT:  Mouth with moist mucous membranes, [place]  EYES:  Conjunctivae, lids, pupils and irises normal  RESP:  respiratory effort of chest normal, lungs CTA with a few scattered crackles. NAD  CV:  Auscultation of heart done ,IRRR, RRR. Soft systolic murmur. Mild bilat LE edema, +2 pedal pulses  ABDOMEN:  normal bowel sounds, soft, nontender.  M/S:   GREGORY equally , seen in chair.  SKIN:  Inspection of skin att baseline, except: bruises on right upper side, upper chest, sternum, left leg--these are fading. Also has 2 wounds on right Lower leg(PVD) and 1 on right shin from the falls--these seem to be improved, right one is drying up. See pics in PCC  NEURO:   Cranial nerves are grossly intact and  at patient's baseline  PSYCH:  oriented X 3, normal insight, judgement and memory, affect and mood normal     CBC RESULTS:   Recent Labs    Lab Test 24  0636 24  0642   WBC 3.4* 3.4*   RBC 3.33* 3.06*   HGB 11.3* 10.3*   HCT 34.0* 32.1*   * 105*   MCH 33.9* 33.7*   MCHC 33.2 32.1   RDW 12.2 12.4   * 99*       Last Basic Metabolic Panel:  Recent Labs   Lab Test 24  1052 24  0636    135   POTASSIUM 4.4 4.6   CHLORIDE 96* 100   LEONARD 10.7* 9.8*   CO2 26 24   BUN 27.3* 20.7   CR 0.95 0.76   GLC 87 104*     TSH   Date Value Ref Range Status   2023 0.93 0.30 - 4.20 uIU/mL Final   06/10/2021 0.60 0.40 - 4.00 mU/L Final     Lab Results   Component Value Date    A1C 5.4 2020     ASSESSMENT / PLAN:  (S22.41XD) Multiple fractures of ribs, right side, subsequent encounter for fracture with routine healin&9  (primary encounter diagnosis)  (S22.21XD) Fracture of manubrium, subsequent encounter for fracture with routine healing  (W19.XXXS) Fall, sequela  (R53.81) Physical deconditioning  Comment: minimal pains, cont current POC, PT OT, no LCD yet but progressing.  Vit D level was 47 on 2024    (N18.31) Stage 3a chronic kidney disease (H)  Comment: recheck on  as BUN  up a bit on .    (I10) Essential (primary) hypertension  (I50.32) Chronic diastolic congestive heart failure (H)  Comment/Plan: SBP runs 120-130's mostly, occas 140's, HR 60-70, cont with asa, lasix, metoprolol.  BMP in am    (S81.772D) Unspecified open wound, left lower leg, subsequent encounter  (S81.882D) Unspecified open wound, right lower leg, subsequent encounter  Comment: improving, has next und clinic appt upcoming  2024      Electronically signed by  AMANDA Dean CNP

## 2024-04-24 ENCOUNTER — LAB REQUISITION (OUTPATIENT)
Dept: LAB | Facility: CLINIC | Age: 89
End: 2024-04-24
Payer: MEDICARE

## 2024-04-24 VITALS
BODY MASS INDEX: 22.01 KG/M2 | OXYGEN SATURATION: 95 % | DIASTOLIC BLOOD PRESSURE: 74 MMHG | SYSTOLIC BLOOD PRESSURE: 154 MMHG | HEIGHT: 60 IN | HEART RATE: 65 BPM | WEIGHT: 112.1 LBS | RESPIRATION RATE: 16 BRPM | TEMPERATURE: 97.7 F

## 2024-04-24 DIAGNOSIS — N18.9 CHRONIC KIDNEY DISEASE, UNSPECIFIED: ICD-10-CM

## 2024-04-24 DIAGNOSIS — I10 ESSENTIAL (PRIMARY) HYPERTENSION: ICD-10-CM

## 2024-04-25 ENCOUNTER — TRANSFERRED RECORDS (OUTPATIENT)
Dept: HEALTH INFORMATION MANAGEMENT | Facility: CLINIC | Age: 89
End: 2024-04-25

## 2024-04-25 ENCOUNTER — TRANSITIONAL CARE UNIT VISIT (OUTPATIENT)
Dept: GERIATRICS | Facility: CLINIC | Age: 89
End: 2024-04-25
Payer: MEDICARE

## 2024-04-25 DIAGNOSIS — S22.41XD MULTIPLE FRACTURES OF RIBS, RIGHT SIDE, SUBSEQUENT ENCOUNTER FOR FRACTURE WITH ROUTINE HEALING: Primary | ICD-10-CM

## 2024-04-25 DIAGNOSIS — D69.6 THROMBOCYTOPENIA (H): ICD-10-CM

## 2024-04-25 DIAGNOSIS — S81.802D UNSPECIFIED OPEN WOUND, LEFT LOWER LEG, SUBSEQUENT ENCOUNTER: ICD-10-CM

## 2024-04-25 DIAGNOSIS — R53.81 PHYSICAL DECONDITIONING: ICD-10-CM
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
## 2024-04-25 DIAGNOSIS — S22.21XD FRACTURE OF MANUBRIUM, SUBSEQUENT ENCOUNTER FOR FRACTURE WITH ROUTINE HEALING: ICD-10-CM

## 2024-04-25 DIAGNOSIS — N18.31 STAGE 3A CHRONIC KIDNEY DISEASE (H): ICD-10-CM

## 2024-04-25 DIAGNOSIS — I10 ESSENTIAL (PRIMARY) HYPERTENSION: ICD-10-CM

## 2024-04-25 LAB
ANION GAP SERPL CALCULATED.3IONS-SCNC: 10 MMOL/L (ref 7–15)
BUN SERPL-MCNC: 28.2 MG/DL (ref 8–23)
CALCIUM SERPL-MCNC: 10.2 MG/DL (ref 8.2–9.6)
CHLORIDE SERPL-SCNC: 102 MMOL/L (ref 98–107)
CREAT SERPL-MCNC: 0.74 MG/DL (ref 0.51–0.95)
DEPRECATED HCO3 PLAS-SCNC: 27 MMOL/L (ref 22–29)
EGFRCR SERPLBLD CKD-EPI 2021: 72 ML/MIN/1.73M2
GLUCOSE SERPL-MCNC: 84 MG/DL (ref 70–99)
POTASSIUM SERPL-SCNC: 4.2 MMOL/L (ref 3.4–5.3)
SODIUM SERPL-SCNC: 139 MMOL/L (ref 135–145)

## 2024-04-25 PROCEDURE — 99305 1ST NF CARE MODERATE MDM 35: CPT | Performed by: INTERNAL MEDICINE

## 2024-04-25 PROCEDURE — P9604 ONE-WAY ALLOW PRORATED TRIP: HCPCS | Performed by: NURSE PRACTITIONER

## 2024-04-25 PROCEDURE — 36415 COLL VENOUS BLD VENIPUNCTURE: CPT | Performed by: NURSE PRACTITIONER

## 2024-04-25 PROCEDURE — 80048 BASIC METABOLIC PNL TOTAL CA: CPT | Performed by: NURSE PRACTITIONER

## 2024-04-25 NOTE — PROGRESS NOTES
"Dayana Samano is a 89 year old female seen April 25, 2024 at [facility] TCU where she was admitted after [facility] hospitalization 4/9-12 for right 8-9 rib fractures and manubrium fracture    Pt presented after 3 falls, with shortness of breath, weakness and loss of appetite.   Pt had a subacute decline prior, on several courses of abx for LE wounds and cellulitis.  Followed by Wound clinic, on wounds mostly healed by day of admission, but pt not eating well and had several falls.  Hospital workup for infection or cardiac cause was negative, given IVF for dehydration   Bruise noted on chest wall and pt reported right shoulder pain related to fall   Pt improved and discharged to TCU for ongoing recovery and Rehab.       Today pt is seen in her room lying abed     States \"I'm not getting any care here.\"   Has some other somewhat paranoid thoughts, then distracted by her lunch menu    Helped her fill that out, all she wanted was cheese and crackers, states that is what she has every day for lunch.    Not able to say much about her fall or hospitalization  Does not report pain while lying in bed.   Slow progress with therapies, fatigued but no shortness of breath or hypoxia.       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00     Types: Cigarettes     Quit date: 1990     Years since quittin.3     Passive exposure: Past    Smokeless tobacco: Never   Substance Use Topics    Alcohol use: Yes     Comment: glass of wine with dinner-sometimes      SH:  Lives in [facility] apartment in [place]    x2   Two daughters, one is an RN     Review Of Systems  Skin: bruising, traumatic wounds     Eyes: impaired vision  Ears/Nose/Throat: hearing loss  Respiratory: No shortness of breath, dyspnea on exertion, cough, or hemoptysis  Cardiovascular: exercise intolerance  Gastrointestinal: negative  Genitourinary: negative  Musculoskeletal: ambulatory with walker at baseline     Now min-mod assist with dressing, transfers with SBA   Ambulates 110-220' with 4WW and CGA     TUG 67s     EMS 11    Neurologic: SLUMS      Psychiatric: negative  Hematologic/Lymphatic/Immunologic: low plts   Endocrine: negative      GENERAL APPEARANCE: alert and no distress, looks younger than her stated age     BP (!) 154/74   Pulse 65   Temp 97.7  F (36.5  C)   Resp 16   Ht 1.524 m (5')   Wt 50.8 kg (112 lb 1.6 oz)   SpO2 95%   BMI 21.89 kg/m     HEENT: normocephalic, no lesion or abnormalities  NECK: no adenopathy, no asymmetry, masses, or scars and thyroid normal to palpation  RESP: lungs clear to auscultation - no rales, rhonchi or wheezes  CV: regular rate and rhythm, normal S1 S2, II-III HSM at left anterior axillary line      ABDOMEN:  soft, nontender, no HSM or masses and bowel sounds normal  MS: extremities without edema   SKIN: no suspicious lesions or rashes  NEURO: Normal  strength and tone, sensory exam grossly normal, and speech normal  PSYCH: affect okay, a little anxious  LYMPHATICS: No cervical,  or supraclavicular nodes     Lab Results   Component Value Date     2024    POTASSIUM 4.4 2024    CHLORIDE 96 (L) 2024    CO2 26 2024    ANIONGAP 15 2024    GLC 87 2024    BUN 27.3 (H) 2024    CR 0.95 2024    GFRESTIMATED 54 (L) 2024    LEONARD 10.7 (H) 2024     Lab Results   Component Value Date    AST 35 2024      ALBUMIN 4.3 2024      ALKPHOS 64 2024     Lab Results   Component Value Date    WBC 3.4 2024      HGB 11.3 2024       2024       2024     TSH   Date Value Ref Range Status   2023 0.93 0.30 - 4.20 uIU/mL Final   06/10/2021 0.60 0.40 - 4.00 mU/L Final      Vit D level 47       IMP/PLAN:   (S22.41XD) Multiple fractures of ribs, right side, subsequent encounter for fracture with routine healin&9  (primary encounter diagnosis)  (W19.XXXS) Fall, sequela  (S22.21XD) Fracture of manubrium, subsequent encounter for fracture with routine healing  (R53.81) Physical deconditioning  Comment: several falls due to weakness, poor safety awareness   Plan: PHYSICAL THERAPY / OCCUPATIONAL THERAPY for safe transfers, balance, gait, strengthening and ADLs.   Discharge goal is return to her [place] apartment with services   Pain management with acetaminophen 650 mg qid, local measures   Calcium, vit D for bone health       (I10) Essential (primary) hypertension  (N18.31) Stage 3a chronic kidney disease (H)  Comment: variable bps, GFR 40-50s, so at baseline       Plan: lisinopril 40 mg/day, metoprolol 100 mg bid   Follow bps and BMP      (S81.802D) Unspecified open wound, left lower leg, subsequent encounter  Comment: and right LE, in presence of multiple bruises      Photos reviewed, slow healing  Plan: local wound care and monitoring  Elevation, furosemide 40 mg/day to  minimize edema and promote healing   Follow up with Wound clinic as scheduled       (D69.6) Thrombocytopenia (H24)  Comment: plts , past few years   Lots of bruising  Plan: Consider decreasing aspirin to 81 mg/day at TCU discharge; (prescribed for troponin bump due to demand ischemia and DVT prophylaxis)        Advance directive: DNR/DNI       Meli Red MD

## 2024-04-25 NOTE — LETTER
"    4/25/2024        RE: Dayana Samano  [address]  [place] [address]        Dayana Samano is a 89 year old female seen April 25, 2024 at [facility] TCU where she was admitted after [facility] hospitalization 4/9-12 for right 8-9 rib fractures and manubrium fracture    Pt presented after 3 falls, with shortness of breath, weakness and loss of appetite.   Pt had a subacute decline prior, on several courses of abx for LE wounds and cellulitis.  Followed by Wound clinic, on wounds mostly healed by day of admission, but pt not eating well and had several falls.  Hospital workup for infection or cardiac cause was negative, given IVF for dehydration   Bruise noted on chest wall and pt reported right shoulder pain related to fall   Pt improved and discharged to TCU for ongoing recovery and Rehab.       Today pt is seen in her room lying abed     States \"I'm not getting any care here.\"   Has some other somewhat paranoid thoughts, then distracted by her lunch menu    Helped her fill that out, all she wanted was cheese and crackers, states that is what she has every day for lunch.    Not able to say much about her fall or hospitalization  Does not report pain while lying in bed.   Slow progress with therapies, fatigued but no shortness of breath or hypoxia.       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     History of endocarditis      Osteoporosis, unspecified      Personal history of colonic polyps     last colonoscopy 2000     Past Surgical " History:   Procedure Laterality Date     FEMUR SURGERY Right 2017    nail; right intertrochanteric fracture     LAPAROSCOPIC APPENDECTOMY N/A 2020    Procedure: LAPAROSCOPIC APPENDECTOMY;  Surgeon: Spenser Kent MD;  Location: [facility] OR     ORIF ELBOW FRACTURE Left 01/01/2015    x2       Family History   Problem Relation Age of Onset     Cerebrovascular Disease Father        Social History     Tobacco Use     Smoking status: Former     Current packs/day: 0.00     Types: Cigarettes     Quit date: 1990     Years since quittin.3     Passive exposure: Past     Smokeless tobacco: Never   Substance Use Topics     Alcohol use: Yes     Comment: glass of wine with dinner-sometimes      SH:  Lives in [facility] [address] apartment in [place]    x2   Two daughters, one is an RN     Review Of Systems  Skin: bruising, traumatic wounds     Eyes: impaired vision  Ears/Nose/Throat: hearing loss  Respiratory: No shortness of breath, dyspnea on exertion, cough, or hemoptysis  Cardiovascular: exercise intolerance  Gastrointestinal: negative  Genitourinary: negative  Musculoskeletal: ambulatory with walker at baseline     Now min-mod assist with dressing, transfers with SBA   Ambulates 110-220' with 4WW and CGA     TUG 67s     EMS 11    Neurologic: SLUMS      Psychiatric: negative  Hematologic/Lymphatic/Immunologic: low plts   Endocrine: negative      GENERAL APPEARANCE: alert and no distress, looks younger than her stated age     BP (!) 154/74   Pulse 65   Temp 97.7  F (36.5  C)   Resp 16   Ht 1.524 m (5')   Wt 50.8 kg (112 lb 1.6 oz)   SpO2 95%   BMI 21.89 kg/m     HEENT: normocephalic, no lesion or abnormalities  NECK: no adenopathy, no asymmetry, masses, or scars and thyroid normal to palpation  RESP: lungs clear to auscultation - no rales, rhonchi or wheezes  CV: regular rate and rhythm, normal S1 S2, II-III HSM at left anterior axillary line      ABDOMEN:  soft, nontender, no HSM or  masses and bowel sounds normal  MS: extremities without edema   SKIN: no suspicious lesions or rashes  NEURO: Normal strength and tone, sensory exam grossly normal, and speech normal  PSYCH: affect okay, a little anxious  LYMPHATICS: No cervical,  or supraclavicular nodes     Lab Results   Component Value Date     2024    POTASSIUM 4.4 2024    CHLORIDE 96 (L) 2024    CO2 26 2024    ANIONGAP 15 2024    GLC 87 2024    BUN 27.3 (H) 2024    CR 0.95 2024    GFRESTIMATED 54 (L) 2024    LEONARD 10.7 (H) 2024     Lab Results   Component Value Date    AST 35 2024      ALBUMIN 4.3 2024      ALKPHOS 64 2024     Lab Results   Component Value Date    WBC 3.4 2024      HGB 11.3 2024       2024       2024     TSH   Date Value Ref Range Status   2023 0.93 0.30 - 4.20 uIU/mL Final   06/10/2021 0.60 0.40 - 4.00 mU/L Final      Vit D level 47       IMP/PLAN:   (S22.41XD) Multiple fractures of ribs, right side, subsequent encounter for fracture with routine healin&9  (primary encounter diagnosis)  (W19.XXXS) Fall, sequela  (S22.21XD) Fracture of manubrium, subsequent encounter for fracture with routine healing  (R53.81) Physical deconditioning  Comment: several falls due to weakness, poor safety awareness   Plan: PHYSICAL THERAPY / OCCUPATIONAL THERAPY for safe transfers, balance, gait, strengthening and ADLs.   Discharge goal is return to her AL apartment with services   Pain management with acetaminophen 650 mg qid, local measures   Calcium, vit D for bone health       (I10) Essential (primary) hypertension  (N18.31) Stage 3a chronic kidney disease (H)  Comment: variable bps, GFR 40-50s, so at baseline       Plan: lisinopril 40 mg/day, metoprolol 100 mg bid   Follow bps and BMP      (S81.802D) Unspecified open wound, left lower leg, subsequent encounter  Comment: and right LE, in presence of multiple bruises       Photos reviewed, slow healing  Plan: local wound care and monitoring  Elevation, furosemide 40 mg/day to minimize edema and promote healing   Follow up with Wound clinic as scheduled       (D69.6) Thrombocytopenia (H24)  Comment: plts , past few years   Lots of bruising  Plan: Consider decreasing aspirin to 81 mg/day at TCU discharge; (prescribed for troponin bump due to demand ischemia and DVT prophylaxis)        Advance directive: DNR/DNI       Meli Red MD       Sincerely,        Meli Red MD

## 2024-04-29 ENCOUNTER — TRANSITIONAL CARE UNIT VISIT (OUTPATIENT)
Dept: GERIATRICS | Facility: CLINIC | Age: 89
End: 2024-04-29
Payer: MEDICARE

## 2024-04-29 VITALS
SYSTOLIC BLOOD PRESSURE: 116 MMHG | RESPIRATION RATE: 17 BRPM | HEART RATE: 75 BPM | WEIGHT: 112.1 LBS | DIASTOLIC BLOOD PRESSURE: 67 MMHG | BODY MASS INDEX: 22.01 KG/M2 | HEIGHT: 60 IN | OXYGEN SATURATION: 93 % | TEMPERATURE: 98.5 F

## 2024-04-29 DIAGNOSIS — S81.801D UNSPECIFIED OPEN WOUND, RIGHT LOWER LEG, SUBSEQUENT ENCOUNTER: ICD-10-CM
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
## 2024-04-29 DIAGNOSIS — S22.21XD FRACTURE OF MANUBRIUM, SUBSEQUENT ENCOUNTER FOR FRACTURE WITH ROUTINE HEALING: ICD-10-CM

## 2024-04-29 DIAGNOSIS — I10 ESSENTIAL (PRIMARY) HYPERTENSION: ICD-10-CM

## 2024-04-29 DIAGNOSIS — N18.31 STAGE 3A CHRONIC KIDNEY DISEASE (H): ICD-10-CM

## 2024-04-29 DIAGNOSIS — I50.32 CHRONIC DIASTOLIC CONGESTIVE HEART FAILURE (H): ICD-10-CM

## 2024-04-29 DIAGNOSIS — S81.802D UNSPECIFIED OPEN WOUND, LEFT LOWER LEG, SUBSEQUENT ENCOUNTER: ICD-10-CM

## 2024-04-29 DIAGNOSIS — R53.81 PHYSICAL DECONDITIONING: ICD-10-CM

## 2024-04-29 DIAGNOSIS — S22.41XD MULTIPLE FRACTURES OF RIBS, RIGHT SIDE, SUBSEQUENT ENCOUNTER FOR FRACTURE WITH ROUTINE HEALING: Primary | ICD-10-CM

## 2024-04-29 PROCEDURE — 99309 SBSQ NF CARE MODERATE MDM 30: CPT | Performed by: NURSE PRACTITIONER

## 2024-04-29 NOTE — PROGRESS NOTES
[facility] GERIATRIC SERVICES    Chief Complaint   Patient presents with    Nursing Home Acute       [facility] Medical Record Number:  [id number]  Place of Service where encounter took place:  [facility] ([facility]) [[id number]]    HPI:    Dayana Samano is a 89 year old  (8/30/1924), who is being seen today for an episodic care visit.  HPI information obtained from: facility chart records, facility staff, patient report, and [facility] chart review.Today's concern is: slight soreness in ribs, feels better, moving better.      Multiple fractures of ribs, right side, subsequent encounter for fracture with routine healing  Fracture of manubrium, subsequent encounter for fracture with routine healing  Fall, sequela  Physical deconditioning  Stage 3a chronic kidney disease (H)  Essential (primary) hypertension  Chronic diastolic congestive heart failure (H)  Unspecified open wound, left lower leg, subsequent encounter  Unspecified open wound, right lower leg, subsequent encounter     ALLERGIES: Meperidine, Morphine, Hydromorphone, and No known allergies  Past Medical, Surgical, Family and Social History reviewed and updated in [facility].    Current Outpatient Medications   Medication Sig Dispense Refill    acetaminophen (TYLENOL) 325 MG tablet Take 650 mg by mouth 4 times daily  Between BF and HS  And Tylenol 650 mg daily prn pain      aspirin (ASA) 325 MG tablet Take 325 mg by mouth daily      CALCIUM 600 + D 600-200 MG-UNIT OR TABS Take 1 tablet by mouth daily  3 MONTHS 1 YEAR    furosemide (LASIX) 40 MG tablet Take 1 tablet (40 mg) by mouth daily 90 tablet 3    ketoconazole (NIZORAL) 2 % external shampoo daily as needed for itching or irritation      latanoprost (XALATAN) 0.005 % ophthalmic solution Place 1 drop into both eyes at bedtime      lisinopril (ZESTRIL) 40 MG tablet Take 1 tablet (40 mg) by mouth daily 90 tablet 3    metoprolol tartrate (LOPRESSOR) 100 MG tablet Take 1  tablet (100 mg) by mouth 2 times daily 180 tablet 3    multivitamin (OCUVITE) TABS tablet Take 1 tablet by mouth daily      polyethylene glycol 400 (BLINK TEARS) 0.25 % SOLN ophthalmic solution Place 1 drop into both eyes 2 times daily as needed for dry eyes      timolol maleate (TIMOPTIC) 0.5 % ophthalmic solution Place 1 drop into both eyes every morning           TODAY DURING EXAM/ROS:  No CP, SOB, Cough, dizziness, fevers, chills, HA, N/V, dysuria or Bowel Abnormalities. Appetite is good.  No pain except as noted above.    Physical Exam:  /67   Pulse 75   Temp 98.5  F (36.9  C)   Resp 17   Ht 1.524 m (5')   Wt 50.8 kg (112 lb 1.6 oz)   SpO2 93%   BMI 21.89 kg/m      GENERAL APPEARANCE:  Alert, in no distress, oriented, cooperative  ENT:  Mouth with moist mucous membranes, [place]  EYES:  Conjunctivae, lids, pupils and irises normal  RESP:  respiratory effort of chest normal, lungs clear except  a few crackles right lung,  NAD  CV:  Auscultation of heart done ,IRRR, RRR. Soft syst.murmur. no feet edema, +2 pedal pulses  ABDOMEN:  normal bowel sounds, soft, nontender.  M/S:   GREGORY equally , seen in chair.  SKIN:  Inspection of skin att baseline, except: bruises on right upper arm fading--the leg drsgs are dry and intact--wounds not seen today.  NEURO:   Cranial nerves are grossly intact and  at patient's baseline  PSYCH:  oriented X 3, normal insight, judgement and memory, affect and mood normal     CBC RESULTS:   Recent Labs   Lab Test 04/12/24  0636 04/11/24  0642   WBC 3.4* 3.4*   RBC 3.33* 3.06*   HGB 11.3* 10.3*   HCT 34.0* 32.1*   * 105*   MCH 33.9* 33.7*   MCHC 33.2 32.1   RDW 12.2 12.4   * 99*     Recent Labs   Lab Test 04/25/24  0503 04/18/24  1052    137   POTASSIUM 4.2 4.4   CHLORIDE 102 96*   CO2 27 26   ANIONGAP 10 15   GLC 84 87   BUN 28.2* 27.3*   CR 0.74 0.95   LEONARD 10.2* 10.7*       Last Basic Metabolic Panel:  Recent Labs   Lab Test 04/12/24  0636      POTASSIUM  4.6   CHLORIDE 100   LEONARD 9.8*   CO2 24   BUN 20.7   CR 0.76   *          ASSESSMENT / PLAN:  (S22.41XD) Multiple fractures of ribs, right side, subsequent encounter for fracture with routine healin&9  (primary encounter diagnosis)  (S22.21XD) Fracture of manubrium, subsequent encounter for fracture with routine healing  (W19.XXXS) Fall, sequela  (R53.81) Physical deconditioning  Comment: minimal pains, cont current POC, PT OT, no LCD yet but progressing.  Vit D level was 47 on 2024    (N18.31) Stage 3a chronic kidney disease (H)  Comment:  BUN  steady, check prn    (I10) Essential (primary) hypertension  (I50.32) Chronic diastolic congestive heart failure (H)  Comment/Plan: SBP runs 120- 140's, HR 60-70, cont with asa, lasix, metoprolol.       (S81.095B) Unspecified open wound, left lower leg, subsequent encounter  (S81.643D) Unspecified open wound, right lower leg, subsequent encounter  Comment: cont drsgs changes per Wound Clinic with next  clinic appt upcoming  2024      Electronically signed by  AMANDA Dean CNP

## 2024-05-02 ENCOUNTER — TRANSITIONAL CARE UNIT VISIT (OUTPATIENT)
Dept: GERIATRICS | Facility: CLINIC | Age: 89
End: 2024-05-02
Payer: MEDICARE

## 2024-05-02 VITALS
DIASTOLIC BLOOD PRESSURE: 71 MMHG | BODY MASS INDEX: 21.89 KG/M2 | TEMPERATURE: 97.8 F | SYSTOLIC BLOOD PRESSURE: 157 MMHG | HEART RATE: 66 BPM | HEIGHT: 60 IN | OXYGEN SATURATION: 96 % | RESPIRATION RATE: 17 BRPM | WEIGHT: 111.5 LBS

## 2024-05-02 DIAGNOSIS — R53.81 PHYSICAL DECONDITIONING: ICD-10-CM

## 2024-05-02 DIAGNOSIS — I50.32 CHRONIC DIASTOLIC CONGESTIVE HEART FAILURE (H): ICD-10-CM

## 2024-05-02 DIAGNOSIS — N18.31 STAGE 3A CHRONIC KIDNEY DISEASE (H): ICD-10-CM
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
## 2024-05-02 DIAGNOSIS — I10 ESSENTIAL (PRIMARY) HYPERTENSION: ICD-10-CM

## 2024-05-02 DIAGNOSIS — S22.41XD MULTIPLE FRACTURES OF RIBS, RIGHT SIDE, SUBSEQUENT ENCOUNTER FOR FRACTURE WITH ROUTINE HEALING: Primary | ICD-10-CM

## 2024-05-02 DIAGNOSIS — K30 UPSET STOMACH: ICD-10-CM

## 2024-05-02 PROCEDURE — 99309 SBSQ NF CARE MODERATE MDM 30: CPT

## 2024-05-02 RX ORDER — CALCIUM CARBONATE 500 MG/1
1 TABLET, CHEWABLE ORAL 2 TIMES DAILY
COMMUNITY
Start: 2024-05-02

## 2024-05-02 RX ORDER — AMLODIPINE BESYLATE 2.5 MG/1
2.5 TABLET ORAL DAILY
COMMUNITY
Start: 2024-05-02 | End: 2024-05-09

## 2024-05-02 NOTE — LETTER
5/2/2024        RE: Dayana Samano  [address]  [place] [facility] [address]        He has [facility] GERIATRICS    Chief Complaint   Patient presents with     RECHECK     HPI:  Dayana Samano is a 89 year old  (8/30/1924), who is being seen today for an episodic care visit at: [facility] ([facility]) [[id number]]. Today's concern is: Episodic visit recheck.    Today's visit  Patient was seen sitting in the chair in the room.  She just came back after walking with therapy.  Staff reports she has elevated blood pressure after blood pressure medication She does not look like she is fluid overloaded.  She is currently on daily Lasix.  Nursing also reports she also has upset stomach during the evening and early morning.  . She denies chest pain, shortness of breath, dizziness, headache, lightheadedness, bowel or bladder problems, or any acute issues.     History obtained from: facility chart records, facility staff, patient report, [facility] chart review, and Care Everywhere [facility] chart review         Allergies, and PMH/PSH reviewed in [facility] today.  REVIEW OF SYSTEMS:    4 point ROS including Respiratory, CV, GI and , other than that noted in the HPI,  is negative    Objective:   BP (!) 157/71   Pulse 66   Temp 97.8  F (36.6  C)   Resp 17   Ht 1.524 m (5')   Wt 50.6 kg (111 lb 8 oz)   SpO2 96%   BMI 21.78 kg/m    GENERAL APPEARANCE:  Alert, in no distress  CV:  Palpation and auscultation of heart done , regular rate and rhythm, no murmur, rub, or gallop, no edema  ABDOMEN:  normal bowel sounds, soft, nontender, no hepatosplenomegaly or other masses, bowel sounds normal  :    Voiding without any problem stress incontinence sometimes  PSYCH:  oriented to x2    Most Recent 3 CBC's:  Recent Labs   Lab Test 04/12/24  0636 04/11/24  0642 04/10/24  0808   WBC 3.4* 3.4* 4.5   HGB 11.3* 10.3* 10.7*   * 105* 102*   * 99* 106*     Most Recent 3 BMP's:  Recent  Labs   Lab Test 04/25/24  0503 04/18/24  1052 04/12/24  0636    137 135   POTASSIUM 4.2 4.4 4.6   CHLORIDE 102 96* 100   CO2 27 26 24   BUN 28.2* 27.3* 20.7   CR 0.74 0.95 0.76   ANIONGAP 10 15 11   LEONARD 10.2* 10.7* 9.8*   GLC 84 87 104*       Assessment/Plan:  (S22.41XD) Multiple fractures of ribs, right side, subsequent encounter for fracture with routine healing  (primary encounter diagnosis)  (W19.XXXS) Fall, sequela  (R53.81) Physical deconditioning  Comment: Acute on chronic condition.  Patient has multiple fall history due to weakness.  Plan: Working with PT and OT for safe transfer balance gait strengthening and ADLs.  She will return to AL at the end of the week potential discharge day as 5/9/2024.  -  Her slums score is 24/30  -Continue calcium and vitamin D    (N18.31) Stage 3a chronic kidney disease (H)  (I10) Essential (primary) hypertension  (I50.32) Chronic diastolic congestive heart failure (H)  Comment: Acute on chronic condition.  Blood pressure uncontrolled ranging    175-156 after Antivert antihypertensive is given.  Plan:   -Lisinopril changed to twice daily  -Continue metoprolol 100 mg  -Added amlodipine 2.5 mg daily  -Continue 40 mg Lasix      (K30 ) Upset stomach  Comment: Acute condition reported by staff patient unaware of symptoms at this time.  Staff reports she complains of upset stomach mostly on it during evening  Plan:   -Calcium carbonate twice a day  -Monitor for changes in bowel habit    MED REC REQUIRED  Post Medication Reconciliation Status: discharge medications reconciled and changed, per note/orders      Orders:  Amlodipine 2.5 mg daily   Lisinopril changed to 20 mg twice daily  Calcium carbonate 500 mg twice a day    Electronically signed by: AMANDA Manning CNP       Sincerely,        AMANDA Manning CNP

## 2024-05-02 NOTE — PROGRESS NOTES
He has [facility] GERIATRICS    Chief Complaint   Patient presents with    RECHECK     HPI:  Dayana Samano is a 89 year old  (8/30/1924), who is being seen today for an episodic care visit at: [facility] ([facility]) [[id number]]. Today's concern is: Episodic visit recheck.    Today's visit  Patient was seen sitting in the chair in the room.  She just came back after walking with therapy.  Staff reports she has elevated blood pressure after blood pressure medication She does not look like she is fluid overloaded.  She is currently on daily Lasix.  Nursing also reports she also has upset stomach during the evening and early morning.  . She denies chest pain, shortness of breath, dizziness, headache, lightheadedness, bowel or bladder problems, or any acute issues.     History obtained from: facility chart records, facility staff, patient report, [facility] chart review, and Care Everywhere [facility] chart review         Allergies, and PMH/PSH reviewed in [facility] today.  REVIEW OF SYSTEMS:    4 point ROS including Respiratory, CV, GI and , other than that noted in the HPI,  is negative    Objective:   BP (!) 157/71   Pulse 66   Temp 97.8  F (36.6  C)   Resp 17   Ht 1.524 m (5')   Wt 50.6 kg (111 lb 8 oz)   SpO2 96%   BMI 21.78 kg/m    GENERAL APPEARANCE:  Alert, in no distress  CV:  Palpation and auscultation of heart done , regular rate and rhythm, no murmur, rub, or gallop, no edema  ABDOMEN:  normal bowel sounds, soft, nontender, no hepatosplenomegaly or other masses, bowel sounds normal  :    Voiding without any problem stress incontinence sometimes  PSYCH:  oriented to x2    Most Recent 3 CBC's:  Recent Labs   Lab Test 04/12/24  0636 04/11/24  0642 04/10/24  0808   WBC 3.4* 3.4* 4.5   HGB 11.3* 10.3* 10.7*   * 105* 102*   * 99* 106*     Most Recent 3 BMP's:  Recent Labs   Lab Test 04/25/24  0503 04/18/24  1052 04/12/24  0636    137 135   POTASSIUM 4.2 4.4  4.6   CHLORIDE 102 96* 100   CO2 27 26 24   BUN 28.2* 27.3* 20.7   CR 0.74 0.95 0.76   ANIONGAP 10 15 11   LEONARD 10.2* 10.7* 9.8*   GLC 84 87 104*       Assessment/Plan:  (S22.41XD) Multiple fractures of ribs, right side, subsequent encounter for fracture with routine healing  (primary encounter diagnosis)  (W19.XXXS) Fall, sequela  (R53.81) Physical deconditioning  Comment: Acute on chronic condition.  Patient has multiple fall history due to weakness.  Plan: Working with PT and OT for safe transfer balance gait strengthening and ADLs.  She will return to AL at the end of the week potential discharge day as 5/9/2024.  -  Her slums score is 24/30  -Continue calcium and vitamin D    (N18.31) Stage 3a chronic kidney disease (H)  (I10) Essential (primary) hypertension  (I50.32) Chronic diastolic congestive heart failure (H)  Comment: Acute on chronic condition.  Blood pressure uncontrolled ranging    175-156 after Antivert antihypertensive is given.Medication changed with Hold parameter of SBP<110  Plan:   -Lisinopril changed to twice daily   -Continue metoprolol 100 mg  -Added amlodipine 2.5 mg daily  -Continue 40 mg Lasix  - BMP ordered for 5/6/24    (K30 ) Upset stomach  Comment: Acute condition reported by staff patient unaware of symptoms at this time.  Staff reports she complains of upset stomach mostly on it during evening  Plan:   -Calcium carbonate twice a day  -Monitor for changes in bowel habit    MED REC REQUIRED  Post Medication Reconciliation Status: discharge medications reconciled and changed, per note/orders      Orders:  Amlodipine 2.5 mg daily   Lisinopril changed to 20 mg twice daily  Calcium carbonate 500 mg twice a day  BMP 5/6    Electronically signed by: AMANDA Manning CNP

## 2024-05-03 ENCOUNTER — TRANSITIONAL CARE UNIT VISIT (OUTPATIENT)
Dept: GERIATRICS | Facility: CLINIC | Age: 89
End: 2024-05-03
Payer: MEDICARE

## 2024-05-03 VITALS
TEMPERATURE: 97.7 F | WEIGHT: 111.5 LBS | HEART RATE: 66 BPM | HEIGHT: 60 IN | SYSTOLIC BLOOD PRESSURE: 157 MMHG | OXYGEN SATURATION: 94 % | BODY MASS INDEX: 21.89 KG/M2 | DIASTOLIC BLOOD PRESSURE: 69 MMHG | RESPIRATION RATE: 18 BRPM

## 2024-05-03 DIAGNOSIS — N18.31 STAGE 3A CHRONIC KIDNEY DISEASE (H): ICD-10-CM

## 2024-05-03 DIAGNOSIS — S22.21XD FRACTURE OF MANUBRIUM, SUBSEQUENT ENCOUNTER FOR FRACTURE WITH ROUTINE HEALING: ICD-10-CM

## 2024-05-03 DIAGNOSIS — S22.41XD MULTIPLE FRACTURES OF RIBS, RIGHT SIDE, SUBSEQUENT ENCOUNTER FOR FRACTURE WITH ROUTINE HEALING: Primary | ICD-10-CM

## 2024-05-03 DIAGNOSIS — I50.32 CHRONIC DIASTOLIC CONGESTIVE HEART FAILURE (H): ICD-10-CM

## 2024-05-03 DIAGNOSIS — S81.802D UNSPECIFIED OPEN WOUND, LEFT LOWER LEG, SUBSEQUENT ENCOUNTER: ICD-10-CM

## 2024-05-03 DIAGNOSIS — I10 ESSENTIAL (PRIMARY) HYPERTENSION: ICD-10-CM
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
## 2024-05-03 DIAGNOSIS — R53.81 PHYSICAL DECONDITIONING: ICD-10-CM

## 2024-05-03 DIAGNOSIS — S81.801D UNSPECIFIED OPEN WOUND, RIGHT LOWER LEG, SUBSEQUENT ENCOUNTER: ICD-10-CM

## 2024-05-03 PROCEDURE — 99316 NF DSCHRG MGMT 30 MIN+: CPT | Performed by: NURSE PRACTITIONER

## 2024-05-03 RX ORDER — LISINOPRIL 20 MG/1
20 TABLET ORAL 2 TIMES DAILY
COMMUNITY
Start: 2024-05-02 | End: 2024-05-09

## 2024-05-03 NOTE — PROGRESS NOTES
[facility] DISCHARGE SUMMARY    PATIENT'S NAME: Dayana Samano  YOB: 1924    PRIMARY CARE PROVIDER AND CLINIC RESPONSIBLE AFTER TRANSFER: Elisha Gaonaanna     CODE STATUS DNR/DNI    TRANSFERRING PROVIDERS: AMANDA Dean CNP, Meli Red MD      DATE OF SNF ADMISSION:  April / 12 / 2024    DATE OF SNF DISCHARGE (including anticipating DC): May / 09 /  2024    DISCHARGE DISPOSITION: Assisted Living: [facility]    Nursing Facility: [facility] stay 4/9 to 4/12/24.     Condition on Discharge:  Stable.    Function:         DISCHARGE DIAGNOSIS:      Multiple fractures of ribs, right side, subsequent encounter for fracture with routine healing  Fracture of manubrium, subsequent encounter for fracture with routine healing  Fall, sequela  Physical deconditioning  Stage 3a chronic kidney disease (H)  Essential (primary) hypertension  Chronic diastolic congestive heart failure (H)  Unspecified open wound, left lower leg, subsequent encounter  Unspecified open wound, right lower leg, subsequent encounter        HOSPITAL COURSE:  pt came in  3 recent falls, feeling sob-- workup negative for pneumonia or other acute resp concerns and felt to be from fractured ribs and upper sternal fracture.   UA  (-) also. She had a sore shoulder also from the falls.  Trop up--?NSTEMI, cont with PTA ASA.  Her  Na low, 133.  Also NERY--possibly dehydrated or due to recent bactrim that she took for the bilat leg wound. She is being treated at wound clinic for chronic leg wounds on shins. Sent for rehab    TCU/SNF COURSE: pt has progressed with PT and OT--pain has subsided to virtually none, per pt. She wants less tylenol. Bruising is fading mobility has improved, her SBP has been at times a bit high but usually 130-140-150's, occas higher.  Lisinopril;  was changed to bid yest and norvasc 2.5mg added to help even  out BP control. Otherwise she has wounds on legs--right form the fall and left chronic; drsg changes show no infection, and she is followed by wound clinic at . 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    History of endocarditis     Osteoporosis, unspecified     Personal history of colonic polyps     last colonoscopy 2000       DISCHARGE MEDICATIONS:  Current Outpatient Medications   Medication Sig Dispense Refill    lisinopril (ZESTRIL) 20 MG tablet Take 20 mg by mouth 2 times daily      amLODIPine (NORVASC) 2.5 MG tablet Take 1 tablet (2.5 mg) by mouth daily      aspirin (ASA) 325 MG tablet Take 325 mg by mouth daily      CALCIUM 600 + D 600-200 MG-UNIT OR TABS Take 1 tablet by mouth daily  3 MONTHS 1 YEAR    calcium carbonate (TUMS) 500 MG chewable tablet Take 1 tablet (500 mg) by mouth 2 times daily      furosemide (LASIX) 40 MG tablet Take 1 tablet (40 mg) by mouth daily 90 tablet 3    ketoconazole (NIZORAL) 2 % external shampoo daily as needed for itching or irritation      latanoprost (XALATAN) 0.005 % ophthalmic solution Place 1 drop into both eyes at bedtime      metoprolol tartrate (LOPRESSOR) 100 MG tablet Take 1 tablet (100 mg) by mouth 2 times daily 180 tablet 3    multivitamin (OCUVITE) TABS tablet Take 1 tablet by mouth daily      polyethylene glycol 400 (BLINK TEARS) 0.25 % SOLN ophthalmic solution Place 1 drop into both eyes 2 times daily as needed for dry eyes      timolol maleate (TIMOPTIC) 0.5 %  ophthalmic solution Place 1 drop into both eyes every morning         MEDICATION CHANGES/RATIONALE:   See discharge orders  BP (!) 157/69   Pulse 66   Temp 97.7  F (36.5  C)   Resp 18   Ht 1.524 m (5')   Wt 50.6 kg (111 lb 8 oz)   SpO2 94%   BMI 21.78 kg/m      TODAY DURING EXAM/ROS:  No CP, SOB, Cough, dizziness, fevers, chills, HA, N/V, dysuria or Bowel Abnormalities. Appetite is good.  No pain.      PHYSICAL EXAM Today:  A & O x 3, NAD. Lungs CTA, non labored. RRR, S1/S2 w/o murmur,gallop or rub.  Trace LE edema. TubiGrip wraps on legs over drsgs Abdomen soft, nontender, +BT'S. No focal neurological deficits. Up walking with PT.   See PCC for actual Wound pics.  Per staff-Improving.  Has fading bruises, left knee, right arm, chest, side..       SNF LABS  Recent Labs   Lab Test 04/25/24  0503 04/18/24  1052    137   POTASSIUM 4.2 4.4   CHLORIDE 102 96*   CO2 27 26   ANIONGAP 10 15   GLC 84 87   BUN 28.2* 27.3*   CR 0.74 0.95   LEONARD 10.2* 10.7*     Hemoglobin   Date Value Ref Range Status   04/12/2024 11.3 (L) 11.7 - 15.7 g/dL Final   04/11/2024 10.3 (L) 11.7 - 15.7 g/dL Final   06/10/2021 12.7 11.7 - 15.7 g/dL Final   05/13/2020 12.3 11.7 - 15.7 g/dL Final             DISCHARGE PLAN:  Occupational Therapy, Physical Therapy, Registered Nurse, Home Health Aide, and From:  [facility]  Follow-up with PCP in 7 days:     Current [place] or other scheduled appointments:        MTM referral needed and placed by this provider: none    Pending labs:   [facility]  AMANDA Dean CNP, Internal Medicine      Discharge Treatments:none except wound changes---see discharge orders       TOTAL DISCHARGE TIME:   Greater than 30 minutes    AMANDA Dean CNP    [place] [facility]              Documentation of Face-to-Face and Certification for Home Health Services     Patient: Dayana L [place]   YOB: 1924  MR Number: [id number]  Today's Date: 5/2/2024    I  certify that patient: Daynaa Samano is under my care and that I, or a nurse practitioner or physician's assistant working with me, had a face-to-face encounter that meets the physician face-to-face encounter requirements with this patient on: 5/3/2024.    This encounter with the patient was in whole, or in part, for the following medical condition, which is the primary reason for home health care:      Multiple fractures of ribs, right side, subsequent encounter for fracture with routine healing  Fracture of manubrium, subsequent encounter for fracture with routine healing  Fall, sequela  Physical deconditioning  Stage 3a chronic kidney disease (H)  Essential (primary) hypertension  Chronic diastolic congestive heart failure (H)  Unspecified open wound, left lower leg, subsequent encounter  Unspecified open wound, right lower leg, subsequent encounter  .    I certify that, based on my findings, the following services are medically necessary home health services: Nursing, Occupational Therapy, Physical Therapy, and hha .    My clinical findings support the need for the above services because: Nurse is needed: drsg changes to legs, checking bps with HTN med changes.., Occupational Therapy Services are needed to assess and treat cognitive ability and address ADL safety due to impairment in mobility, deconditioning and weakness., and Physical Therapy Services are needed to assess and treat the following functional impairments: deconditioning and see under OT and above diagnoses.    Further, I certify that my clinical findings support that this patient is homebound (i.e. absences from home require considerable and taxing effort and are for medical reasons or [facility] services or infrequently or of short duration when for other reasons) because: Requires assistance of another person or specialized equipment to access medical services because patient: Is unable to walk greater than 350 feet without rest...    Based on the  above findings. I certify that this patient is confined to the home and needs intermittent skilled nursing care, physical therapy and/or speech therapy.  The patient is under my care, and I have initiated the establishment of the plan of care.  This patient will be followed by a physician who will periodically review the plan of care.  Physician/Provider to provide follow up care: Elisha Gaona    Responsible Medicare certified P[employer] Physician: Gena Ann RN, CNP  Physician Signature: See electronic signature associated with these discharge orders.  Date: 5/2/2024

## 2024-05-03 NOTE — PATIENT INSTRUCTIONS
[facility] Discharge Orders    Name: Dayana Samano  : 1924  Planned Discharge Date: 2024  Discharged to: previous assisted living    MEDICAL FOLLOW UP  Follow up with PCP in 1-2 weeks  Follow up with Specialists: has [facility] Appts--d/w family for dates and times        FUTURE LABS:  BMP due with results to TCU PCP    ORDER CHANGES:  Lisinopril adjusted to 20 mg bid, Norvasc added for High BP    DISCHARGE MEDICATIONS:  The patient s pharmacy is authorized to dispense a 30-day supply of medications. Refill requests should be directed to the primary provider, Elisha Gaona .   No narcotics are prescribed at time of discharge.   Current Outpatient Medications   Medication Sig Dispense Refill    lisinopril (ZESTRIL) 20 MG tablet Take 20 mg by mouth 2 times daily      amLODIPine (NORVASC) 2.5 MG tablet Take 1 tablet (2.5 mg) by mouth daily      aspirin (ASA) 325 MG tablet Take 325 mg by mouth daily      CALCIUM 600 + D 600-200 MG-UNIT OR TABS Take 1 tablet by mouth daily  3 MONTHS 1 YEAR    calcium carbonate (TUMS) 500 MG chewable tablet Take 1 tablet (500 mg) by mouth 2 times daily      furosemide (LASIX) 40 MG tablet Take 1 tablet (40 mg) by mouth daily 90 tablet 3    ketoconazole (NIZORAL) 2 % external shampoo daily as needed for itching or irritation      latanoprost (XALATAN) 0.005 % ophthalmic solution Place 1 drop into both eyes at bedtime      metoprolol tartrate (LOPRESSOR) 100 MG tablet Take 1 tablet (100 mg) by mouth 2 times daily 180 tablet 3    multivitamin (OCUVITE) TABS tablet Take 1 tablet by mouth daily      polyethylene glycol 400 (BLINK TEARS) 0.25 % SOLN ophthalmic solution Place 1 drop into both eyes 2 times daily as needed for dry eyes      timolol maleate (TIMOPTIC) 0.5 % ophthalmic solution Place 1 drop into both eyes every morning         SERVICES:  Home Care:  Occupational Therapy, Physical Therapy, Registered Nurse, Home Health Aide, and From:   [facility]    ADDITIONAL INSTRUCTIONS:  Wound care per wound care clinic orders-see pcc.     Gena Ann, APRN CNP  This document was electronically signed on May 3, 2024

## 2024-05-04 ENCOUNTER — LAB REQUISITION (OUTPATIENT)
Dept: LAB | Facility: CLINIC | Age: 89
End: 2024-05-04
Payer: MEDICARE

## 2024-05-04 DIAGNOSIS — N18.9 CHRONIC KIDNEY DISEASE, UNSPECIFIED: ICD-10-CM

## 2024-05-04 DIAGNOSIS — I10 ESSENTIAL (PRIMARY) HYPERTENSION: ICD-10-CM

## 2024-05-06 ENCOUNTER — TRANSITIONAL CARE UNIT VISIT (OUTPATIENT)
Dept: GERIATRICS | Facility: CLINIC | Age: 89
End: 2024-05-06
Payer: MEDICARE

## 2024-05-06 VITALS
WEIGHT: 111.5 LBS | SYSTOLIC BLOOD PRESSURE: 134 MMHG | TEMPERATURE: 97.6 F | BODY MASS INDEX: 21.89 KG/M2 | HEIGHT: 60 IN | RESPIRATION RATE: 18 BRPM | HEART RATE: 67 BPM | DIASTOLIC BLOOD PRESSURE: 79 MMHG | OXYGEN SATURATION: 93 %

## 2024-05-06 DIAGNOSIS — I95.9 HYPOTENSION, UNSPECIFIED HYPOTENSION TYPE: ICD-10-CM

## 2024-05-06 DIAGNOSIS — I10 ESSENTIAL HYPERTENSION, BENIGN: ICD-10-CM

## 2024-05-06 DIAGNOSIS — N18.31 STAGE 3A CHRONIC KIDNEY DISEASE (H): Primary | ICD-10-CM

## 2024-05-06 LAB
ANION GAP SERPL CALCULATED.3IONS-SCNC: 13 MMOL/L (ref 7–15)
BUN SERPL-MCNC: 28.3 MG/DL (ref 8–23)
CALCIUM SERPL-MCNC: 10.7 MG/DL (ref 8.2–9.6)
CHLORIDE SERPL-SCNC: 98 MMOL/L (ref 98–107)
CREAT SERPL-MCNC: 0.84 MG/DL (ref 0.51–0.95)
DEPRECATED HCO3 PLAS-SCNC: 30 MMOL/L (ref 22–29)
EGFRCR SERPLBLD CKD-EPI 2021: 62 ML/MIN/1.73M2
GLUCOSE SERPL-MCNC: 140 MG/DL (ref 70–99)
POTASSIUM SERPL-SCNC: 3.7 MMOL/L (ref 3.4–5.3)
SODIUM SERPL-SCNC: 141 MMOL/L (ref 135–145)

## 2024-05-06 PROCEDURE — 99309 SBSQ NF CARE MODERATE MDM 30: CPT | Performed by: NURSE PRACTITIONER

## 2024-05-06 PROCEDURE — 36415 COLL VENOUS BLD VENIPUNCTURE: CPT | Performed by: INTERNAL MEDICINE

## 2024-05-06 PROCEDURE — P9604 ONE-WAY ALLOW PRORATED TRIP: HCPCS | Performed by: INTERNAL MEDICINE

## 2024-05-06 PROCEDURE — 80048 BASIC METABOLIC PNL TOTAL CA: CPT | Performed by: INTERNAL MEDICINE

## 2024-05-06 NOTE — PROGRESS NOTES
CC: Lab Recheck     HPI:    Dayana Samano is a 89 year old  (8/30/1924), who is being seen today for an episodic care visit at ***  TCU or  ***LTC***. Today's concern: lab recheck of BMP        SUBJECTIVE/OBJECTIVE: No C/O's ***. A & O x 3, NAD. Lungs CTA, non labored. RRR, S1/S2 w/o murmur,gallop or rub.  ***edema.  Abdomen soft, nontender, +BT'S. No focal neurological deficits.      /79   Pulse 67   Temp 97.6  F (36.4  C)   Resp 18   Ht 1.524 m (5')   Wt 50.6 kg (111 lb 8 oz)   SpO2 93%   BMI 21.78 kg/m      LABS: today ***         ASSESSMENT / PLAN:  No diagnosis found.    Electronically Signed by:    Gena Ann RN, CNP

## 2024-05-06 NOTE — PROGRESS NOTES
CC: Lab Recheck     HPI:    Dayana Samano is a 89 year old  (8/30/1924), who is being seen today for an episodic care visit at    TCU.  Today's concern: lab recheck of   Bmp     Stage 3a chronic kidney disease (H)  Essential hypertension, benign  Hypotension, unspecified hypotension type         SUBJECTIVE/OBJECTIVE: No C/O's  . A & O x 3, NAD.   Per staff SBP was 108 this am so Norvasc, bb and ace held.  /79   Pulse 67   Temp 97.6  F (36.4  C)   Resp 18   Ht 1.524 m (5')   Wt 50.6 kg (111 lb 8 oz)   SpO2 93%   BMI 21.78 kg/m      LABS: today   Recent Labs   Lab Test 05/06/24  0935 04/25/24  0503    139   POTASSIUM 3.7 4.2   CHLORIDE 98 102   CO2 30* 27   ANIONGAP 13 10   * 84   BUN 28.3* 28.2*   CR 0.84 0.74   LEONARD 10.7* 10.2*     ASSESSMENT / PLAN:  (N18.31) Stage 3a chronic kidney disease (H)  (primary encounter diagnosis)  Comment: steady BMP, check in 1-2 mos or prn as condition warrants.    (I10) Essential hypertension, benign  (I95.9) Hypotension, unspecified hypotension type  Comment:  SBP had been labile with range of 120-160's . Lisinopril changed from 40 mg daily to 20 mg bid.  Norvasc added.  May need to adjust Lisinopril down or consider discontinue Norvasc.  Pt is fall risk, will watch for now and CV meds have hold parameters.      Electronically Signed by:    Gena Ann RN, CNP

## 2024-05-07 ENCOUNTER — HOSPITAL ENCOUNTER (OUTPATIENT)
Dept: WOUND CARE | Facility: CLINIC | Age: 89
Discharge: HOME | End: 2024-05-07
Attending: FAMILY MEDICINE | Admitting: FAMILY MEDICINE
Payer: MEDICARE

## 2024-05-07 VITALS — TEMPERATURE: 97.6 F | SYSTOLIC BLOOD PRESSURE: 163 MMHG | DIASTOLIC BLOOD PRESSURE: 71 MMHG | HEART RATE: 67 BPM

## 2024-05-07 DIAGNOSIS — R60.0 LOWER EXTREMITY EDEMA: ICD-10-CM

## 2024-05-07 DIAGNOSIS — S81.801D WOUND OF RIGHT LOWER EXTREMITY, SUBSEQUENT ENCOUNTER: ICD-10-CM

## 2024-05-07 DIAGNOSIS — S81.802D WOUND OF LEFT LOWER EXTREMITY, SUBSEQUENT ENCOUNTER: Primary | ICD-10-CM

## 2024-05-07 PROCEDURE — 99214 OFFICE O/P EST MOD 30 MIN: CPT | Performed by: FAMILY MEDICINE

## 2024-05-07 PROCEDURE — 97602 WOUND(S) CARE NON-SELECTIVE: CPT

## 2024-05-07 NOTE — PROGRESS NOTES
Wound Clinic Note         Visit date: 05/07/2024       Cheif Complaint:     Dayana Samano is a 89 year old   female had concerns including WOUND CARE.  The patient has lower extremity edema and bilateral leg ulcers.         HISTORY OF PRESENT ILLNESS:    Dayana Samano reports the wound has been present since March 21, 2024.  The wound began due to the area being bumped.   Prior to this she had 1 other difficult to heal wound on the left anterior shin due to a skin cancer removal.  He is accompanied to the wound clinic for her initial evaluation with me by her daughter who provides much of the history.  Her daughter is also been doing the dressing changes.    Since her last visit with me her leg wounds have been bandaged with Adaptic, an ABD pad and a spandagrip stocking changed every other day.  She has been at a TCU for most of the time since her last visit with me but hopes to be discharged home later this week and then her daughter will take over the dressing changes.        The pateint denies fevers or chills.  They report the pain from the wound has been 0/10 and has remained about the same recently.      The patient reports they currently do not have any routine for elevating their legs.  There is still struggling with getting the staff at the TCU to help the patient to elevate her legs.    She does take Lasix to help control her swelling.    Today the patient reports maintaining a high protein diet and taking protein supplements regularly.        The patient denies a history of diabetes, smoking or chronic steroid use.         The patient has not had any symptoms of infection relating to the wound recently and is not currently on antibiotics.         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       Surgical Hx:   Past Surgical History:   Procedure Laterality Date    FEMUR SURGERY Right 05/16/2017    nail; right intertrochanteric fracture    LAPAROSCOPIC APPENDECTOMY N/A 5/8/2020    Procedure: LAPAROSCOPIC APPENDECTOMY;  Surgeon: Spenser Kent MD;  Location: [facility] OR    ORIF ELBOW FRACTURE Left 01/01/2015    x2          Allergies:    Allergies   Allergen Reactions    Meperidine Nausea and Vomiting    Morphine Nausea and Vomiting    Hydromorphone Nausea and Vomiting              Medication History:    Current Outpatient Medications   Medication Sig Dispense Refill    amLODIPine (NORVASC) 2.5 MG tablet Take 1 tablet (2.5 mg) by mouth daily      aspirin (ASA) 325 MG tablet Take 325 mg by mouth daily      CALCIUM 600 + D 600-200 MG-UNIT OR TABS Take 1 tablet by mouth daily  3 MONTHS 1 YEAR    calcium carbonate (TUMS) 500 MG chewable tablet Take 1 tablet (500 mg) by mouth 2 times daily      furosemide (LASIX) 40 MG tablet Take 1 tablet (40 mg) by mouth daily 90 tablet 3    ketoconazole (NIZORAL) 2 % external shampoo daily as needed for itching or irritation      latanoprost (XALATAN) 0.005 % ophthalmic solution Place 1 drop into both eyes at bedtime      lisinopril (ZESTRIL) 20 MG tablet Take 20 mg by mouth 2 times daily      metoprolol tartrate (LOPRESSOR) 100 MG tablet Take 1 tablet (100 mg) by mouth 2 times daily 180 tablet 3    multivitamin (OCUVITE) TABS tablet Take 1 tablet by mouth daily      polyethylene glycol 400 (BLINK TEARS) 0.25 % SOLN ophthalmic solution  Place 1 drop into both eyes 2 times daily as needed for dry eyes      timolol maleate (TIMOPTIC) 0.5 % ophthalmic solution Place 1 drop into both eyes every morning       No current facility-administered medications for this encounter.         Tobacco History:  reports that she quit smoking about 34 years ago. Her smoking use included cigarettes. She has been exposed to tobacco smoke. She has never used smokeless tobacco.       REVIEW OF SYMPTOMS:   The review of systems was negative except as noted in the HPI.           PHYSICAL EXAMINATION:     BP (!) 163/71 (BP Location: Left arm, Patient Position: Fowlers)   Pulse 67   Temp 97.6  F (36.4  C) (Temporal)            GENERAL: The patient overall appears well and is no acute distress.   HEAD: normocephalic   EYES: Sclera and conjunctiva clear   NECK: no obvious masses   LUNGS: breathing is unlabored.   EXTREMITIES: No clubbing, cyanosis or edema   SKIN: No rashes or other abnormalities except as noted under the Wound section below.   NEUROLOGICAL: normal motor and sensory function   EDEMA: Mild      WOUND: The wound appears healthy with no sign of infection.   Wound bed: granulation tissue   Periwound: healthy intact skin  There are just 2 tiny wounds remaining, the wounds are nearly healed.      Also see below for wound details:       Circumferential volume measures:             No data to display                Ulceration(s)/Wound(s):   Please see the media tab under the chart review for pictures of the wounds.  Nursing staff removed dressings and cleansed wound.    Wound (used by [facility] only) 04/09/24 1110 Right anterior;lower leg unspecified (Active)   Thickness/Stage full thickness 05/07/24 1100   Base red;slough 05/07/24 1100   Periwound blue/purple;edematous 05/07/24 1100   Periwound Temperature warm 05/07/24 1100   Periwound Skin Turgor soft 05/07/24 1100   Edges open 05/07/24 1100   Length (cm) 0.3 05/07/24 1100   Width (cm) 0.4 05/07/24 1100   Depth (cm)  0.1 05/07/24 1100   Wound (cm^2) 0.12 cm^2 05/07/24 1100   Wound Volume (cm^3) 0.01 cm^3 05/07/24 1100   Wound healing % 93.85 05/07/24 1100   Drainage Characteristics/Odor serosanguineous 05/07/24 1100   Drainage Amount moderate 05/07/24 1100       Wound (used by [facility] only) 04/09/24 1110 Right lateral;lower leg unspecified (Active)   Thickness/Stage full thickness 05/07/24 1100   Base red;slough 05/07/24 1100   Periwound blue/purple;edematous 05/07/24 1100   Periwound Temperature warm 05/07/24 1100   Periwound Skin Turgor soft 05/07/24 1100   Edges open 05/07/24 1100   Length (cm) 0.3 05/07/24 1100   Width (cm) 0.3 05/07/24 1100   Depth (cm) 0.1 05/07/24 1100   Wound (cm^2) 0.09 cm^2 05/07/24 1100   Wound Volume (cm^3) 0.01 cm^3 05/07/24 1100   Wound healing % 75 05/07/24 1100   Drainage Characteristics/Odor serosanguineous 05/07/24 1100   Drainage Amount moderate 05/07/24 1100       Wound (used by [facility] only) 04/09/24 1110 Left lateral;lower leg other (see comments) (Active)   Thickness/Stage full thickness 05/07/24 1100   Base red;granulating 05/07/24 1100   Periwound edematous;blue/purple 05/07/24 1100   Periwound Temperature warm 05/07/24 1100   Periwound Skin Turgor soft 05/07/24 1100   Edges open 05/07/24 1100   Length (cm) 0.2 05/07/24 1100   Width (cm) 0.4 05/07/24 1100   Depth (cm) 0.1 05/07/24 1100   Wound (cm^2) 0.08 cm^2 05/07/24 1100   Wound Volume (cm^3) 0.01 cm^3 05/07/24 1100   Wound healing % 99.35 05/07/24 1100   Drainage Characteristics/Odor serosanguineous 05/07/24 1100   Drainage Amount moderate 05/07/24 1100               Recent Labs   Lab Test 05/09/20  0909   A1C 5.4          Recent Labs   Lab Test 08/02/23  1521 07/12/22  1629 06/10/21  1547   ALBUMIN 4.7 4.3 4.4              No sharp debridement performed today.               ASSESSMENT:   This is a 89 year old  female with bilateral leg ulcers, the patient also has lower extremity edema which was also managed during today's clinic  visit.          PLAN:   We'll bandage the open areas with Adaptic, an ABD pad and a spandagrip stocking change once a day or every other day depending on the drainage.    Separate from the wound care instructions we then discussed management strategies for lower extremity edema.  I explained the keys for managing lower extremity edema are compression and elevation.  I have again explained to the patient today that controlling the edema is probably the most important thing we can do to help heal the wound.  I have specifically recommended that they lay down with their legs above the level of the heart for 30 minutes at least twice a day.  I emphasized that if we can not control the edema we will likely not be able to get this wound to heal.  We will send new orders back to the TCU asked them to help the patient elevate her legs as described above.  However the patient will also be going home soon and then her daughter will be able to help her to elevate her legs.    I have encouraged the patient to continue to maintain a high protein diet and to continue to take protein supplements to speed wound healing.   The patient will return to the wound clinic in 3-4 weeks to see me again.        30 minutes spent on the date of the encounter doing chart review, history and exam, documentation and further activities per the note, this time excludes any procedure time      Jaya Giordano MD  05/07/2024   11:36 AM   [facility] Vascular/Wound  [phone]    This note was electronically signed by Jaya Giordano MD        Further instructions from your care team         05/07/2024   Dayana    8/30/1924    A DME order was not completed because the supplies are ordered by home care or at a care facility     Dressing changes outside of clinic are being performed by care facility    [facility] TCU:  [phone]; fax [phone]  Discharging home ([facility])  "on Thursday     OK to shower. Remove dressings to shower, then do wound care after.     Wound Dressing Change: Right anterior lower leg, Right lateral lower leg, and Left lateral lower leg  - Cleanse with mild unscented soap and water (such as Cetaphil, Cerave or Dove)   - Apply thick, high-quality moisturizer to intact skin (such as CeraVe, Eucerin, Aquaphor or Vanicream)   - Primary dressing: Apply 1/4 piece of 3x3\" Adaptic to each leg (cut to fit the wound beds)   - Secondary dressing: Apply 1 gauze 2x2 or 4x4 to each wound  - Secure with 1/2 conforming roll gauze to each leg and secure with Medipore tape  - Pull up Spandagrip size E from toes to behind the knee  Change every other day until healed (no drainage), then leave open to air     Compression:   Your compression is Spandagrip E and can be removed at night and put back on first thing in the morning.   Please remove compression dressing if toes turn blue and/or tingle and can not be relieved by raising the leg for one hour. If unable to reapply in the morning, keep compression on until next dressing change.     Walk as much as you can, as you are able.      Elevate: your legs as much as possible. In addition, elevate legs above the level of your heart for 20-30 minutes: 2-3 times each day   Ways to do this:   - Lay on the couch or your bed and prop your legs up on pillows   - Recline back as far as you can go in your recliner and prop your legs on pillows.  **Avoid pressure to right lateral ankle** (Please use pillow under leg while sleeping to prevent pressure to this area)     Protein:  A diet high in protein is important for wound healing, we recommend getting up to 90 grams of protein per day. Taking protein shakes or bars are a good way to get extra protein in your diet.     Main Provider: Jaya Giordano M.D. May 7, 2024    Call us at [phone] if you have any questions about your wounds, if you have redness or swelling around your wound, have " a fever of 101 degrees Fahrenheit or greater or if you have any other problems or concerns. We answer the phone Monday through Friday 8 am to 4 pm, please leave a message as we check the voicemail frequently throughout the day. If you have a concern over the weekend, please leave a message and we will return your call Monday. If the need is urgent, go to the ER or urgent care.    If you had a positive experience please indicate that on your patient satisfaction survey form that [facility] will be sending you.    It was a pleasure meeting with you today.  Thank you for allowing me and my team the privilege of caring for you today.  YOU are the reason we are here, and I truly hope we provided you with the excellent service you deserve.  Please let us know if there is anything else we can do for you so that we can be sure you are leaving completely satisfied with your care experience.      If you have any billing related questions please call the [facility] Business office at [phone]. The clinic staff does not handle billing related matters.    If you are scheduled to have a follow up appointment, you will receive a reminder call the day before your visit. On the appointment day please arrive 15 minutes prior to your appointment time. If you are unable to keep that appointment, please call the clinic to cancel or reschedule. If you are more than 10 minutes late or greater for your scheduled appointment time, the clinic policy is that you may be asked to reschedule.        ,

## 2024-05-07 NOTE — DISCHARGE INSTRUCTIONS
"05/07/2024   Dayana Samano   8/30/1924    A DME order was not completed because the supplies are ordered by home care or at a care facility     Dressing changes outside of clinic are being performed by care facility    [facility] TCU:  [phone]; fax [phone]  Discharging home ([facility]) on Thursday     OK to shower. Remove dressings to shower, then do wound care after.     Wound Dressing Change: Right anterior lower leg, Right lateral lower leg, and Left lateral lower leg  - Cleanse with mild unscented soap and water (such as Cetaphil, Cerave or Dove)   - Apply thick, high-quality moisturizer to intact skin (such as CeraVe, Eucerin, Aquaphor or Vanicream)   - Primary dressing: Apply 1/4 piece of 3x3\" Adaptic to each leg (cut to fit the wound beds)   - Secondary dressing: Apply 1 gauze 2x2 or 4x4 to each wound  - Secure with 1/2 conforming roll gauze to each leg and secure with Medipore tape  - Pull up Spandagrip size E from toes to behind the knee  Change every other day until healed (no drainage), then leave open to air     Compression:   Your compression is Spandagrip E and can be removed at night and put back on first thing in the morning.   Please remove compression dressing if toes turn blue and/or tingle and can not be relieved by raising the leg for one hour. If unable to reapply in the morning, keep compression on until next dressing change.     Walk as much as you can, as you are able.      Elevate: your legs as much as possible. In addition, elevate legs above the level of your heart for 20-30 minutes: 2-3 times each day   Ways to do this:   - Lay on the couch or your bed and prop your legs up on pillows   - Recline back as far as you can go in your recliner and prop your legs on pillows.  **Avoid pressure to right lateral ankle** (Please use pillow under leg while sleeping to prevent pressure to this area)     Protein:  A diet high in protein is " important for wound healing, we recommend getting up to 90 grams of protein per day. Taking protein shakes or bars are a good way to get extra protein in your diet.     Main Provider: Jaya Giordano M.D. May 7, 2024    Call us at [phone] if you have any questions about your wounds, if you have redness or swelling around your wound, have a fever of 101 degrees Fahrenheit or greater or if you have any other problems or concerns. We answer the phone Monday through Friday 8 am to 4 pm, please leave a message as we check the voicemail frequently throughout the day. If you have a concern over the weekend, please leave a message and we will return your call Monday. If the need is urgent, go to the ER or urgent care.    If you had a positive experience please indicate that on your patient satisfaction survey form that [facility] will be sending you.    It was a pleasure meeting with you today.  Thank you for allowing me and my team the privilege of caring for you today.  YOU are the reason we are here, and I truly hope we provided you with the excellent service you deserve.  Please let us know if there is anything else we can do for you so that we can be sure you are leaving completely satisfied with your care experience.      If you have any billing related questions please call the [facility] Business office at [phone]. The clinic staff does not handle billing related matters.    If you are scheduled to have a follow up appointment, you will receive a reminder call the day before your visit. On the appointment day please arrive 15 minutes prior to your appointment time. If you are unable to keep that appointment, please call the clinic to cancel or reschedule. If you are more than 10 minutes late or greater for your scheduled appointment time, the clinic policy is that you may be asked to reschedule.

## 2024-05-08 ENCOUNTER — MEDICAL CORRESPONDENCE (OUTPATIENT)
Dept: HEALTH INFORMATION MANAGEMENT | Facility: CLINIC | Age: 89
End: 2024-05-08
Payer: MEDICARE

## 2024-05-09 DIAGNOSIS — I10 ESSENTIAL HYPERTENSION, BENIGN: ICD-10-CM

## 2024-05-09 DIAGNOSIS — I10 ESSENTIAL HYPERTENSION, BENIGN: Primary | ICD-10-CM

## 2024-05-09 RX ORDER — LISINOPRIL 20 MG/1
20 TABLET ORAL 2 TIMES DAILY
Qty: 60 TABLET | Refills: 0 | Status: SHIPPED | OUTPATIENT
Start: 2024-05-09 | End: 2024-07-02

## 2024-05-09 RX ORDER — AMLODIPINE BESYLATE 2.5 MG/1
2.5 TABLET ORAL DAILY
Qty: 30 TABLET | Refills: 0 | Status: SHIPPED | OUTPATIENT
Start: 2024-05-09 | End: 2024-05-17

## 2024-05-09 RX ORDER — LISINOPRIL 20 MG/1
20 TABLET ORAL 2 TIMES DAILY
Qty: 30 TABLET | Refills: 0 | Status: SHIPPED | OUTPATIENT
Start: 2024-05-09 | End: 2024-05-09

## 2024-05-09 NOTE — TELEPHONE ENCOUNTER
Daughter Marion (CTC on file) calls to request refills on medications that were changed in the hospital. She also states patient was told to have a hospital follow up but it is very difficult to get patient out for appointments. Scheduled a VV for hospital follow up with PCP. Will submit refill request to refill pool.    Future Appointments 5/9/2024 - 11/5/2024        Date Visit Type Length Department Provider     5/17/2024 11:00 AM ED/HOSP FOLLOW UP 30 min CR FAMILY PRACTICE Coming Elisha Mcdaniel MD    Location Instructions:     [facility] is located at [address] next to [employer] [address]. Free parking is available; access the lot by turning east from [facility] onto [address] [place] [address].              5/29/2024  2:40 PM RETURN WOUND PROVIDER 20 min SH WOUND HEALING INST Jaya Giordano MD    Location Instructions:     [facility] [place] is located at [address] [place]              8/5/2024  1:00 PM ANNUAL WELLNESS 30 min CR FAMILY PRACTICE Coming Elisha Mcdaniel MD    Location Instructions:     [facility] is located at [address] next to [employer] [address]. Free parking is available; access the lot by turning east from [facility] onto [address] [place] [address].              8/9/2024  1:00 PM [facility] MEDICARE ANNUAL WELLNESS 30 min CR FAMILY PRACTICE Coming Elisha Mcdaniel MD    Location Instructions:     [facility] is located at [address] next to [employer] [address] [address]. Free parking is available; access the lot by turning east from [facility] onto [address] [place] [address].              8/19/2024 11:30 AM [facility] MEDICARE ANNUAL WELLNESS 30 min CR FAMILY PRACTICE Coming Elisha Mcdaniel MD    Location Instructions:     [facility] is located at [address] next to [address] [employer] [address]. Free parking is available; access the lot by turning east from  [address].                       Brittney Dow RN on 5/9/2024 at 12:27 PM

## 2024-05-10 RX ORDER — AMLODIPINE BESYLATE 2.5 MG/1
2.5 TABLET ORAL DAILY
Qty: 90 TABLET | OUTPATIENT
Start: 2024-05-10

## 2024-05-11 ENCOUNTER — MEDICAL CORRESPONDENCE (OUTPATIENT)
Dept: HEALTH INFORMATION MANAGEMENT | Facility: CLINIC | Age: 89
End: 2024-05-11

## 2024-05-13 ENCOUNTER — MEDICAL CORRESPONDENCE (OUTPATIENT)
Dept: HEALTH INFORMATION MANAGEMENT | Facility: CLINIC | Age: 89
End: 2024-05-13
Payer: MEDICARE

## 2024-05-14 DIAGNOSIS — Z53.9 DIAGNOSIS NOT YET DEFINED: Primary | ICD-10-CM

## 2024-05-14 PROCEDURE — G0180 MD CERTIFICATION HHA PATIENT: HCPCS | Performed by: FAMILY MEDICINE

## 2024-05-15 ENCOUNTER — TELEPHONE (OUTPATIENT)
Dept: WOUND CARE | Facility: CLINIC | Age: 89
End: 2024-05-15
Payer: MEDICARE

## 2024-05-15 ENCOUNTER — MEDICAL CORRESPONDENCE (OUTPATIENT)
Dept: HEALTH INFORMATION MANAGEMENT | Facility: CLINIC | Age: 89
End: 2024-05-15
Payer: MEDICARE

## 2024-05-15 NOTE — TELEPHONE ENCOUNTER
Next Appointment:  11/12/21, cpe  Last seen:  10/30/20 for cpe    Last refill:  11/6/2020 #1 with 0 refills Filled by the ER     Returned call to Marion. Discussed that Dr. Giordano recommends elevating the legs but certainly the patient and family can decide not to follow those recommendation. Marion states that in order for the facility to stop elevating the legs they would need an order to stop. Discussed with Dr. Giordano who recommends continuing to elevate the legs and will not provide an order to stop. Marion verbalized understanding.

## 2024-05-15 NOTE — TELEPHONE ENCOUNTER
Patient's daughter Marion called, patient is getting care at her care center.  Wondering if she can stop elevating her legs, as that is two more times for the nurses to come in.  Marion

## 2024-05-17 ENCOUNTER — VIRTUAL VISIT (OUTPATIENT)
Dept: FAMILY MEDICINE | Facility: CLINIC | Age: 89
End: 2024-05-17
Payer: MEDICARE

## 2024-05-17 DIAGNOSIS — Z09 FOLLOW-UP EXAM: Primary | ICD-10-CM

## 2024-05-17 DIAGNOSIS — R60.0 LOWER EXTREMITY EDEMA: ICD-10-CM

## 2024-05-17 DIAGNOSIS — M62.81 GENERALIZED MUSCLE WEAKNESS: ICD-10-CM

## 2024-05-17 DIAGNOSIS — S22.49XD CLOSED FRACTURE OF MULTIPLE RIBS WITH ROUTINE HEALING, UNSPECIFIED LATERALITY, SUBSEQUENT ENCOUNTER: ICD-10-CM

## 2024-05-17 DIAGNOSIS — N18.9 ACUTE RENAL FAILURE SUPERIMPOSED ON CHRONIC KIDNEY DISEASE, UNSPECIFIED ACUTE RENAL FAILURE TYPE, UNSPECIFIED CKD STAGE (H): ICD-10-CM

## 2024-05-17 DIAGNOSIS — S81.801D UNSPECIFIED OPEN WOUND, RIGHT LOWER LEG, SUBSEQUENT ENCOUNTER: ICD-10-CM

## 2024-05-17 DIAGNOSIS — S81.802D UNSPECIFIED OPEN WOUND, LEFT LOWER LEG, SUBSEQUENT ENCOUNTER: ICD-10-CM

## 2024-05-17 DIAGNOSIS — I10 ESSENTIAL HYPERTENSION, BENIGN: ICD-10-CM

## 2024-05-17 DIAGNOSIS — N17.9 ACUTE RENAL FAILURE SUPERIMPOSED ON CHRONIC KIDNEY DISEASE, UNSPECIFIED ACUTE RENAL FAILURE TYPE, UNSPECIFIED CKD STAGE (H): ICD-10-CM

## 2024-05-17 PROBLEM — D03.9 MELANOMA IN SITU (H): Status: ACTIVE | Noted: 2024-05-17

## 2024-05-17 PROCEDURE — 99214 OFFICE O/P EST MOD 30 MIN: CPT | Mod: 95 | Performed by: FAMILY MEDICINE

## 2024-05-17 PROCEDURE — G2211 COMPLEX E/M VISIT ADD ON: HCPCS | Mod: 95 | Performed by: FAMILY MEDICINE

## 2024-05-17 RX ORDER — AMLODIPINE BESYLATE 2.5 MG/1
2.5 TABLET ORAL DAILY
Qty: 90 TABLET | Refills: 3 | Status: SHIPPED | OUTPATIENT
Start: 2024-05-17

## 2024-05-17 RX ORDER — POTASSIUM CHLORIDE 1500 MG/1
20 TABLET, EXTENDED RELEASE ORAL DAILY
Qty: 90 TABLET | Refills: 3 | Status: SHIPPED | OUTPATIENT
Start: 2024-05-17

## 2024-05-17 RX ORDER — ACETAMINOPHEN 325 MG/1
325-650 TABLET ORAL EVERY 6 HOURS PRN
COMMUNITY

## 2024-05-17 RX ORDER — POTASSIUM CHLORIDE 1500 MG/1
TABLET, EXTENDED RELEASE ORAL
COMMUNITY
Start: 2023-08-02 | End: 2024-05-17

## 2024-05-17 NOTE — PROGRESS NOTES
Dayana is a 89 year old who is being evaluated via a billable video visit.    How would you like to obtain your AVS? MyChart  If the video visit is dropped, the invitation should be resent by: Text to cell phone: [phone]  Will anyone else be joining your video visit? Yes: daughter Marion. How would they like to receive their invitation? Text to cell phone: [phone]      Assessment & Plan     Follow-up exam  Home now from TCU.  Doing well without concerns.  Has Home Care, PT, and OT helping her with regaining her independence and strength.    Closed fracture of multiple ribs with routine healing, unspecified laterality, subsequent encounter  No pain, no concerns.  Has Acetaminophen PRN if needed, but has not needed.    Acute renal failure superimposed on chronic kidney disease, unspecified acute renal failure type, unspecified CKD stage  (H24)  Resolved.  Will monitor labs as needed, last GFR 62 (stage 2 CKD).    Essential hypertension, benign  Labile per records, no reading available today.  Will have her continue the amlodipine daily and continue the Lisinopril 20 mg BID.  [facility], [facility] have a large supply of 40 mg tabs they would like to use up first.  - amLODIPine (NORVASC) 2.5 MG tablet; Take 1 tablet (2.5 mg) by mouth daily  - potassium chloride patsy ER (KLOR-CON M20) 20 MEQ CR tablet; Take 1 tablet (20 mEq) by mouth daily    Lower extremity edema  On Furosemide and potassium.  Working with wound care, who advises elevation of legs is essential to wound healing.    Unspecified open wound, left lower leg, subsequent encounter  Wounds nearly healed per last visit note, continue with elevation and wound care as advised    Unspecified open wound, right lower leg, subsequent encounter  As above    Generalized muscle weakness  Continue with home PT and OT.    The longitudinal plan of care for the diagnosis(es)/condition(s) as documented were addressed during this visit. Due to the  added complexity in care, I will continue to support Dayana in the subsequent management and with ongoing continuity of care.      MED REC REQUIRED  Post Medication Reconciliation Status: discharge medications reconciled, continue medications without change    See Patient Instructions    Subjective   Dayana is a 89 year old, presenting for the following health issues:  Hospital F/U        5/17/2024    10:55 AM   Additional Questions   Roomed by pablo larose   Accompanied by daughter     Video Start Time: 11:11 AM    [facility]       Hospital Follow-up Visit:    Hospital/Nursing Home/IP Rehab Facility: [facility]  Date of Admission: 4/9/2024  Date of Discharge: 4/12/2024  Reason(s) for Admission: Acute renal failure superimposed on chronic kidney disease, unspecified acute renal failure type, unspecified CKD stage  (H24)   Was the patient in the ICU or did the patient experience delirium during hospitalization?  No  Do you have any other stressors you would like to discuss with your provider? No    Problems taking medications regularly:  None  Medication changes since discharge: None  Problems adhering to non-medication therapy:  None    Summary of hospitalization:  [facility] discharge summary reviewed  Diagnostic Tests/Treatments reviewed.  Follow up needed: none  Other Healthcare Providers Involved in Patient s Care:         None  Update since discharge: improved.         Plan of care communicated with patient and family     She was in SNF for 3 weeks, has Home care, PT, OT now.  Pain is okay, but has weakness from being less active.    Seeing wound clinic for her legs.    Current Outpatient Medications   Medication Sig Dispense Refill    acetaminophen (TYLENOL) 325 MG tablet Take 325-650 mg by mouth every 6 hours as needed for mild pain      amLODIPine (NORVASC) 2.5 MG tablet Take 1 tablet (2.5 mg) by mouth daily 90 tablet 3    aspirin (ASA) 325 MG tablet Take 325 mg by mouth daily       CALCIUM 600 + D 600-200 MG-UNIT OR TABS Take 1 tablet by mouth daily  3 MONTHS 1 YEAR    calcium carbonate (TUMS) 500 MG chewable tablet Take 1 tablet (500 mg) by mouth 2 times daily      furosemide (LASIX) 40 MG tablet Take 1 tablet (40 mg) by mouth daily 90 tablet 3    ketoconazole (NIZORAL) 2 % external shampoo daily as needed for itching or irritation      latanoprost (XALATAN) 0.005 % ophthalmic solution Place 1 drop into both eyes at bedtime      lisinopril (ZESTRIL) 20 MG tablet Take 1 tablet (20 mg) by mouth 2 times daily 60 tablet 0    metoprolol tartrate (LOPRESSOR) 100 MG tablet Take 1 tablet (100 mg) by mouth 2 times daily 180 tablet 3    multivitamin (OCUVITE) TABS tablet Take 1 tablet by mouth daily      polyethylene glycol 400 (BLINK TEARS) 0.25 % SOLN ophthalmic solution Place 1 drop into both eyes 2 times daily as needed for dry eyes      potassium chloride patsy ER (KLOR-CON M20) 20 MEQ CR tablet Take 1 tablet (20 mEq) by mouth daily 90 tablet 3    timolol maleate (TIMOPTIC) 0.5 % ophthalmic solution Place 1 drop into both eyes every morning               Objective    Vitals - Patient Reported  Height (Patient Reported): 152.4 cm (5')  Pain Score: No Pain (0)        Physical Exam   GENERAL: alert and no distress  EYES: Eyes grossly normal to inspection.  No discharge or erythema, or obvious scleral/conjunctival abnormalities.  RESP: No audible wheeze, cough, or visible cyanosis.    SKIN: Visible skin clear. No significant rash, abnormal pigmentation or lesions.  NEURO: Cranial nerves grossly intact.  Mentation and speech appropriate for age.  PSYCH: Appropriate affect, tone, and pace of words    Labs reviewed in chart      Video-Visit Details    Type of service:  Video Visit   Video End Time:11:20 AM  Originating Location (pt. Location): Home    Distant Location (provider location):  Off-site  Platform used for Video Visit: Saeed  Signed Electronically by: Elisha Mcdaniel MD

## 2024-05-17 NOTE — PROGRESS NOTES
"Dayana is a 89 year old who is being evaluated via a billable video visit.    {ROOMING STAFF complete during rooming of virtual visit (Optional):624694}  {If patient encounters technical issues they should call [phone] :260387}    {PROVIDER CHARTING PREFERENCE:902295}    Subjective   Dayana is a 89 year old, presenting for the following health issues:  Hospital F/U      5/17/2024    10:52 AM   Additional Questions   Roomed by Irma Ray     Video Start Time: {video visit start/end time for provider to select:389309}    HPI     {SUPERLIST (Optional):828209}  {additonal problems for provider to add (Optional):010009}    {ROS Picklists (Optional):152120}      Objective           Vitals:  No vitals were obtained today due to virtual visit.    Physical Exam   {video visit exam brief selected:692055}    {Diagnostic Test Results (Optional):372019}      Video-Visit Details    Type of service:  Video Visit   Video End Time:{video visit start/end time for provider to select:264880}  Originating Location (pt. Location): {video visit patient location:664471::\"Home\"}  {PROVIDER LOCATION On-site should be selected for visits conducted from your clinic location or adjoining [facility] hospital, academic office, or other nearby [facility] building. Off-site should be selected for all other provider locations, including home:866600}  Distant Location (provider location):  {virtual location provider:224911}  Platform used for Video Visit: {Virtual Visit Platforms:942901::\"Newsblur\"}  Signed Electronically by: Elisha Mcdaniel MD  {Email feedback regarding this note to [email or web address]   :805171}  "

## 2024-05-21 ENCOUNTER — MEDICAL CORRESPONDENCE (OUTPATIENT)
Dept: HEALTH INFORMATION MANAGEMENT | Facility: CLINIC | Age: 89
End: 2024-05-21
Payer: MEDICARE

## 2024-05-22 ENCOUNTER — TELEPHONE (OUTPATIENT)
Dept: FAMILY MEDICINE | Facility: CLINIC | Age: 89
End: 2024-05-22
Payer: MEDICARE

## 2024-05-22 NOTE — TELEPHONE ENCOUNTER
Routed to Elisha Fernandez-MD Jonas, please advise OT order, route to inform     Home Care is calling regarding an established patient with [facility].       Requesting orders from: Elisha Gaona  Provider is following patient: Yes  Is this a 60-day recertification request?  No    Orders Requested    Occupational Therapy  Request for initial certification (first set of orders)   Frequency:  1x/wk for 3 wks  then 0x/wk for 1 wks and 1 wk x 1 week, starting next week       Information was gathered and will be sent to provider for review.      RN will contact Home Care with information after provider review.  Confirmed ok to leave a detailed message with call back.  Contact information confirmed and updated as needed.    Marion Win RN

## 2024-05-23 ENCOUNTER — MEDICAL CORRESPONDENCE (OUTPATIENT)
Dept: HEALTH INFORMATION MANAGEMENT | Facility: CLINIC | Age: 89
End: 2024-05-23
Payer: MEDICARE

## 2024-05-30 ENCOUNTER — TELEPHONE (OUTPATIENT)
Dept: FAMILY MEDICINE | Facility: CLINIC | Age: 89
End: 2024-05-30
Payer: MEDICARE

## 2024-05-30 NOTE — TELEPHONE ENCOUNTER
Home Care is calling regarding an established patient with [facility].  {    Requesting orders from: Elisha Gaona  Provider is following patient: Yes  Is this a 60-day recertification request?  No    Orders Requested    Skilled Nursing  Request for continuation of care with increase in frequency  Frequency:  1 additional visit       Skilled Nursing  Request for discontinuation of care   Goals have been met/progressing.  After visit - discontinue nursing care wounds are healed      Information was gathered and will be sent to provider for review.  RN will contact Home Care with information after provider review.  Confirmed ok to leave a detailed message with call back.  Contact information confirmed and updated as needed.    Janet Alvarez RN

## 2024-06-03 ENCOUNTER — MEDICAL CORRESPONDENCE (OUTPATIENT)
Dept: HEALTH INFORMATION MANAGEMENT | Facility: CLINIC | Age: 89
End: 2024-06-03
Payer: MEDICARE

## 2024-06-25 ENCOUNTER — MEDICAL CORRESPONDENCE (OUTPATIENT)
Dept: HEALTH INFORMATION MANAGEMENT | Facility: CLINIC | Age: 89
End: 2024-06-25
Payer: MEDICARE

## 2024-07-02 ENCOUNTER — MYC REFILL (OUTPATIENT)
Dept: FAMILY MEDICINE | Facility: CLINIC | Age: 89
End: 2024-07-02
Payer: MEDICARE

## 2024-07-02 DIAGNOSIS — I10 ESSENTIAL HYPERTENSION, BENIGN: ICD-10-CM

## 2024-07-02 RX ORDER — LISINOPRIL 20 MG/1
20 TABLET ORAL 2 TIMES DAILY
Qty: 180 TABLET | Refills: 3 | Status: SHIPPED | OUTPATIENT
Start: 2024-07-02

## 2024-08-16 ENCOUNTER — PATIENT OUTREACH (OUTPATIENT)
Dept: CARE COORDINATION | Facility: CLINIC | Age: 89
End: 2024-08-16
Payer: MEDICARE

## 2024-08-19 ENCOUNTER — OFFICE VISIT (OUTPATIENT)
Dept: FAMILY MEDICINE | Facility: CLINIC | Age: 89
End: 2024-08-19
Attending: FAMILY MEDICINE
Payer: MEDICARE

## 2024-08-19 VITALS
HEIGHT: 60 IN | TEMPERATURE: 98 F | HEART RATE: 65 BPM | SYSTOLIC BLOOD PRESSURE: 116 MMHG | BODY MASS INDEX: 22.97 KG/M2 | DIASTOLIC BLOOD PRESSURE: 71 MMHG | WEIGHT: 117 LBS | OXYGEN SATURATION: 97 % | RESPIRATION RATE: 18 BRPM

## 2024-08-19 DIAGNOSIS — N18.31 STAGE 3A CHRONIC KIDNEY DISEASE (H): Primary | ICD-10-CM

## 2024-08-19 DIAGNOSIS — Z00.00 ENCOUNTER FOR MEDICARE ANNUAL WELLNESS EXAM: ICD-10-CM

## 2024-08-19 DIAGNOSIS — I10 ESSENTIAL HYPERTENSION, BENIGN: ICD-10-CM

## 2024-08-19 DIAGNOSIS — D03.9 MELANOMA IN SITU, UNSPECIFIED SITE (H): ICD-10-CM

## 2024-08-19 DIAGNOSIS — I50.9 CHRONIC CONGESTIVE HEART FAILURE, UNSPECIFIED HEART FAILURE TYPE (H): ICD-10-CM

## 2024-08-19 PROBLEM — I95.9 HYPOTENSION, UNSPECIFIED HYPOTENSION TYPE: Status: RESOLVED | Noted: 2024-05-06 | Resolved: 2024-08-19

## 2024-08-19 PROBLEM — R06.02 SHORTNESS OF BREATH: Status: RESOLVED | Noted: 2024-04-09 | Resolved: 2024-08-19

## 2024-08-19 PROBLEM — S22.21XD: Status: RESOLVED | Noted: 2024-04-15 | Resolved: 2024-08-19

## 2024-08-19 PROBLEM — S81.801A LEG WOUND, RIGHT: Status: RESOLVED | Noted: 2024-04-09 | Resolved: 2024-08-19

## 2024-08-19 PROBLEM — S81.801D UNSPECIFIED OPEN WOUND, RIGHT LOWER LEG, SUBSEQUENT ENCOUNTER: Status: RESOLVED | Noted: 2024-04-15 | Resolved: 2024-08-19

## 2024-08-19 PROBLEM — S81.802A LEG WOUND, LEFT: Status: RESOLVED | Noted: 2024-04-09 | Resolved: 2024-08-19

## 2024-08-19 PROBLEM — N17.9 ACUTE RENAL FAILURE, UNSPECIFIED ACUTE RENAL FAILURE TYPE (H): Status: RESOLVED | Noted: 2024-04-15 | Resolved: 2024-08-19

## 2024-08-19 PROBLEM — S81.802D UNSPECIFIED OPEN WOUND, LEFT LOWER LEG, SUBSEQUENT ENCOUNTER: Status: RESOLVED | Noted: 2024-04-15 | Resolved: 2024-08-19

## 2024-08-19 PROBLEM — S22.41XD MULTIPLE FRACTURES OF RIBS, RIGHT SIDE, SUBSEQUENT ENCOUNTER FOR FRACTURE WITH ROUTINE HEALING: Status: RESOLVED | Noted: 2024-04-15 | Resolved: 2024-08-19

## 2024-08-19 LAB
CREAT UR-MCNC: 66.8 MG/DL
MICROALBUMIN UR-MCNC: 47.4 MG/L
MICROALBUMIN/CREAT UR: 70.96 MG/G CR (ref 0–25)

## 2024-08-19 PROCEDURE — 99214 OFFICE O/P EST MOD 30 MIN: CPT | Mod: 25 | Performed by: FAMILY MEDICINE

## 2024-08-19 PROCEDURE — 82570 ASSAY OF URINE CREATININE: CPT | Performed by: FAMILY MEDICINE

## 2024-08-19 PROCEDURE — G0439 PPPS, SUBSEQ VISIT: HCPCS | Performed by: FAMILY MEDICINE

## 2024-08-19 PROCEDURE — 82043 UR ALBUMIN QUANTITATIVE: CPT | Performed by: FAMILY MEDICINE

## 2024-08-19 RX ORDER — AMOXICILLIN 500 MG/1
TABLET, FILM COATED ORAL
COMMUNITY
Start: 2024-07-08

## 2024-08-19 RX ORDER — FUROSEMIDE 40 MG
40 TABLET ORAL DAILY
Qty: 90 TABLET | Refills: 3 | Status: SHIPPED | OUTPATIENT
Start: 2024-08-19

## 2024-08-19 RX ORDER — METOPROLOL TARTRATE 100 MG
100 TABLET ORAL 2 TIMES DAILY
Qty: 180 TABLET | Refills: 3 | Status: SHIPPED | OUTPATIENT
Start: 2024-08-19

## 2024-08-19 SDOH — HEALTH STABILITY: PHYSICAL HEALTH: ON AVERAGE, HOW MANY DAYS PER WEEK DO YOU ENGAGE IN MODERATE TO STRENUOUS EXERCISE (LIKE A BRISK WALK)?: 7 DAYS

## 2024-08-19 SDOH — HEALTH STABILITY: PHYSICAL HEALTH: ON AVERAGE, HOW MANY MINUTES DO YOU ENGAGE IN EXERCISE AT THIS LEVEL?: PATIENT DECLINED

## 2024-08-19 ASSESSMENT — SOCIAL DETERMINANTS OF HEALTH (SDOH): HOW OFTEN DO YOU GET TOGETHER WITH FRIENDS OR RELATIVES?: MORE THAN THREE TIMES A WEEK

## 2024-08-19 NOTE — PATIENT INSTRUCTIONS
Patient Education   Preventive Care Advice   This is general advice given by our system to help you stay healthy. However, your care team may have specific advice just for you. Please talk to your care team about your preventive care needs.  Nutrition  Eat 5 or more servings of fruits and vegetables each day.  Try wheat bread, brown rice and whole grain pasta (instead of white bread, rice, and pasta).  Get enough calcium and vitamin D. Check the label on foods and aim for 100% of the RDA (recommended daily allowance).  Lifestyle  Exercise at least 150 minutes each week  (30 minutes a day, 5 days a week).  Do muscle strengthening activities 2 days a week. These help control your weight and prevent disease.  No smoking.  Wear sunscreen to prevent skin cancer.  Have a dental exam and cleaning every 6 months.  Yearly exams  See your health care team every year to talk about:  Any changes in your health.  Any medicines your care team has prescribed.  Preventive care, family planning, and ways to prevent chronic diseases.  Shots (vaccines)   HPV shots (up to age 26), if you've never had them before.  Hepatitis B shots (up to age 59), if you've never had them before.  COVID-19 shot: Get this shot when it's due.  Flu shot: Get a flu shot every year.  Tetanus shot: Get a tetanus shot every 10 years.  Pneumococcal, hepatitis A, and RSV shots: Ask your care team if you need these based on your risk.  Shingles shot (for age 50 and up)  General health tests  Diabetes screening:  Starting at age 35, Get screened for diabetes at least every 3 years.  If you are younger than age 35, ask your care team if you should be screened for diabetes.  Cholesterol test: At age 39, start having a cholesterol test every 5 years, or more often if advised.  Bone density scan (DEXA): At age 50, ask your care team if you should have this scan for osteoporosis (brittle bones).  Hepatitis C: Get tested at least once in your life.  STIs (sexually  transmitted infections)  Before age 24: Ask your care team if you should be screened for STIs.  After age 24: Get screened for STIs if you're at risk. You are at risk for STIs (including HIV) if:  You are sexually active with more than one person.  You don't use condoms every time.  You or a partner was diagnosed with a sexually transmitted infection.  If you are at risk for HIV, ask about PrEP medicine to prevent HIV.  Get tested for HIV at least once in your life, whether you are at risk for HIV or not.  Cancer screening tests  Cervical cancer screening: If you have a cervix, begin getting regular cervical cancer screening tests starting at age 21.  Breast cancer scan (mammogram): If you've ever had breasts, begin having regular mammograms starting at age 40. This is a scan to check for breast cancer.  Colon cancer screening: It is important to start screening for colon cancer at age 45.  Have a colonoscopy test every 10 years (or more often if you're at risk) Or, ask your provider about stool tests like a FIT test every year or Cologuard test every 3 years.  To learn more about your testing options, visit:   .  For help making a decision, visit:   https://bit.ly/hz92857.  Prostate cancer screening test: If you have a prostate, ask your care team if a prostate cancer screening test (PSA) at age 55 is right for you.  Lung cancer screening: If you are a current or former smoker ages 50 to 80, ask your care team if ongoing lung cancer screenings are right for you.  For informational purposes only. Not to replace the advice of your health care provider. Copyright   2023 [facility]. All rights reserved. Clinically reviewed by the [facility]. [employer] 580331 - REV 01/24.  Learning About Activities of Daily Living  What are activities of daily living?     Activities of daily living (ADLs) are the basic self-care tasks you do every day. These include eating, bathing, dressing,  and moving around.  As you age, and if you have health problems, you may find that it's harder to do some of these tasks. If so, your doctor can suggest ideas that may help.  To measure what kind of help you may need, your doctor will ask how well you are able to do ADLs. Let your doctor know if there are any tasks that you are having trouble doing. This is an important first step to getting help. And when you have the help you need, you can stay as independent as possible.  How will a doctor assess your ADLs?  Asking about ADLs is part of a routine health checkup your doctor will likely do as you age. Your health check might be done in a doctor's office, in your home, or at a hospital. The goal is to find out if you are having any problems that could make it hard to care for yourself or that make it unsafe for you to be on your own.  To measure your ADLs, your doctor will ask how hard it is for you to do routine tasks. Your doctor may also want to know if you have changed the way you do a task because of a health problem. Your doctor may watch how you:  Walk back and forth.  Keep your balance while you stand or walk.  Move from sitting to standing or from a bed to a chair.  Button or unbutton a shirt or sweater.  Remove and put on your shoes.  It's common to feel a little worried or anxious if you find you can't do all the things you used to be able to do. Talking with your doctor about ADLs is a way to make sure you're as safe as possible and able to care for yourself as well as you can. You may want to bring a caregiver, friend, or family member to your checkup. They can help you talk to your doctor.  Follow-up care is a key part of your treatment and safety. Be sure to make and go to all appointments, and call your doctor if you are having problems. It's also a good idea to know your test results and keep a list of the medicines you take.  Current as of: October 24, 2023  Content Version: 14.1    8037-8042  Healthwise, [employer].   Care instructions adapted under license by your healthcare professional. If you have questions about a medical condition or this instruction, always ask your healthcare professional. [employer] disclaims any warranty or liability for your use of this information.    Preventing Falls: Care Instructions  Injuries and health problems such as trouble walking or poor eyesight can increase your risk of falling. So can some medicines. But there are things you can do to help prevent falls. You can exercise to get stronger. You can also arrange your home to make it safer.    Talk to your doctor about the medicines you take. Ask if any of them increase the risk of falls and whether they can be changed or stopped.   Try to exercise regularly. It can help improve your strength and balance. This can help lower your risk of falling.     Practice fall safety and prevention.    Wear low-heeled shoes that fit well and give your feet good support. Talk to your doctor if you have foot problems that make this hard.  Carry a cellphone or wear a medical alert device that you can use to call for help.  Use stepladders instead of chairs to reach high objects. Don't climb if you're at risk for falls. Ask for help, if needed.  Wear the correct eyeglasses, if you need them.    Make your home safer.    Remove rugs, cords, clutter, and furniture from walkways.  Keep your house well lit. Use night-lights in hallways and bathrooms.  Install and use sturdy handrails on stairways.  Wear nonskid footwear, even inside. Don't walk barefoot or in socks without shoes.    Be safe outside.    Use handrails, curb cuts, and ramps whenever possible.  Keep your hands free by using a shoulder bag or backpack.  Try to walk in well-lit areas. Watch out for uneven ground, changes in pavement, and debris.  Be careful in the winter. Walk on the grass or gravel when sidewalks are slippery. Use de-icer on steps and walkways.  "Add non-slip devices to shoes.    Put grab bars and nonskid mats in your shower or tub and near the toilet. Try to use a shower chair or bath bench when bathing.   Get into a tub or shower by putting in your weaker leg first. Get out with your strong side first. Have a phone or medical alert device in the bathroom with you.   Where can you learn more?  Go to https://www.[employer]/patiented  Enter G117 in the search box to learn more about \"Preventing Falls: Care Instructions.\"  Current as of: July 17, 2023               Content Version: 14.0    6624-8655 [employer].   Care instructions adapted under license by your healthcare professional. If you have questions about a medical condition or this instruction, always ask your healthcare professional. [employer] disclaims any warranty or liability for your use of this information.      Hearing Loss: Care Instructions  Overview     Hearing loss is a sudden or slow decrease in how well you hear. It can range from slight to profound. Permanent hearing loss can occur with aging. It also can happen when you are exposed long-term to loud noise. Examples include listening to loud music, riding motorcycles, or being around other loud machines.  Hearing loss can affect your work and home life. It can make you feel lonely or depressed. You may feel that you have lost your independence. But hearing aids and other devices can help you hear better and feel connected to others.  Follow-up care is a key part of your treatment and safety. Be sure to make and go to all appointments, and call your doctor if you are having problems. It's also a good idea to know your test results and keep a list of the medicines you take.  How can you care for yourself at home?  Avoid loud noises whenever possible. This helps keep your hearing from getting worse.  Always wear hearing protection around loud noises.  Wear a hearing aid as directed.  A professional can help " "you pick a hearing aid that will work best for you.  You can also get hearing aids over the counter for mild to moderate hearing loss.  Have hearing tests as your doctor suggests. They can show whether your hearing has changed. Your hearing aid may need to be adjusted.  Use other devices as needed. These may include:  Telephone amplifiers and hearing aids that can connect to a television, stereo, radio, or microphone.  Devices that use lights or vibrations. These alert you to the doorbell, a ringing telephone, or a baby monitor.  Television closed-captioning. This shows the words at the bottom of the screen. Most new TVs can do this.  TTY (text telephone). This lets you type messages back and forth on the telephone instead of talking or listening. These devices are also called TDD. When messages are typed on the keyboard, they are sent over the phone line to a receiving TTY. The message is shown on a monitor.  Use text messaging, social media, and email if it is hard for you to communicate by telephone.  Try to learn a listening technique called speechreading. It is not lipreading. You pay attention to people's gestures, expressions, posture, and tone of voice. These clues can help you understand what a person is saying. Face the person you are talking to, and have them face you. Make sure the lighting is good. You need to see the other person's face clearly.  Think about counseling if you need help to adjust to your hearing loss.  When should you call for help?  Watch closely for changes in your health, and be sure to contact your doctor if:    You think your hearing is getting worse.     You have new symptoms, such as dizziness or nausea.   Where can you learn more?  Go to https://www.[employer].net/patiented  Enter R798 in the search box to learn more about \"Hearing Loss: Care Instructions.\"  Current as of: September 27, 2023               Content Version: 14.0    2706-4535 [employer].   Care " instructions adapted under license by your healthcare professional. If you have questions about a medical condition or this instruction, always ask your healthcare professional. Healthwise, [facility] disclaims any warranty or liability for your use of this information.      Learning About Sleeping Well  What does sleeping well mean?     Sleeping well means getting enough sleep to feel good and stay healthy. How much sleep is enough varies among people.  The number of hours you sleep and how you feel when you wake up are both important. If you do not feel refreshed, you probably need more sleep. Another sign of not getting enough sleep is feeling tired during the day.  Experts recommend that adults get at least 7 or more hours of sleep per day. Children and older adults need more sleep.  Why is getting enough sleep important?  Getting enough quality sleep is a basic part of good health. When your sleep suffers, your physical health, mood, and your thoughts can suffer too. You may find yourself feeling more grumpy or stressed. Not getting enough sleep also can lead to serious problems, including injury, accidents, anxiety, and depression.  What might cause poor sleeping?  Many things can cause sleep problems, including:  Changes to your sleep schedule.  Stress. Stress can be caused by fear about a single event, such as giving a speech. Or you may have ongoing stress, such as worry about work or school.  Depression, anxiety, and other mental or emotional conditions.  Changes in your sleep habits or surroundings. This includes changes that happen where you sleep, such as noise, light, or sleeping in a different bed. It also includes changes in your sleep pattern, such as having jet lag or working a late shift.  Health problems, such as pain, breathing problems, and restless legs syndrome.  Lack of regular exercise.  Using alcohol, nicotine, or caffeine before bed.  How can you help yourself?  Here are some tips that  "may help you sleep more soundly and wake up feeling more refreshed.  Your sleeping area   Use your bedroom only for sleeping and sex. A bit of light reading may help you fall asleep. But if it doesn't, do your reading elsewhere in the house. Try not to use your TV, computer, smartphone, or tablet while you are in bed.  Be sure your bed is big enough to stretch out comfortably, especially if you have a sleep partner.  Keep your bedroom quiet, dark, and cool. Use curtains, blinds, or a sleep mask to block out light. To block out noise, use earplugs, soothing music, or a \"white noise\" machine.  Your evening and bedtime routine   Create a relaxing bedtime routine. You might want to take a warm shower or bath, or listen to soothing music.  Go to bed at the same time every night. And get up at the same time every morning, even if you feel tired.  What to avoid   Limit caffeine (coffee, tea, caffeinated sodas) during the day, and don't have any for at least 6 hours before bedtime.  Avoid drinking alcohol before bedtime. Alcohol can cause you to wake up more often during the night.  Try not to smoke or use tobacco, especially in the evening. Nicotine can keep you awake.  Limit naps during the day, especially close to bedtime.  Avoid lying in bed awake for too long. If you can't fall asleep or if you wake up in the middle of the night and can't get back to sleep within about 20 minutes, get out of bed and go to another room until you feel sleepy.  Avoid taking medicine right before bed that may keep you awake or make you feel hyper or energized. Your doctor can tell you if your medicine may do this and if you can take it earlier in the day.  If you can't sleep   Imagine yourself in a peaceful, pleasant scene. Focus on the details and feelings of being in a place that is relaxing.  Get up and do a quiet or boring activity until you feel sleepy.  Avoid drinking any liquids before going to bed to help prevent waking up often to " "use the bathroom.  Where can you learn more?  Go to https://www.[employer].net/patiented  Enter J942 in the search box to learn more about \"Learning About Sleeping Well.\"  Current as of: July 10, 2023  Content Version: 14.1    6682-1476 [employer].   Care instructions adapted under license by your healthcare professional. If you have questions about a medical condition or this instruction, always ask your healthcare professional. [employer] disclaims any warranty or liability for your use of this information.    Learning About Depression Screening  What is depression screening?  Depression screening is a way to see if you have depression symptoms. It may be done by a doctor or counselor. It's often part of a routine checkup. That's because your mental health is just as important as your physical health.  Depression is a mental health condition that affects how you feel, think, and act. You may:  Have less energy.  Lose interest in your daily activities.  Feel sad and grouchy for a long time.  Depression is very common. It affects people of all ages.  Many things can lead to depression. Some people become depressed after they have a stroke or find out they have a major illness like cancer or heart disease. The death of a loved one or a breakup may lead to depression. It can run in families. Most experts believe that a combination of inherited genes and stressful life events can cause it.  What happens during screening?  You may be asked to fill out a form about your depression symptoms. You and the doctor will discuss your answers. The doctor may ask you more questions to learn more about how you think, act, and feel.  What happens after screening?  If you have symptoms of depression, your doctor will talk to you about your options.  Doctors usually treat depression with medicines or counseling. Often, combining the two works best. Many people don't get help because they think that they'll " "get over the depression on their own. But people with depression may not get better unless they get treatment.  The cause of depression is not well understood. There may be many factors involved. But if you have depression, it's not your fault.  A serious symptom of depression is thinking about death or suicide. If you or someone you care about talks about this or about feeling hopeless, get help right away.  It's important to know that depression can be treated. Medicine, counseling, and self-care may help.  Where can you learn more?  Go to https://www.[employer].net/patiented  Enter T185 in the search box to learn more about \"Learning About Depression Screening.\"  Current as of: June 24, 2023  Content Version: 14.1 2006-2024 [employer].   Care instructions adapted under license by your healthcare professional. If you have questions about a medical condition or this instruction, always ask your healthcare professional. [employer] disclaims any warranty or liability for your use of this information.       "

## 2024-08-19 NOTE — PROGRESS NOTES
Preventive Care Visit  [facility]  April EDWIN Mcdaniel MD, Family Medicine  Aug 19, 2024      Assessment & Plan     Encounter for Medicare annual wellness exam  Exam completed today, routine health maintenance items updated as able.  Labs ordered.  Follow up one year or sooner as needed.  Recommend RSV and COVID vaccines this fall.    Chronic congestive heart failure, unspecified heart failure type (H)  Stable, continue current medications.  - furosemide (LASIX) 40 MG tablet; Take 1 tablet (40 mg) by mouth daily  - metoprolol tartrate (LOPRESSOR) 100 MG tablet; Take 1 tablet (100 mg) by mouth 2 times daily    Essential hypertension, benign  Controlled, continue current medications.  - metoprolol tartrate (LOPRESSOR) 100 MG tablet; Take 1 tablet (100 mg) by mouth 2 times daily    Melanoma in situ, unspecified site (H)  Working with Dermatology, seen Q3 months.    Stage 3a chronic kidney disease (H)  Due for labs, recommendations pending results.  - Albumin Random Urine Quantitative with Creat Ratio; Future  - Albumin Random Urine Quantitative with Creat Ratio    Counseling  Appropriate preventive services were addressed with this patient via screening, questionnaire, or discussion as appropriate for fall prevention, nutrition, physical activity, Tobacco-use cessation, social engagement, weight loss and cognition.  Checklist reviewing preventive services available has been given to the patient.  Reviewed patient's diet, addressing concerns and/or questions.   She is at risk for psychosocial distress and has been provided with information to reduce risk.   Discussed possible causes of fatigue. Updated plan of care.  Patient reported difficulty with activities of daily living were addressed today.The patient was provided with written information regarding signs of hearing loss.   The patient's PHQ-9 score is consistent with mild depression. She was provided with information regarding  depression.       See Patient Instructions    Subjective   Dayana is a 89 year old, presenting for the following:  Wellness Visit        8/19/2024    11:05 AM   Additional Questions   Roomed by Adam DUBOSE CMA         Health Care Directive  Patient does not have a Health Care Directive or Living Will: Discussed advance care planning with patient; however, patient declined at this time.    HPI    Medications refills     No concerns        8/19/2024   General Health   How would you rate your overall physical health? (!) FAIR   Feel stress (tense, anxious, or unable to sleep) Only a little      (!) STRESS CONCERN      8/19/2024   Nutrition   Diet: Regular (no restrictions)            8/19/2024   Exercise   Days per week of moderate/strenous exercise 7 days   Average minutes spent exercising at this level Patient declined            8/19/2024   Social Factors   Frequency of gathering with friends or relatives More than three times a week   Worry food won't last until get money to buy more No   Food not last or not have enough money for food? No   Do you have housing? (Housing is defined as stable permanent housing and does not include staying ouside in a car, in a tent, in an abandoned building, in an overnight shelter, or couch-surfing.) Yes   Are you worried about losing your housing? No   Lack of transportation? No   Unable to get utilities (heat,electricity)? No            8/19/2024   Fall Risk   Fallen 2 or more times in the past year? Yes    Yes   Trouble with walking or balance? Yes    Yes   Gait Speed Test (Document in seconds) 6.35   Gait Speed Test Interpretation Greater than 5.01 seconds - ABNORMAL       Multiple values from one day are sorted in reverse-chronological order          8/19/2024   Activities of Daily Living- Home Safety   Needs help with the following daily activites Transportation    Shopping    Preparing meals    Housework    Bathing    Laundry    Medication administration    Dressing   Safety  concerns in the home None of the above       Multiple values from one day are sorted in reverse-chronological order         2024   Dental   Dentist two times every year? Yes            2024   Hearing Screening   Hearing concerns? (!) I FEEL THAT PEOPLE ARE MUMBLING OR NOT SPEAKING CLEARLY.            2024   Driving Risk Screening   Patient/family members have concerns about driving No            2024   General Alertness/Fatigue Screening   Have you been more tired than usual lately? (!) YES            2024   Urinary Incontinence Screening   Bothered by leaking urine in past 6 months No            2024   TB Screening   Were you born outside of the [place]? No          Today's PHQ-9 Score:       2024    11:20 AM   PHQ-9 SCORE   PHQ-9 Total Score MyChart 9 (Mild depression)   PHQ-9 Total Score 9         2024   Substance Use   Alcohol more than 3/day or more than 7/wk No   Do you have a current opioid prescription? No   How severe/bad is pain from 1 to 10? 0/10 (No Pain)   Do you use any other substances recreationally? No        Social History     Tobacco Use    Smoking status: Former     Current packs/day: 0.00     Types: Cigarettes     Quit date: 1990     Years since quittin.6     Passive exposure: Past    Smokeless tobacco: Never   Vaping Use    Vaping status: Never Used   Substance Use Topics    Alcohol use: Yes     Comment: glass of wine with dinner-sometimes    Drug use: No          Mammogram Screening - After age 74- determine frequency with patient based on health status, life expectancy and patient goals          Reviewed and updated as needed this visit by Provider                    Past Medical History:   Diagnosis Date    Aftercare following surgery of the musculoskeletal system 2017    Closed fracture of multiple ribs of left side with routine healing 2016    Closed fracture of ramus of left pubis (H) 2017    Closed fracture of right femur (H)  2017    Essential hypertension, benign     Hearing loss     wears bilateral aids    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; 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    History of endocarditis     Osteoporosis, unspecified     Personal history of colonic polyps     last colonoscopy      Past Surgical History:   Procedure Laterality Date    FEMUR SURGERY Right 2017    nail; right intertrochanteric fracture    LAPAROSCOPIC APPENDECTOMY N/A 2020    Procedure: LAPAROSCOPIC APPENDECTOMY;  Surgeon: Spenser Kent MD;  Location: [facility] OR    ORIF ELBOW FRACTURE Left 01/01/2015    x2     Lab work is in process  Labs reviewed in EPIC  BP Readings from Last 3 Encounters:   24 116/71   24 (!) 163/71   24 134/79    Wt Readings from Last 3 Encounters:   24 53.1 kg (117 lb)   24 50.6 kg (111 lb 8 oz)   24 50.6 kg (111 lb 8 oz)                  Patient Active Problem List   Diagnosis    Essential hypertension, benign    Osteoporosis    History of CVA (cerebrovascular accident)    History of endocarditis    Hearing loss    Adjustment disorder with anxious mood    History of skin cancer    Weakness    Vitamin D deficiency    Thrombocytopenia (H24)    Rotator cuff tear    Nuclear cataract, nonsenile    Mitral regurgitation    Macular degeneration    Hypokalemia    Falls    DNR (do not resuscitate)    Colon polyps    CHF (congestive heart failure) (H)    Anxiety    Chronic kidney disease, stage 3 (H)    Leg wound, left    Leg wound, right    Lower extremity edema    Shortness of breath    Elevated troponin    Multiple falls    Acute renal failure superimposed on chronic kidney disease, unspecified acute renal failure type, unspecified CKD stage  (H24)    Multiple fractures of ribs, right side, subsequent encounter for fracture with routine healin&9    Fracture of manubrium, subsequent encounter for fracture with routine healing    Repeated falls     Unspecified open wound, left lower leg, subsequent encounter    Physical deconditioning    NSTEMI (non-ST elevated myocardial infarction) (H)    Essential (primary) hypertension    Acute renal failure, unspecified acute renal failure type (H24)    Hypo-osmolality and hyponatremia    Unspecified open wound, right lower leg, subsequent encounter    Hypotension, unspecified hypotension type    Melanoma in situ (H)     Past Surgical History:   Procedure Laterality Date    FEMUR SURGERY Right 2017    nail; right intertrochanteric fracture    LAPAROSCOPIC APPENDECTOMY N/A 2020    Procedure: LAPAROSCOPIC APPENDECTOMY;  Surgeon: Spenser Kent MD;  Location: [facility] OR    ORIF ELBOW FRACTURE Left 01/01/2015    x2       Social History     Tobacco Use    Smoking status: Former     Current packs/day: 0.00     Types: Cigarettes     Quit date: 1990     Years since quittin.6     Passive exposure: Past    Smokeless tobacco: Never   Substance Use Topics    Alcohol use: Yes     Comment: glass of wine with dinner-sometimes     Family History   Problem Relation Age of Onset    Cerebrovascular Disease Father          Current Outpatient Medications   Medication Sig Dispense Refill    amLODIPine (NORVASC) 2.5 MG tablet Take 1 tablet (2.5 mg) by mouth daily 90 tablet 3    amoxicillin (AMOXIL) 500 MG tablet TAKE 4 TABLETS BY MOUTH ONE HOUR PRIOR TO DENTAL APPOINTMENT      aspirin (ASA) 325 MG tablet Take 325 mg by mouth daily      CALCIUM 600 + D 600-200 MG-UNIT OR TABS Take 1 tablet by mouth daily  3 MONTHS 1 YEAR    furosemide (LASIX) 40 MG tablet Take 1 tablet (40 mg) by mouth daily 90 tablet 3    ketoconazole (NIZORAL) 2 % external shampoo daily as needed for itching or irritation      latanoprost (XALATAN) 0.005 % ophthalmic solution Place 1 drop into both eyes at bedtime      lisinopril (ZESTRIL) 20 MG tablet Take 1 tablet (20 mg) by mouth 2 times daily 180 tablet 3    metoprolol tartrate (LOPRESSOR) 100 MG tablet  Take 1 tablet (100 mg) by mouth 2 times daily 180 tablet 3    multivitamin (OCUVITE) TABS tablet Take 1 tablet by mouth daily      polyethylene glycol 400 (BLINK TEARS) 0.25 % SOLN ophthalmic solution Place 1 drop into both eyes 2 times daily as needed for dry eyes      potassium chloride patsy ER (KLOR-CON M20) 20 MEQ CR tablet Take 1 tablet (20 mEq) by mouth daily 90 tablet 3    timolol maleate (TIMOPTIC) 0.5 % ophthalmic solution Place 1 drop into both eyes every morning      acetaminophen (TYLENOL) 325 MG tablet Take 325-650 mg by mouth every 6 hours as needed for mild pain (Patient not taking: Reported on 8/19/2024)      calcium carbonate (TUMS) 500 MG chewable tablet Take 1 tablet (500 mg) by mouth 2 times daily (Patient not taking: Reported on 8/19/2024)       Allergies   Allergen Reactions    Meperidine Nausea and Vomiting    Morphine Nausea and Vomiting    Hydromorphone Nausea and Vomiting     Recent Labs   Lab Test 05/06/24  0935 04/25/24  0503 04/10/24  0808 04/09/24  1919 08/02/23  1521 07/12/22  1629 07/12/22  1629 09/15/21  0939 06/10/21  1547 07/14/20  1413 05/11/20  0530 05/09/20  0909   A1C  --   --   --   --   --   --   --   --   --   --   --  5.4   ALT  --   --   --  23 12 --  19 -- 24  --   --   --    CR 0.84 0.74   < > 1.19* 0.95  --  0.87   < > 1.13* 0.98   < > 1.12*   GFRESTIMATED 62 72   < > 41* 54*  --  60*   < > 41* 48*   < > 42*   GFRESTBLACK  --   --   --   --   --   --   --   --  47* 56*   < > 48*   POTASSIUM 3.7 4.2   < > 4.9 4.4   < > 4.5   < > 5.0 4.5   < > 4.5   TSH  --   --   --   --  0.93  --   --   --  0.60  --   --   --     < > = values in this interval not displayed.      Current providers sharing in care for this patient include:  Patient Care Team:  Elisha Gaona MD as PCP - General (Family Medicine)  Elisha Gaona MD as Assigned PCP  Suinl Stephenson MD as MD (Cardiovascular Disease)    The following health maintenance items are reviewed in  Epic and correct as of today:  Health Maintenance   Topic Date Due    RSV VACCINE (Pregnancy & 60+) (1 - 1-dose 60+ series) Never done    COVID-19 Vaccine (7 - 2023-24 season) 03/28/2024    MICROALBUMIN  08/02/2024    ANNUAL REVIEW OF HM ORDERS  08/02/2024    MEDICARE ANNUAL WELLNESS VISIT  08/02/2024    DTAP/TDAP/TD IMMUNIZATION (1 - Tdap) 12/31/2024 (Originally 1/1/2015)    INFLUENZA VACCINE (1) 09/01/2024    BMP  11/06/2024    ALT  04/09/2025    CBC  04/12/2025    HEMOGLOBIN  04/12/2025    HF ACTION PLAN  07/13/2025    FALL RISK ASSESSMENT  08/19/2025    ADVANCE CARE PLANNING  08/19/2029    TSH W/FREE T4 REFLEX  Completed    PHQ-2 (once per calendar year)  Completed    Pneumococcal Vaccine: 65+ Years  Completed    URINALYSIS  Completed    ZOSTER IMMUNIZATION  Completed    IPV IMMUNIZATION  Aged Out    HPV IMMUNIZATION  Aged Out    MENINGITIS IMMUNIZATION  Aged Out    RSV MONOCLONAL ANTIBODY  Aged Out    DEXA  Discontinued    LIPID  Discontinued    MAMMO SCREENING  Discontinued    COLORECTAL CANCER SCREENING  Discontinued          Objective    Exam  /71 (BP Location: Right arm, Patient Position: Sitting, Cuff Size: Adult Regular)   Pulse 65   Temp 98  F (36.7  C) (Temporal)   Resp 18   Ht 1.524 m (5')   Wt 53.1 kg (117 lb)   SpO2 97%   BMI 22.85 kg/m     Estimated body mass index is 22.85 kg/m  as calculated from the following:    Height as of this encounter: 1.524 m (5').    Weight as of this encounter: 53.1 kg (117 lb).    Physical Exam  GENERAL: alert and no distress  EYES: Eyes grossly normal to inspection, PERRL and conjunctivae and sclerae normal  HENT: normal cephalic/atraumatic and ear canals and TM's normal  RESP: lungs clear to auscultation - no rales, rhonchi or wheezes  CV: regular rates and rhythm  PSYCH: affect normal/bright         8/19/2024   Mini Cog   Clock Draw Score 2 Normal   3 Item Recall 1 object recalled   Mini Cog Total Score 3                8/19/2024   Vision Screen   Reason  Vision Screen Not Completed Other          Signed Electronically by: Elisha Mcdaniel MD

## 2025-02-07 ENCOUNTER — MEDICAL CORRESPONDENCE (OUTPATIENT)
Dept: HEALTH INFORMATION MANAGEMENT | Facility: CLINIC | Age: OVER 89
End: 2025-02-07
Payer: MEDICARE

## 2025-04-14 DIAGNOSIS — I10 ESSENTIAL HYPERTENSION, BENIGN: ICD-10-CM

## 2025-04-14 RX ORDER — AMLODIPINE BESYLATE 2.5 MG/1
2.5 TABLET ORAL DAILY
Qty: 90 TABLET | Refills: 0 | Status: SHIPPED | OUTPATIENT
Start: 2025-04-14

## 2025-04-29 ENCOUNTER — MEDICAL CORRESPONDENCE (OUTPATIENT)
Dept: HEALTH INFORMATION MANAGEMENT | Facility: CLINIC | Age: OVER 89
End: 2025-04-29
Payer: MEDICARE

## 2025-04-30 ENCOUNTER — TELEPHONE (OUTPATIENT)
Dept: FAMILY MEDICINE | Facility: CLINIC | Age: OVER 89
End: 2025-04-30
Payer: MEDICARE

## 2025-04-30 NOTE — TELEPHONE ENCOUNTER
RN chart review   No POLST on file at [facility]     RN is able to see in care everywhere [facility] had completed a POLST - we do not have this document and would not be able to release as this is out of the systemm   Advance Directives - Documents on File  Documents on File  Type Date Recorded Patient Representative Explanation   POLST 12/21/2016 9:49 AM   [facility] - 12/20/2016

## 2025-04-30 NOTE — TELEPHONE ENCOUNTER
RN spoke to Nessa RN at [facility] in [place]   Explained that we do not have a POLST on file   We need to schedule a visit to complete   Nessa advised writer to contact daughter Marion to assist with scheduling     RN spoke to daughter Marion (C2C)   Discussed visit is needed to complete POLST - agreeable and RN scheduled as below   Appointments in Next Year      May 20, 2025 4:30 PM  (Arrive by 4:15 PM)  Provider Visit with April Taylor Mcdaniel MD  [facility] ([facility] [place] ) [phone]     Marion is wondering why [facility] is just asking now for the POLST when patient has been there for 8 years   RN advised to follow up with [facility] if she feels this is a mistake and that they have it on file - if they do she can call back to cancel appt     [facility] does not have any ACP docs in epic     Shawna Holt, Registered Nurse  [facility]

## 2025-04-30 NOTE — TELEPHONE ENCOUNTER
See below staff message from Dr. Jim Mcdaniel:        Lorene VELA RN  [facility]

## 2025-05-01 ENCOUNTER — TELEPHONE (OUTPATIENT)
Dept: FAMILY MEDICINE | Facility: CLINIC | Age: OVER 89
End: 2025-05-01
Payer: MEDICARE

## 2025-05-01 NOTE — TELEPHONE ENCOUNTER
Received call from ALAINA Faria with Kimberly Andres at [facility] requesting order from provider stating it is ok for patient to self-administer her oral medications and eye drops while her daughter is out of the country. Giana did not have a specific duration that the daughter will be gone. Patient's daughter is a nurse and comes to set up medications and administers them daily. [facility] staff will be administering topical medications for patient only.       RN pended letter, routing to Dr. Jim Mcdaniel to review/edit/approve. If approved, please print and sign and give to MA/TCs to fax.       Letter signed by provider can be faxed to: [phone]    Lissett PEÑA RN  [facility]

## 2025-05-01 NOTE — LETTER
May 1, 2025      Dayana Samano  [address] [place] [id number]        To Whom It May Concern,     I, Dr. Jim Mcdaniel, Dayana's primary care provider (PCP), will allow Dayana to temporarily self-administer her oral medications as well as eye drops while her daughter, Marion, is out of town.       Sincerely,          Elisha Mcdaniel MD

## 2025-06-30 ENCOUNTER — NURSE TRIAGE (OUTPATIENT)
Dept: FAMILY MEDICINE | Facility: CLINIC | Age: OVER 89
End: 2025-06-30
Payer: MEDICARE

## 2025-06-30 NOTE — TELEPHONE ENCOUNTER
Nurse Triage SBAR    Is this a 2nd Level Triage? NO    Situation: Patient's daughter calls to report cough. CTC on file    Background: 89 year old female, new onset of moist sounding cough yesterday.     Assessment: Per daughter, first noticed cough yesterday, seems a little more frequent today. Loose sounding but not bringing anything up. No other symptoms.    Denies fever, chest pain, difficulty breathing, wheezing.    Protocol Recommended Disposition:   Home Care    Recommendation: Reviewed home care advise with patient and daughter. Advised to call back with any new or worsening symptoms, or if symptoms persist into next week. Patient and daughter verbalized understanding and agreement in plan.     Rose GONZALEZ RN  6/30/2025 at 3:42 PM  [employer]    Reason for Disposition   Cough with no complications    Additional Information   Negative: Bluish (or gray) lips or face   Negative: SEVERE difficulty breathing (e.g., struggling for each breath, speaks in single words)   Negative: Rapid onset of cough and has hives   Negative: Coughing started suddenly after medicine, an allergic food or bee sting   Negative: Difficulty breathing after exposure to flames, smoke, or fumes   Negative: Sounds like a life-threatening emergency to the triager   Negative: Previous asthma attacks and this feels like asthma attack   Negative: Dry cough (non-productive; no sputum or minimal clear sputum) and within 14 days of COVID-19 Exposure   Negative: MODERATE difficulty breathing (e.g., speaks in phrases, SOB even at rest, pulse 100-120) and still present when not coughing   Negative: Chest pain present when not coughing   Negative: Passed out (e.g., fainted, lost consciousness, blacked out and was not responding)   Negative: Patient sounds very sick or weak to the triager   Negative: MILD difficulty breathing (e.g., minimal/no SOB at rest, SOB with walking, pulse <100) and still present when not coughing   Negative: Coughed up >  "1 tablespoon (15 ml) blood (Exception: Blood-tinged sputum.)   Negative: Fever > 103 F (39.4 C)   Negative: Fever > 101 F (38.3 C) and over 60 years of age   Negative: Fever > 100 F (37.8 C) and has diabetes mellitus or a weak immune system (e.g., HIV positive, cancer chemotherapy, organ transplant, splenectomy, chronic steroids)   Negative: Fever > 100 F (37.8 C) and bedridden (e.g., CVA, chronic illness, recovering from surgery)   Negative: Increasing ankle swelling   Negative: Wheezing is present   Negative: SEVERE coughing spells (e.g., whooping sound after coughing, vomiting after coughing)   Negative: Coughing up lyla-colored (reddish-brown) or blood-tinged sputum   Negative: Fever present > 3 days (72 hours)   Negative: Fever returns after gone for over 24 hours and symptoms worse or not improved   Negative: Using nasal washes and pain medicine > 24 hours and sinus pain persists   Negative: Known COPD or other severe lung disease (i.e., bronchiectasis, cystic fibrosis, lung surgery) and symptoms getting worse (i.e., increased sputum purulence or amount, increased breathing difficulty)   Negative: Continuous (nonstop) coughing interferes with work or school and no improvement using cough treatment per Care Advice   Negative: Patient wants to be seen   Negative: Cough has been present for > 3 weeks   Negative: Allergy symptoms are also present (e.g., itchy eyes, clear nasal discharge, postnasal drip)   Negative: Nasal discharge present > 10 days   Negative: Exposure to TB (Tuberculosis)   Negative: Taking an ACE Inhibitor medicine (e.g., benazepril/LOTENSIN, captopril/CAPOTEN, enalapril/VASOTEC, lisinopril/ZESTRIL)    Answer Assessment - Initial Assessment Questions  1. ONSET: \"When did the cough begin?\"       Daughter noticed yesterday  2. SEVERITY: \"How bad is the cough today?\"       More frequent today  3. SPUTUM: \"Describe the color of your sputum\" (e.g., none, dry cough; clear, white, yellow, green)      " "no  4. HEMOPTYSIS: \"Are you coughing up any blood?\" If Yes, ask: \"How much?\" (e.g., flecks, streaks, tablespoons, etc.)      no  5. DIFFICULTY BREATHING: \"Are you having difficulty breathing?\" If Yes, ask: \"How bad is it?\" (e.g., mild, moderate, severe)       No difficulty breathing  6. FEVER: \"Do you have a fever?\" If Yes, ask: \"What is your temperature, how was it measured, and when did it start?\"      No fever  7. CARDIAC HISTORY: \"Do you have any history of heart disease?\" (e.g., heart attack, congestive heart failure)       Hx of congestive heart failure, no current symptoms  8. LUNG HISTORY: \"Do you have any history of lung disease?\"  (e.g., pulmonary embolus, asthma, emphysema)      No  9. PE RISK FACTORS: \"Do you have a history of blood clots?\" (or: recent major surgery, recent prolonged travel, bedridden)      no  10. OTHER SYMPTOMS: \"Do you have any other symptoms?\" (e.g., runny nose, wheezing, chest pain)        No sneezing. Chronic \"drippy nose\", no audible wheezing, no chest pain  11. PREGNANCY: \"Is there any chance you are pregnant?\" \"When was your last menstrual period?\"        N/a  12. TRAVEL: \"Have you traveled out of the country in the last month?\" (e.g., travel history, exposures)        no    Protocols used: Cough-A-OH    "

## 2025-07-02 ENCOUNTER — MEDICAL CORRESPONDENCE (OUTPATIENT)
Dept: HEALTH INFORMATION MANAGEMENT | Facility: CLINIC | Age: OVER 89
End: 2025-07-02
Payer: MEDICARE

## 2025-07-10 ENCOUNTER — NURSE TRIAGE (OUTPATIENT)
Dept: FAMILY MEDICINE | Facility: CLINIC | Age: OVER 89
End: 2025-07-10
Payer: MEDICARE

## 2025-07-10 NOTE — TELEPHONE ENCOUNTER
Nurse Triage SBAR    Is this a 2nd Level Triage? NO    Situation: Received call from patient's daughter, Kylee TRIPLETT on file. She is calling back to follow-up on cough patient has been experiencing per nurse triage encounter from 6/30.    Background: Patient experiencing persistent cough. It is loose sounding but not bringing anything up. No other symptoms, and has stayed about the same as it did when discussed on 6/30.     She is not coughing up blood, no SOB, or any additional symptoms.     Assessment: Advised of care advice per protocol.     Protocol Recommended Disposition:   Home Care    Recommendation: Recommended if any further worsening in symptoms, such as developing SOB or fever, that patient should be seen sooner. Patient's daughter verbalized understanding and has no further questions at this time.     DESTINY Pedro RN  [facility]  Reason for Disposition   Cough with no complications    Additional Information   Negative: Bluish (or gray) lips or face   Negative: SEVERE difficulty breathing (e.g., struggling for each breath, speaks in single words)   Negative: Rapid onset of cough and has hives   Negative: Coughing started suddenly after medicine, an allergic food or bee sting   Negative: Difficulty breathing after exposure to flames, smoke, or fumes   Negative: Sounds like a life-threatening emergency to the triager   Negative: Previous asthma attacks and this feels like asthma attack   Negative: Dry cough (non-productive; no sputum or minimal clear sputum) and within 14 days of COVID-19 Exposure   Negative: MODERATE difficulty breathing (e.g., speaks in phrases, SOB even at rest, pulse 100-120) and still present when not coughing   Negative: Chest pain present when not coughing   Negative: Passed out (e.g., fainted, lost consciousness, blacked out and was not responding)   Negative: Patient sounds very sick or weak to the triager   Negative: MILD difficulty breathing (e.g., minimal/no  SOB at rest, SOB with walking, pulse <100) and still present when not coughing   Negative: Coughed up > 1 tablespoon (15 ml) blood (Exception: Blood-tinged sputum.)   Negative: Fever > 103 F (39.4 C)   Negative: Fever > 101 F (38.3 C) and over 60 years of age   Negative: Fever > 100 F (37.8 C) and has diabetes mellitus or a weak immune system (e.g., HIV positive, cancer chemotherapy, organ transplant, splenectomy, chronic steroids)   Negative: Fever > 100 F (37.8 C) and bedridden (e.g., CVA, chronic illness, recovering from surgery)   Negative: Increasing ankle swelling   Negative: Wheezing is present   Negative: SEVERE coughing spells (e.g., whooping sound after coughing, vomiting after coughing)   Negative: Coughing up lyla-colored (reddish-brown) or blood-tinged sputum   Negative: Fever present > 3 days (72 hours)   Negative: Fever returns after gone for over 24 hours and symptoms worse or not improved   Negative: Using nasal washes and pain medicine > 24 hours and sinus pain persists   Negative: Known COPD or other severe lung disease (i.e., bronchiectasis, cystic fibrosis, lung surgery) and symptoms getting worse (i.e., increased sputum purulence or amount, increased breathing difficulty)   Negative: Continuous (nonstop) coughing interferes with work or school and no improvement using cough treatment per Care Advice   Negative: Patient wants to be seen   Negative: Cough has been present for > 3 weeks   Negative: Allergy symptoms are also present (e.g., itchy eyes, clear nasal discharge, postnasal drip)   Negative: Nasal discharge present > 10 days   Negative: Exposure to TB (Tuberculosis)   Negative: Taking an ACE Inhibitor medicine (e.g., benazepril/LOTENSIN, captopril/CAPOTEN, enalapril/VASOTEC, lisinopril/ZESTRIL)    Protocols used: Cough-A-OH

## 2025-07-14 DIAGNOSIS — I10 ESSENTIAL HYPERTENSION, BENIGN: ICD-10-CM

## 2025-07-14 RX ORDER — AMLODIPINE BESYLATE 2.5 MG/1
2.5 TABLET ORAL DAILY
Qty: 90 TABLET | Refills: 0 | Status: SHIPPED | OUTPATIENT
Start: 2025-07-14

## 2025-09-02 DIAGNOSIS — I10 ESSENTIAL HYPERTENSION, BENIGN: ICD-10-CM

## 2025-09-03 RX ORDER — POTASSIUM CHLORIDE 1500 MG/1
20 TABLET, EXTENDED RELEASE ORAL DAILY
Qty: 90 TABLET | Refills: 3 | Status: SHIPPED | OUTPATIENT
Start: 2025-09-03

## 2025-09-04 PROBLEM — H90.3 BILATERAL SENSORINEURAL HEARING LOSS: Status: ACTIVE | Noted: 2025-09-04

## (undated) DEVICE — ESU GROUND PAD ADULT W/CORD E7507

## (undated) DEVICE — ENDO TROCAR FIRST ENTRY KII FIOS Z-THRD 05X100MM CTF03

## (undated) DEVICE — GLOVE PROTEXIS POWDER FREE SMT 7.5  2D72PT75X

## (undated) DEVICE — Device

## (undated) DEVICE — BAG CLEAR TRASH 1.3M 39X33" P4040C

## (undated) DEVICE — ESU DISSECTOR SONICISION CVD JAW ULATRSONIC SCDA39

## (undated) DEVICE — NDL 22GA 1.5"

## (undated) DEVICE — SUCTION IRR STRYKERFLOW II W/TIP 250-070-520

## (undated) DEVICE — DRAIN JACKSON PRATT 15FR ROUND SIL LF JP-2229

## (undated) DEVICE — MANIFOLD NEPTUNE 4 PORT 700-20

## (undated) DEVICE — SU VICRYL 0 CT-2 CR 8X18" J727D

## (undated) DEVICE — SOL NACL 0.9% IRRIG 3000ML BAG 2B7477

## (undated) DEVICE — ESU CORD MONOPOLAR 10'  E0510

## (undated) DEVICE — ENDO TROCAR OPTICAL ACCESS KII Z-THRD 12X100MM C0R85

## (undated) DEVICE — ESU PENCIL W/HOLSTER E2350H

## (undated) DEVICE — SU ETHILON 2-0 PS 18" 585H

## (undated) DEVICE — SU VICRYL 4-0 P-3 18" UND J494G

## (undated) DEVICE — ESU ELEC BLADE 2.75" COATED/INSULATED E1455

## (undated) DEVICE — DRSG GAUZE 4X4" 3033

## (undated) DEVICE — ENDO TROCAR SLEEVE KII Z-THREADED 05X100MM CTS02

## (undated) DEVICE — LINEN TOWEL PACK X10 5473

## (undated) DEVICE — SUCTION LAPAROSCOPIC SMOKE EVAC SYSTEM SEL7000A

## (undated) DEVICE — GLOVE PROTEXIS POWDER FREE 8.0 ORTHOPEDIC 2D73ET80

## (undated) DEVICE — BLADE CLIPPER 3M 9670

## (undated) DEVICE — STPL POWERED ECHELON 45MM PSEE45A

## (undated) DEVICE — ENDO POUCH UNIV RETRIEVAL SYSTEM INZII 10MM CD001

## (undated) DEVICE — DRAIN JACKSON PRATT RESERVOIR 100ML SU130-1305

## (undated) DEVICE — LINEN HALF SHEET 5512

## (undated) DEVICE — LINEN FULL SHEET 5511

## (undated) DEVICE — CATH TRAY URETHRAL 14FR LF 772417

## (undated) DEVICE — LINEN POUCH DBL 5427

## (undated) RX ORDER — BUPIVACAINE HYDROCHLORIDE 5 MG/ML
INJECTION, SOLUTION EPIDURAL; INTRACAUDAL
Status: DISPENSED
Start: 2020-05-08

## (undated) RX ORDER — HYDRALAZINE HYDROCHLORIDE 20 MG/ML
INJECTION INTRAMUSCULAR; INTRAVENOUS
Status: DISPENSED
Start: 2020-05-08

## (undated) RX ORDER — FENTANYL CITRATE 50 UG/ML
INJECTION, SOLUTION INTRAMUSCULAR; INTRAVENOUS
Status: DISPENSED
Start: 2020-05-08

## (undated) RX ORDER — ONDANSETRON 2 MG/ML
INJECTION INTRAMUSCULAR; INTRAVENOUS
Status: DISPENSED
Start: 2020-05-08